# Patient Record
Sex: MALE | Race: WHITE | NOT HISPANIC OR LATINO
[De-identification: names, ages, dates, MRNs, and addresses within clinical notes are randomized per-mention and may not be internally consistent; named-entity substitution may affect disease eponyms.]

---

## 2022-10-06 PROBLEM — Z00.00 ENCOUNTER FOR PREVENTIVE HEALTH EXAMINATION: Status: ACTIVE | Noted: 2022-10-06

## 2022-10-07 ENCOUNTER — TRANSCRIPTION ENCOUNTER (OUTPATIENT)
Age: 72
End: 2022-10-07

## 2022-10-07 ENCOUNTER — INPATIENT (INPATIENT)
Facility: HOSPITAL | Age: 72
LOS: 11 days | Discharge: HOME | DRG: 216 | End: 2022-10-19
Attending: INTERNAL MEDICINE | Admitting: INTERNAL MEDICINE
Payer: MEDICARE

## 2022-10-07 ENCOUNTER — APPOINTMENT (OUTPATIENT)
Dept: HEART AND VASCULAR | Facility: CLINIC | Age: 72
End: 2022-10-07

## 2022-10-07 VITALS — HEIGHT: 70 IN | WEIGHT: 157.41 LBS

## 2022-10-07 DIAGNOSIS — Z98.890 OTHER SPECIFIED POSTPROCEDURAL STATES: Chronic | ICD-10-CM

## 2022-10-07 RX ORDER — ASPIRIN/CALCIUM CARB/MAGNESIUM 324 MG
81 TABLET ORAL DAILY
Refills: 0 | Status: DISCONTINUED | OUTPATIENT
Start: 2022-10-07 | End: 2022-10-11

## 2022-10-07 RX ORDER — SODIUM CHLORIDE 9 MG/ML
3 INJECTION INTRAMUSCULAR; INTRAVENOUS; SUBCUTANEOUS EVERY 8 HOURS
Refills: 0 | Status: DISCONTINUED | OUTPATIENT
Start: 2022-10-07 | End: 2022-10-19

## 2022-10-07 RX ORDER — INFLUENZA VIRUS VACCINE 15; 15; 15; 15 UG/.5ML; UG/.5ML; UG/.5ML; UG/.5ML
0.7 SUSPENSION INTRAMUSCULAR ONCE
Refills: 0 | Status: COMPLETED | OUTPATIENT
Start: 2022-10-07 | End: 2022-10-07

## 2022-10-07 RX ORDER — ENOXAPARIN SODIUM 100 MG/ML
70 INJECTION SUBCUTANEOUS EVERY 12 HOURS
Refills: 0 | Status: DISCONTINUED | OUTPATIENT
Start: 2022-10-07 | End: 2022-10-09

## 2022-10-07 RX ORDER — METOPROLOL TARTRATE 50 MG
25 TABLET ORAL DAILY
Refills: 0 | Status: DISCONTINUED | OUTPATIENT
Start: 2022-10-07 | End: 2022-10-08

## 2022-10-07 RX ORDER — ISOSORBIDE MONONITRATE 60 MG/1
30 TABLET, EXTENDED RELEASE ORAL DAILY
Refills: 0 | Status: DISCONTINUED | OUTPATIENT
Start: 2022-10-07 | End: 2022-10-09

## 2022-10-07 RX ORDER — ATORVASTATIN CALCIUM 80 MG/1
40 TABLET, FILM COATED ORAL AT BEDTIME
Refills: 0 | Status: DISCONTINUED | OUTPATIENT
Start: 2022-10-07 | End: 2022-10-19

## 2022-10-07 RX ADMIN — ATORVASTATIN CALCIUM 40 MILLIGRAM(S): 80 TABLET, FILM COATED ORAL at 22:01

## 2022-10-07 RX ADMIN — ENOXAPARIN SODIUM 70 MILLIGRAM(S): 100 INJECTION SUBCUTANEOUS at 22:01

## 2022-10-07 RX ADMIN — SODIUM CHLORIDE 3 MILLILITER(S): 9 INJECTION INTRAMUSCULAR; INTRAVENOUS; SUBCUTANEOUS at 21:15

## 2022-10-07 NOTE — H&P ADULT - NSHPREVIEWOFSYSTEMS_GEN_ALL_CORE
Review of Systems  CONSTITUTIONAL:  Denies Fevers / chills, sweats, fatigue, weight loss, weight gain                                      NEURO:  Denies parethesias, seizures, syncope, confusion                                                                                EYES:  Denies Blurry vision, discharge, pain, loss of vision                                                                                    ENMT:  Denies Difficulty hearing, vertigo, dysphagia, epistaxis, recent dental work                                       CV:  Denies Chest pain, palpitations, RUIZ, orthopnea                                                                                          RESPIRATORY:  Denies Wheezing, SOB, cough / sputum, hemoptysis                                                                GI:  Denies Nausea, vomiting, diarrhea, constipation, melena, difficulty swallowing                                               : Denies Hematuria, dysuria, urgency, incontinence                                                                                         MUSCULOSKELETAL:  Denies arthritis, joint swelling, muscle weakness                                                             SKIN/BREAST:  Denies rash, itching, hair loss, masses                                                                                            PSYCH:  Denies depression, anxiety, suicidal ideation                                                                                               HEME/LYMPH:  Denies bruises easily, enlarged lymph nodes, tender lymph nodes                                        ENDOCRINE:  Denies cold intolerance, heat intolerance, polydipsia

## 2022-10-07 NOTE — H&P ADULT - ASSESSMENT
Patient is a 72-year-old male nicotine dependence who has not followed with physicians regularly presented to the emergency department with complaint of substernal chest pain radiating to bilateral  arms. Patient reports he had initial episode on 10/3 upon awakening. It lasted approximately 15 minutes patient took aspirin at home and resolved. Patient had another episode of chest pain   radiating to bilateral arms this morning lasting approximately 15 minutes again took a dose of aspirin. Reoccurred yesterday afternoon and presented to the emergency department for further   evaluation. In the ER patient was found to have inverted T waves in the lateral leads and markedly elevated troponin. Patient was chest pain-free upon arrival in the ER. Emergency department spoke   with interventional cardiology who recommend loading the patient with Plavix starting on heparin drip for planned cardiac catheterization. Patient denies any associated neck or jaw pain   with his chest pain symptoms. Patient denies any cardiac evaluation in the past. He denies any family history of heart disease. Cardiac cath showed (90% mLAD, 95% pLCX, 75% mRCA).      ==== Neurovascular ====  -No delirium, pain well managed on current regimen  -C/w PRNs for Pain control  -Monitor neuro status    ==== Respiratory ====  -Saturates well on RA     -AM CXR stable, repeat in AM  -Encourage IS 10x/hour while awake, Cough and deep breathing exercises  -Monitor respiratory status via SpO2    ==== Cardiovascular ====  Monitor on Tele  Continue aspirin 81mg QD  Continue Statin  Hold Lopressor due to bradycardia  Obtain CBC, CMP, Coags, Lipid Panel, TSH, Hemoglobin A1c, Pro-BNP, Cardiac Enzymes, Type & Screen x 2,  Room air ABG, UA, Carotid US, TTE, CXR Pa/Lat, EKG, Bedside PFTS, vein mapping, radial mapping   Lovenox for NSTEMI      ==== GI ====   -Tolerating PO  -Prophylaxis: Protonix  -C/w bowel regimen    ==== /Renal ====  -BUN/Cr: pending  -Trend Cr on AM labs  -Replete electrolytes as needed  -BPH: Will place on flomax while in house     ==== ID ====   Afebrile, asymptomatic  -WBC: pending  -Continue to monitor for SIRS/Sepsis syndrome while inpatient    ==== Endocrine ====   -A1C:pending , no h/o DM  -TSH: pending, no h/o thyroid disease     ==== Hematologic ====   -H/H: Pending  -CBC, chem in AM  -DVT ppx: Lovenox and SCDs    ==== Disposition Planning ====  Telemetry, OR next week

## 2022-10-07 NOTE — H&P ADULT - NSHPPHYSICALEXAM_GEN_ALL_CORE
ICU Vital Signs Last 24 Hrs  T(C): --  T(F): --  HR: 62  BP: --  BP(mean): --  ABP: --  ABP(mean): --  RR: --  SpO2: --    GEN: NAD, looks comfortable  Psych: Mood appropriate  Neuro: A&Ox3.  No focal deficits.  Moving all extremities.   HEENT: No obvious abnormalities  CV: S1S2, regular, no murmurs appreciated.  No carotid bruits.  No JVD  Lungs: Clear B/L.  No wheezing, rales or rhonchi  ABD: Soft, non-tender, non-distended.  +Bowel sounds  EXT: Warm and well perfused.  No peripheral edema noted  Musculoskeletal: Moving all extremities with normal ROM, no joint swelling  PV: Pedal pulses palpable

## 2022-10-08 ENCOUNTER — TRANSCRIPTION ENCOUNTER (OUTPATIENT)
Age: 72
End: 2022-10-08

## 2022-10-08 LAB
A1C WITH ESTIMATED AVERAGE GLUCOSE RESULT: 5.4 % — SIGNIFICANT CHANGE UP (ref 4–5.6)
ALBUMIN SERPL ELPH-MCNC: 3.7 G/DL — SIGNIFICANT CHANGE UP (ref 3.3–5)
ALP SERPL-CCNC: 76 U/L — SIGNIFICANT CHANGE UP (ref 40–120)
ALT FLD-CCNC: 12 U/L — SIGNIFICANT CHANGE UP (ref 10–45)
ANION GAP SERPL CALC-SCNC: 11 MMOL/L — SIGNIFICANT CHANGE UP (ref 5–17)
APPEARANCE UR: CLEAR — SIGNIFICANT CHANGE UP
APTT BLD: 36 SEC — HIGH (ref 27.5–35.5)
AST SERPL-CCNC: 18 U/L — SIGNIFICANT CHANGE UP (ref 10–40)
BACTERIA # UR AUTO: PRESENT /HPF
BASOPHILS # BLD AUTO: 0.05 K/UL — SIGNIFICANT CHANGE UP (ref 0–0.2)
BASOPHILS NFR BLD AUTO: 0.7 % — SIGNIFICANT CHANGE UP (ref 0–2)
BILIRUB SERPL-MCNC: 1.1 MG/DL — SIGNIFICANT CHANGE UP (ref 0.2–1.2)
BILIRUB UR-MCNC: NEGATIVE — SIGNIFICANT CHANGE UP
BUN SERPL-MCNC: 12 MG/DL — SIGNIFICANT CHANGE UP (ref 7–23)
CALCIUM SERPL-MCNC: 9.2 MG/DL — SIGNIFICANT CHANGE UP (ref 8.4–10.5)
CHLORIDE SERPL-SCNC: 105 MMOL/L — SIGNIFICANT CHANGE UP (ref 96–108)
CHOLEST SERPL-MCNC: 181 MG/DL — SIGNIFICANT CHANGE UP
CO2 SERPL-SCNC: 23 MMOL/L — SIGNIFICANT CHANGE UP (ref 22–31)
COLOR SPEC: YELLOW — SIGNIFICANT CHANGE UP
CREAT SERPL-MCNC: 1.05 MG/DL — SIGNIFICANT CHANGE UP (ref 0.5–1.3)
DIFF PNL FLD: ABNORMAL
EGFR: 76 ML/MIN/1.73M2 — SIGNIFICANT CHANGE UP
EOSINOPHIL # BLD AUTO: 0.37 K/UL — SIGNIFICANT CHANGE UP (ref 0–0.5)
EOSINOPHIL NFR BLD AUTO: 4.9 % — SIGNIFICANT CHANGE UP (ref 0–6)
EPI CELLS # UR: SIGNIFICANT CHANGE UP /HPF (ref 0–5)
ESTIMATED AVERAGE GLUCOSE: 108 MG/DL — SIGNIFICANT CHANGE UP (ref 68–114)
GLUCOSE SERPL-MCNC: 104 MG/DL — HIGH (ref 70–99)
GLUCOSE UR QL: NEGATIVE — SIGNIFICANT CHANGE UP
HCT VFR BLD CALC: 41 % — SIGNIFICANT CHANGE UP (ref 39–50)
HCV AB S/CO SERPL IA: 0.03 S/CO — SIGNIFICANT CHANGE UP
HCV AB SERPL-IMP: SIGNIFICANT CHANGE UP
HDLC SERPL-MCNC: 41 MG/DL — SIGNIFICANT CHANGE UP
HGB BLD-MCNC: 13.8 G/DL — SIGNIFICANT CHANGE UP (ref 13–17)
IMM GRANULOCYTES NFR BLD AUTO: 0.3 % — SIGNIFICANT CHANGE UP (ref 0–0.9)
INR BLD: 1.04 — SIGNIFICANT CHANGE UP (ref 0.88–1.16)
KETONES UR-MCNC: NEGATIVE — SIGNIFICANT CHANGE UP
LEUKOCYTE ESTERASE UR-ACNC: NEGATIVE — SIGNIFICANT CHANGE UP
LIPID PNL WITH DIRECT LDL SERPL: 113 MG/DL — HIGH
LYMPHOCYTES # BLD AUTO: 2.48 K/UL — SIGNIFICANT CHANGE UP (ref 1–3.3)
LYMPHOCYTES # BLD AUTO: 32.8 % — SIGNIFICANT CHANGE UP (ref 13–44)
MAGNESIUM SERPL-MCNC: 2 MG/DL — SIGNIFICANT CHANGE UP (ref 1.6–2.6)
MCHC RBC-ENTMCNC: 31.5 PG — SIGNIFICANT CHANGE UP (ref 27–34)
MCHC RBC-ENTMCNC: 33.7 GM/DL — SIGNIFICANT CHANGE UP (ref 32–36)
MCV RBC AUTO: 93.6 FL — SIGNIFICANT CHANGE UP (ref 80–100)
MONOCYTES # BLD AUTO: 0.57 K/UL — SIGNIFICANT CHANGE UP (ref 0–0.9)
MONOCYTES NFR BLD AUTO: 7.5 % — SIGNIFICANT CHANGE UP (ref 2–14)
NEUTROPHILS # BLD AUTO: 4.07 K/UL — SIGNIFICANT CHANGE UP (ref 1.8–7.4)
NEUTROPHILS NFR BLD AUTO: 53.8 % — SIGNIFICANT CHANGE UP (ref 43–77)
NITRITE UR-MCNC: NEGATIVE — SIGNIFICANT CHANGE UP
NON HDL CHOLESTEROL: 140 MG/DL — HIGH
NRBC # BLD: 0 /100 WBCS — SIGNIFICANT CHANGE UP (ref 0–0)
NT-PROBNP SERPL-SCNC: 1223 PG/ML — HIGH (ref 0–300)
PA ADP PRP-ACNC: 184 PRU — LOW (ref 194–418)
PH UR: 6 — SIGNIFICANT CHANGE UP (ref 5–8)
PHOSPHATE SERPL-MCNC: 3.5 MG/DL — SIGNIFICANT CHANGE UP (ref 2.5–4.5)
PLATELET # BLD AUTO: 188 K/UL — SIGNIFICANT CHANGE UP (ref 150–400)
POTASSIUM SERPL-MCNC: 3.7 MMOL/L — SIGNIFICANT CHANGE UP (ref 3.5–5.3)
POTASSIUM SERPL-SCNC: 3.7 MMOL/L — SIGNIFICANT CHANGE UP (ref 3.5–5.3)
PROT SERPL-MCNC: 6.4 G/DL — SIGNIFICANT CHANGE UP (ref 6–8.3)
PROT UR-MCNC: NEGATIVE MG/DL — SIGNIFICANT CHANGE UP
PROTHROM AB SERPL-ACNC: 12.4 SEC — SIGNIFICANT CHANGE UP (ref 10.5–13.4)
RBC # BLD: 4.38 M/UL — SIGNIFICANT CHANGE UP (ref 4.2–5.8)
RBC # FLD: 13.2 % — SIGNIFICANT CHANGE UP (ref 10.3–14.5)
RBC CASTS # UR COMP ASSIST: < 5 /HPF — SIGNIFICANT CHANGE UP
SODIUM SERPL-SCNC: 139 MMOL/L — SIGNIFICANT CHANGE UP (ref 135–145)
SP GR SPEC: 1.02 — SIGNIFICANT CHANGE UP (ref 1–1.03)
TRIGL SERPL-MCNC: 134 MG/DL — SIGNIFICANT CHANGE UP
TROPONIN T SERPL-MCNC: 0.32 NG/ML — CRITICAL HIGH (ref 0–0.01)
TROPONIN T SERPL-MCNC: 0.34 NG/ML — CRITICAL HIGH (ref 0–0.01)
TSH SERPL-MCNC: 2.29 UIU/ML — SIGNIFICANT CHANGE UP (ref 0.27–4.2)
UROBILINOGEN FLD QL: 0.2 E.U./DL — SIGNIFICANT CHANGE UP
WBC # BLD: 7.56 K/UL — SIGNIFICANT CHANGE UP (ref 3.8–10.5)
WBC # FLD AUTO: 7.56 K/UL — SIGNIFICANT CHANGE UP (ref 3.8–10.5)
WBC UR QL: < 5 /HPF — SIGNIFICANT CHANGE UP

## 2022-10-08 PROCEDURE — 93010 ELECTROCARDIOGRAM REPORT: CPT

## 2022-10-08 PROCEDURE — 71046 X-RAY EXAM CHEST 2 VIEWS: CPT | Mod: 26

## 2022-10-08 PROCEDURE — 93306 TTE W/DOPPLER COMPLETE: CPT | Mod: 26

## 2022-10-08 PROCEDURE — 99232 SBSQ HOSP IP/OBS MODERATE 35: CPT

## 2022-10-08 PROCEDURE — 93880 EXTRACRANIAL BILAT STUDY: CPT | Mod: 26

## 2022-10-08 RX ORDER — POTASSIUM CHLORIDE 20 MEQ
40 PACKET (EA) ORAL ONCE
Refills: 0 | Status: COMPLETED | OUTPATIENT
Start: 2022-10-08 | End: 2022-10-08

## 2022-10-08 RX ORDER — PANTOPRAZOLE SODIUM 20 MG/1
40 TABLET, DELAYED RELEASE ORAL
Refills: 0 | Status: DISCONTINUED | OUTPATIENT
Start: 2022-10-08 | End: 2022-10-10

## 2022-10-08 RX ORDER — ACETAMINOPHEN 500 MG
650 TABLET ORAL EVERY 6 HOURS
Refills: 0 | Status: DISCONTINUED | OUTPATIENT
Start: 2022-10-08 | End: 2022-10-19

## 2022-10-08 RX ORDER — SENNA PLUS 8.6 MG/1
2 TABLET ORAL AT BEDTIME
Refills: 0 | Status: DISCONTINUED | OUTPATIENT
Start: 2022-10-08 | End: 2022-10-19

## 2022-10-08 RX ADMIN — ENOXAPARIN SODIUM 70 MILLIGRAM(S): 100 INJECTION SUBCUTANEOUS at 21:23

## 2022-10-08 RX ADMIN — SODIUM CHLORIDE 3 MILLILITER(S): 9 INJECTION INTRAMUSCULAR; INTRAVENOUS; SUBCUTANEOUS at 06:32

## 2022-10-08 RX ADMIN — SODIUM CHLORIDE 3 MILLILITER(S): 9 INJECTION INTRAMUSCULAR; INTRAVENOUS; SUBCUTANEOUS at 13:21

## 2022-10-08 RX ADMIN — ISOSORBIDE MONONITRATE 30 MILLIGRAM(S): 60 TABLET, EXTENDED RELEASE ORAL at 11:18

## 2022-10-08 RX ADMIN — Medication 81 MILLIGRAM(S): at 11:18

## 2022-10-08 RX ADMIN — PANTOPRAZOLE SODIUM 40 MILLIGRAM(S): 20 TABLET, DELAYED RELEASE ORAL at 14:15

## 2022-10-08 RX ADMIN — Medication 40 MILLIEQUIVALENT(S): at 09:29

## 2022-10-08 RX ADMIN — ATORVASTATIN CALCIUM 40 MILLIGRAM(S): 80 TABLET, FILM COATED ORAL at 21:23

## 2022-10-08 RX ADMIN — SODIUM CHLORIDE 3 MILLILITER(S): 9 INJECTION INTRAMUSCULAR; INTRAVENOUS; SUBCUTANEOUS at 22:24

## 2022-10-08 RX ADMIN — ENOXAPARIN SODIUM 70 MILLIGRAM(S): 100 INJECTION SUBCUTANEOUS at 09:13

## 2022-10-08 NOTE — PROGRESS NOTE ADULT - ASSESSMENT
71 YO Male, current smoker, with no known PMHx originally presented to [facility] c/o 2 episodes of chest pain. Patient states that on 10/3, while at rest, he began to experience substernal chest pain w/ radiation to bilateral upper extremities, lasted about 15 minutes and resolved with Aspirin. On 10/6 patient experienced another episode of similar chest pain that again resolved with Aspirin, prompting patient to go to the ED. While in [facility] ED, patient's troponin was elevated and EKG was noted to have inverted T-waves in the lateral leads. On 10/7 patient underwent cardiac cath that revealed 90% mLAD, 95% pLCX, 75% mRCA. Patient was transferred to [facility] under the care of Dr. Collins for further work-up and intervention. Patient is presently undergoing pre-operative testing. TTE significant for EF of 15-20%.     Plan:    Neurovascular:   -Pain well controlled with current regimen. PRN's: Tylenol 650mg PO Q6H PRN mild pain    Cardiovascular:   -3v CAD   -Pre-op work-up for CABG, tentative schedule for Tuesday 10/11/22.  -Cont Toprol, Lipitor and Aspirin  -Cont therapeutic LVX 70mg BID  -Cont Imdur 30mg QD  -Hemodynamically stable.   -Monitor: BP, HR, tele    Respiratory:   -Oxygenating well on room air  -Encourage continued use of IS 10x/hr and frequent ambulation  -CXR: pending     GI:  -GI PPX: Protonix  -PO Diet  -Bowel Regimen: Senna     Renal / :  -Continue to monitor renal function: BUN/Cr: 12/1.05  -Monitor I/O's daily     Endocrine:    -No hx of DM or thyroid dx  -A1c: pending  -TSH: pending     Hematologic:  -CBC: H/H- 13.8/41  -Coagulation Panel.    ID:  -Temperature: Afebrile   -CBC: WBC- 7.56  -Continue to observe for SIRS/Sepsis Syndrome.    Prophylaxis:  -Cont therapeutic LVX  -Continue with SCD's b/l while patient is at rest     Disposition:  -Pre-op planning for CABG     71 YO Male, current smoker, with no known PMHx originally presented to [facility] c/o 2 episodes of chest pain. Patient states that on 10/3, while at rest, he began to experience substernal chest pain w/ radiation to bilateral upper extremities, lasted about 15 minutes and resolved with Aspirin. On 10/6 patient experienced another episode of similar chest pain that again resolved with Aspirin, prompting patient to go to the ED. While in [facility] ED, patient's troponin was elevated and EKG was noted to have inverted T-waves in the lateral leads. Patient given Plavix load and started on heparin gtt. On 10/7 patient underwent cardiac cath that revealed 90% mLAD, 95% pLCX, 75% mRCA. Patient was transferred to [facility] under the care of Dr. Collins for further work-up and intervention. Patient is presently undergoing pre-operative testing. TTE significant for EF of 15-20%.     Plan:    Neurovascular:   -Pain well controlled with current regimen. PRN's: Tylenol 650mg PO Q6H PRN mild pain    Cardiovascular:   -3v CAD   -Pre-op work-up for CABG, tentative schedule for Tuesday 10/11/22.  -Cont Toprol, Lipitor and Aspirin  -Cont therapeutic LVX 70mg BID  -Cont Imdur 30mg QD  -Hemodynamically stable.   -Monitor: BP, HR, tele    Respiratory:   -Oxygenating well on room air  -Encourage continued use of IS 10x/hr and frequent ambulation  -CXR: pending     GI:  -GI PPX: Protonix  -PO Diet  -Bowel Regimen: Senna     Renal / :  -Continue to monitor renal function: BUN/Cr: 12/1.05  -Monitor I/O's daily     Endocrine:    -No hx of DM or thyroid dx  -A1c: pending  -TSH: pending     Hematologic:  -CBC: H/H- 13.8/41  -Coagulation Panel.    ID:  -Temperature: Afebrile   -CBC: WBC- 7.56  -Continue to observe for SIRS/Sepsis Syndrome.    Prophylaxis:  -Cont therapeutic LVX  -Continue with SCD's b/l while patient is at rest     Disposition:  -Pre-op planning for CABG

## 2022-10-08 NOTE — PROGRESS NOTE ADULT - SUBJECTIVE AND OBJECTIVE BOX
INCOMPLETE    Planned Date of Surgery: 2022                                                                                                           Surgeon/Attending MD: Dr. Collins    Procedure: CABG    Subjective: Patient seen and examined at bedside    HPI:    PAST MEDICAL & SURGICAL HISTORY:  H/O inguinal hernia repair    No Known Allergies    Vitals:  T(C): 36.6 (10-08-22 @ 09:05), Max: 37.2 (10-07-22 @ 21:08)  HR: 54 (10-08-22 @ 11:19) (52 - 60)  BP: 133/96 (10-08-22 @ 11:19) (120/59 - 167/77)  RR: 12 (10-08-22 @ 11:19) (12 - 20)  SpO2: 97% (10-08-22 @ 11:19) (96% - 98%)    Physical Exam  General:  Neuro:  CV:  Pulmonary:  GI:  Extremeties:    MEDICATIONS  (STANDING):  aspirin  chewable 81 milliGRAM(s) Oral daily  atorvastatin 40 milliGRAM(s) Oral at bedtime  enoxaparin Injectable 70 milliGRAM(s) SubCutaneous every 12 hours  influenza  Vaccine (HIGH DOSE) 0.7 milliLiter(s) IntraMuscular once  isosorbide   mononitrate ER Tablet (IMDUR) 30 milliGRAM(s) Oral daily  metoprolol succinate ER 25 milliGRAM(s) Oral daily  sodium chloride 0.9% lock flush 3 milliLiter(s) IV Push every 8 hours    MEDICATIONS  (PRN):    On Beta Blocker? YES   On Aspirin 81mg? YES    Labs:                        13.8   7.56  )-----------( 188      ( 08 Oct 2022 05:30 )             41.0     10-08    139  |  105  |  12  ----------------------------<  104<H>  3.7   |  23  |  1.05    Ca    9.2      08 Oct 2022 05:30  Phos  3.5     10-  Mg     2.0     10-08    TPro  6.4  /  Alb  3.7  /  TBili  1.1  /  DBili  x   /  AST  18  /  ALT  12  /  AlkPhos  76  10-08    PT/INR - ( 08 Oct 2022 05:30 )   PT: 12.4 sec;   INR: 1.04       PTT - ( 08 Oct 2022 05:30 )  PTT:36.0 sec  Urinalysis Basic - ( 08 Oct 2022 04:34 )    Color: Yellow / Appearance: Clear / S.020 / pH: x  Gluc: x / Ketone: NEGATIVE  / Bili: Negative / Urobili: 0.2 E.U./dL   Blood: x / Protein: NEGATIVE mg/dL / Nitrite: NEGATIVE   Leuk Esterase: NEGATIVE / RBC: < 5 /HPF / WBC < 5 /HPF   Sq Epi: x / Non Sq Epi: 0-5 /HPF / Bacteria: Present /HPF    ABO Interpretation: O (10-08-22 @ 07:07)    CARDIAC MARKERS ( 08 Oct 2022 05:30 )  x     / 0.34 ng/mL / x     / x     / x        Preoperative Checklist: (x = completed, n/a = not applicable, p = pending)  X___ECHO  P___PA/Lateral CXR  X___Carotid Duplex  N/A___CT imaging  P___PFTs  X___UA  X___Baseline Labs (CMP, CBC w/ diff, PTT, PT/INR)  P__A1c  P__TSH  P____Lipid panel  X__Cardiac enzymes  X____Pro BNP  P___EKG   X___T&S 1  P___T&S 2 (within 72 hours)  ______COVID PCR (within 72 hours)  N/A___Room air ABG  X____P2Y12  N/A___bHCG  ______Consents  ______Pre-op Orders Placed  ______Blood Products Ordered  ______NPO at midnight  ______Conduit tested    Abnormal/Noteworthy Preoperative Testing Results:   < from: TTE Echo Complete w/ Contrast w/ Doppler (10.08.22 @ 08:07) >  -----  CONCLUSIONS:     1. Multiple segmental abnormalities exist. See findings.   2. Dilated left ventriclular size.   3. Severely reduced left ventricular systolic function.   4. Regional wall motion abnormalities consistent with ischemic heart   disease.   5. Grade I left ventricular diastolic dysfunction.   6. Normal right ventricular size and systolic function.   7. Dilated left atrium.   8. Mild-to-moderate mitral regurgitation.   9. Pulmonary artery systolic pressure is 27 mmHg.  10. No pericardial effusion.  11. No prior echo is available for comparison.       Planned Date of Surgery: 2022                                                                                                           Surgeon/Attending MD: Dr. Collins    Procedure: CABG    Subjective: Patient seen and examined at bedside. Patient states that he feels well today. Denies chills, chest pain, SOB, palpitations, N/V.     HPI: 71 YO Male, current smoker, with no known PMHx originally presented to [facility] c/o 2 episodes of chest pain. Patient states that on 10/3, while at rest, he began to experience substernal chest pain w/ radiation to bilateral upper extremities, lasted about 15 minutes and resolved with Aspirin. On 10/6 patient experienced another episode of similar chest pain that again resolved with Aspirin, prompting patient to go to the ED. While in [facility] ED, patient's troponin was elevated and EKG was noted to have inverted T-waves in the lateral leads. On 10/7 patient underwent cardiac cath that revealed 90% mLAD, 95% pLCX, 75% mRCA. Patient was transferred to [facility] under the care of Dr. Collins for further work-up and intervention. Patient is presently undergoing pre-operative testing. TTE significant for EF of 15-20%.     PAST MEDICAL & SURGICAL HISTORY:  H/O bilateral inguinal hernia repair    No Known Allergies    Vitals:  T(C): 36.6 (10-08-22 @ 09:05), Max: 37.2 (10-07-22 @ 21:08)  HR: 54 (10-08-22 @ 11:19) (52 - 60)  BP: 133/96 (10-08-22 @ 11:19) (120/59 - 167/77)  RR: 12 (10-08-22 @ 11:19) (12 - 20)  SpO2: 97% (10-08-22 @ 11:19) (96% - 98%)    Physical Exam  GENERAL: NAD, sitting upright in chair.  HEAD:  Atraumatic, Normocephalic  EYES: EOMI, PERRLA, conjunctiva and sclera clear  ENT: Moist mucous membranes  NECK: Supple, No JVD  CHEST/LUNG: CTAB; No rales, rhonchi, wheezing, or rubs. Unlabored respirations  HEART: Regular rate and rhythm; No murmurs, rubs, or gallops  ABDOMEN: Bowel sounds present; Soft, Nontender, Nondistended. No hepatomegally  EXTREMITIES:  2+ Peripheral Pulses, brisk capillary refill. No clubbing, cyanosis, or edema  GROIN: R groin dressing C/D/I. Bilateral well-healed inguinal hernia incisions noted.   NERVOUS SYSTEM:  Alert & Oriented X3, speech clear. No deficits   MSK: FROM all 4 extremities, full and equal strength  SKIN: No rashes or lesions    MEDICATIONS  (STANDING):  aspirin  chewable 81 milliGRAM(s) Oral daily  atorvastatin 40 milliGRAM(s) Oral at bedtime  enoxaparin Injectable 70 milliGRAM(s) SubCutaneous every 12 hours  influenza  Vaccine (HIGH DOSE) 0.7 milliLiter(s) IntraMuscular once  isosorbide   mononitrate ER Tablet (IMDUR) 30 milliGRAM(s) Oral daily  metoprolol succinate ER 25 milliGRAM(s) Oral daily  sodium chloride 0.9% lock flush 3 milliLiter(s) IV Push every 8 hours    MEDICATIONS  (PRN):    On Beta Blocker? YES   On Aspirin 81mg? YES    Labs:                        13.8   7.56  )-----------( 188      ( 08 Oct 2022 05:30 )             41.0     10-08    139  |  105  |  12  ----------------------------<  104<H>  3.7   |  23  |  1.05    Ca    9.2      08 Oct 2022 05:30  Phos  3.5     10-08  Mg     2.0     10-08    TPro  6.4  /  Alb  3.7  /  TBili  1.1  /  DBili  x   /  AST  18  /  ALT  12  /  AlkPhos  76  10-08    PT/INR - ( 08 Oct 2022 05:30 )   PT: 12.4 sec;   INR: 1.04       PTT - ( 08 Oct 2022 05:30 )  PTT:36.0 sec  Urinalysis Basic - ( 08 Oct 2022 04:34 )    Color: Yellow / Appearance: Clear / S.020 / pH: x  Gluc: x / Ketone: NEGATIVE  / Bili: Negative / Urobili: 0.2 E.U./dL   Blood: x / Protein: NEGATIVE mg/dL / Nitrite: NEGATIVE   Leuk Esterase: NEGATIVE / RBC: < 5 /HPF / WBC < 5 /HPF   Sq Epi: x / Non Sq Epi: 0-5 /HPF / Bacteria: Present /HPF    ABO Interpretation: O (10-08-22 @ 07:07)    CARDIAC MARKERS ( 08 Oct 2022 05:30 )  x     / 0.34 ng/mL / x     / x     / x        Preoperative Checklist: (x = completed, n/a = not applicable, p = pending)  X___ECHO  P___PA/Lateral CXR  X___Carotid Duplex  N/A___CT imaging  P___PFTs  X___UA  X___Baseline Labs (CMP, CBC w/ diff, PTT, PT/INR)  P__A1c  P__TSH  P____Lipid panel  X__Cardiac enzymes  X____Pro BNP  X___EKG   X___T&S 1  P___T&S 2 (within 72 hours)  ______COVID PCR (within 72 hours)  N/A___Room air ABG  X____P2Y12  N/A___bHCG  ______Consents  ______Pre-op Orders Placed  ______Blood Products Ordered  ______NPO at midnight  ______Conduit tested    Abnormal/Noteworthy Preoperative Testing Results:   < from: TTE Echo Complete w/ Contrast w/ Doppler (10.08.22 @ 08:07) >  -----  CONCLUSIONS:     1. Multiple segmental abnormalities exist. See findings.   2. Dilated left ventriclular size.   3. Severely reduced left ventricular systolic function.   4. Regional wall motion abnormalities consistent with ischemic heart   disease.   5. Grade I left ventricular diastolic dysfunction.   6. Normal right ventricular size and systolic function.   7. Dilated left atrium.   8. Mild-to-moderate mitral regurgitation.   9. Pulmonary artery systolic pressure is 27 mmHg.  10. No pericardial effusion.  11. No prior echo is available for comparison.         Planned Date of Surgery: 2022                                                                                                           Surgeon/Attending MD: Dr. Collins    Procedure: CABG    Subjective: Patient seen and examined at bedside. Patient states that he feels well today. Denies chills, chest pain, SOB, palpitations, N/V.     HPI: 71 YO Male, current smoker, with no known PMHx originally presented to [facility] c/o 2 episodes of chest pain. Patient states that on 10/3, while at rest, he began to experience substernal chest pain w/ radiation to bilateral upper extremities, lasted about 15 minutes and resolved with Aspirin. On 10/6 patient experienced another episode of similar chest pain that again resolved with Aspirin, prompting patient to go to the ED. While in [facility] ED, patient's troponin was elevated and EKG was noted to have inverted T-waves in the lateral leads. Patient given Plavix load and started on heparin gtt. On 10/7 patient underwent cardiac cath that revealed 90% mLAD, 95% pLCX, 75% mRCA. Patient was transferred to [facility] under the care of Dr. Collins for further work-up and intervention. Patient is presently undergoing pre-operative testing. TTE significant for EF of 15-20%.     PAST MEDICAL & SURGICAL HISTORY:  H/O bilateral inguinal hernia repair    No Known Allergies    Vitals:  T(C): 36.6 (10-08-22 @ 09:05), Max: 37.2 (10-07-22 @ 21:08)  HR: 54 (10-08-22 @ 11:19) (52 - 60)  BP: 133/96 (10-08-22 @ 11:19) (120/59 - 167/77)  RR: 12 (10-08-22 @ 11:19) (12 - 20)  SpO2: 97% (10-08-22 @ 11:19) (96% - 98%)    Physical Exam  GENERAL: NAD, sitting upright in chair.  HEAD:  Atraumatic, Normocephalic  EYES: EOMI, PERRLA, conjunctiva and sclera clear  ENT: Moist mucous membranes  NECK: Supple, No JVD  CHEST/LUNG: CTAB; No rales, rhonchi, wheezing, or rubs. Unlabored respirations  HEART: Regular rate and rhythm; No murmurs, rubs, or gallops  ABDOMEN: Bowel sounds present; Soft, Nontender, Nondistended. No hepatomegally  EXTREMITIES:  2+ Peripheral Pulses, brisk capillary refill. No clubbing, cyanosis, or edema  GROIN: R groin dressing C/D/I. Bilateral well-healed inguinal hernia incisions noted.   NERVOUS SYSTEM:  Alert & Oriented X3, speech clear. No deficits   MSK: FROM all 4 extremities, full and equal strength  SKIN: No rashes or lesions    MEDICATIONS  (STANDING):  aspirin  chewable 81 milliGRAM(s) Oral daily  atorvastatin 40 milliGRAM(s) Oral at bedtime  enoxaparin Injectable 70 milliGRAM(s) SubCutaneous every 12 hours  influenza  Vaccine (HIGH DOSE) 0.7 milliLiter(s) IntraMuscular once  isosorbide   mononitrate ER Tablet (IMDUR) 30 milliGRAM(s) Oral daily  metoprolol succinate ER 25 milliGRAM(s) Oral daily  sodium chloride 0.9% lock flush 3 milliLiter(s) IV Push every 8 hours    MEDICATIONS  (PRN):    On Beta Blocker? YES   On Aspirin 81mg? YES    Labs:                        13.8   7.56  )-----------( 188      ( 08 Oct 2022 05:30 )             41.0     10-08    139  |  105  |  12  ----------------------------<  104<H>  3.7   |  23  |  1.05    Ca    9.2      08 Oct 2022 05:30  Phos  3.5     10-08  Mg     2.0     10-08    TPro  6.4  /  Alb  3.7  /  TBili  1.1  /  DBili  x   /  AST  18  /  ALT  12  /  AlkPhos  76  10-08    PT/INR - ( 08 Oct 2022 05:30 )   PT: 12.4 sec;   INR: 1.04       PTT - ( 08 Oct 2022 05:30 )  PTT:36.0 sec  Urinalysis Basic - ( 08 Oct 2022 04:34 )    Color: Yellow / Appearance: Clear / S.020 / pH: x  Gluc: x / Ketone: NEGATIVE  / Bili: Negative / Urobili: 0.2 E.U./dL   Blood: x / Protein: NEGATIVE mg/dL / Nitrite: NEGATIVE   Leuk Esterase: NEGATIVE / RBC: < 5 /HPF / WBC < 5 /HPF   Sq Epi: x / Non Sq Epi: 0-5 /HPF / Bacteria: Present /HPF    ABO Interpretation: O (10-08-22 @ 07:07)    CARDIAC MARKERS ( 08 Oct 2022 05:30 )  x     / 0.34 ng/mL / x     / x     / x        Preoperative Checklist: (x = completed, n/a = not applicable, p = pending)  X___ECHO  P___PA/Lateral CXR  X___Carotid Duplex  N/A___CT imaging  P___PFTs  X___UA  X___Baseline Labs (CMP, CBC w/ diff, PTT, PT/INR)  P__A1c  P__TSH  P____Lipid panel  X__Cardiac enzymes  X____Pro BNP  X___EKG   X___T&S 1  P___T&S 2 (within 72 hours)  ______COVID PCR (within 72 hours)  N/A___Room air ABG  X____P2Y12  N/A___bHCG  ______Consents  ______Pre-op Orders Placed  ______Blood Products Ordered  ______NPO at midnight  ______Conduit tested    Abnormal/Noteworthy Preoperative Testing Results:   < from: TTE Echo Complete w/ Contrast w/ Doppler (10.08.22 @ 08:07) >  -----  CONCLUSIONS:     1. Multiple segmental abnormalities exist. See findings.   2. Dilated left ventriclular size.   3. Severely reduced left ventricular systolic function.   4. Regional wall motion abnormalities consistent with ischemic heart   disease.   5. Grade I left ventricular diastolic dysfunction.   6. Normal right ventricular size and systolic function.   7. Dilated left atrium.   8. Mild-to-moderate mitral regurgitation.   9. Pulmonary artery systolic pressure is 27 mmHg.  10. No pericardial effusion.  11. No prior echo is available for comparison.         Planned Date of Surgery: 2022                                                                                                           Surgeon/Attending MD: Dr. Collins    Procedure: CABG    Subjective: Patient seen and examined at bedside. Patient states that he feels well today. Denies chills, chest pain, SOB, palpitations, N/V.     HPI: 73 YO Male, current smoker, with no known PMHx originally presented to [place] c/o 2 episodes of chest pain. Patient states that on 10/3, while at rest, he began to experience substernal chest pain w/ radiation to bilateral upper extremities, lasted about 15 minutes and resolved with Aspirin. On 10/6 patient experienced another episode of similar chest pain that again resolved with Aspirin, prompting patient to go to the ED. While in [place] ED, patient's troponin was elevated and EKG was noted to have inverted T-waves in the lateral leads. Patient given Plavix load and started on heparin gtt. On 10/7 patient underwent cardiac cath that revealed 90% mLAD, 95% pLCX, 75% mRCA. Patient was transferred to [facility] under the care of Dr. Collins for further work-up and intervention. Patient is presently undergoing pre-operative testing. TTE significant for EF of 15-20%.     PAST MEDICAL & SURGICAL HISTORY:  H/O bilateral inguinal hernia repair    No Known Allergies    Vitals:  T(C): 36.6 (10-08-22 @ 09:05), Max: 37.2 (10-07-22 @ 21:08)  HR: 54 (10-08-22 @ 11:19) (52 - 60)  BP: 133/96 (10-08-22 @ 11:19) (120/59 - 167/77)  RR: 12 (10-08-22 @ 11:19) (12 - 20)  SpO2: 97% (10-08-22 @ 11:19) (96% - 98%)    Physical Exam  GENERAL: NAD, sitting upright in chair.  HEAD:  Atraumatic, Normocephalic  EYES: EOMI, PERRLA, conjunctiva and sclera clear  ENT: Moist mucous membranes  NECK: Supple, No JVD  CHEST/LUNG: CTAB; No rales, rhonchi, wheezing, or rubs. Unlabored respirations  HEART: Regular rate and rhythm; No murmurs, rubs, or gallops  ABDOMEN: Bowel sounds present; Soft, Nontender, Nondistended. No hepatomegally  EXTREMITIES:  2+ Peripheral Pulses, brisk capillary refill. No clubbing, cyanosis, or edema  GROIN: R groin dressing C/D/I. Bilateral well-healed inguinal hernia incisions noted.   NERVOUS SYSTEM:  Alert & Oriented X3, speech clear. No deficits   MSK: FROM all 4 extremities, full and equal strength  SKIN: No rashes or lesions    MEDICATIONS  (STANDING):  aspirin  chewable 81 milliGRAM(s) Oral daily  atorvastatin 40 milliGRAM(s) Oral at bedtime  enoxaparin Injectable 70 milliGRAM(s) SubCutaneous every 12 hours  influenza  Vaccine (HIGH DOSE) 0.7 milliLiter(s) IntraMuscular once  isosorbide   mononitrate ER Tablet (IMDUR) 30 milliGRAM(s) Oral daily  metoprolol succinate ER 25 milliGRAM(s) Oral daily  sodium chloride 0.9% lock flush 3 milliLiter(s) IV Push every 8 hours    MEDICATIONS  (PRN):    On Beta Blocker? YES   On Aspirin 81mg? YES    Labs:                        13.8   7.56  )-----------( 188      ( 08 Oct 2022 05:30 )             41.0     10-08    139  |  105  |  12  ----------------------------<  104<H>  3.7   |  23  |  1.05    Ca    9.2      08 Oct 2022 05:30  Phos  3.5     10-08  Mg     2.0     10-08    TPro  6.4  /  Alb  3.7  /  TBili  1.1  /  DBili  x   /  AST  18  /  ALT  12  /  AlkPhos  76  10-08    PT/INR - ( 08 Oct 2022 05:30 )   PT: 12.4 sec;   INR: 1.04       PTT - ( 08 Oct 2022 05:30 )  PTT:36.0 sec  Urinalysis Basic - ( 08 Oct 2022 04:34 )    Color: Yellow / Appearance: Clear / S.020 / pH: x  Gluc: x / Ketone: NEGATIVE  / Bili: Negative / Urobili: 0.2 E.U./dL   Blood: x / Protein: NEGATIVE mg/dL / Nitrite: NEGATIVE   Leuk Esterase: NEGATIVE / RBC: < 5 /HPF / WBC < 5 /HPF   Sq Epi: x / Non Sq Epi: 0-5 /HPF / Bacteria: Present /HPF    ABO Interpretation: O (10-08-22 @ 07:07)    CARDIAC MARKERS ( 08 Oct 2022 05:30 )  x     / 0.34 ng/mL / x     / x     / x        Preoperative Checklist: (x = completed, n/a = not applicable, p = pending)  X___ECHO  X___PA/Lateral CXR  X___Carotid Duplex  N/A___CT imaging  X___PFTs  X___UA  X___Baseline Labs (CMP, CBC w/ diff, PTT, PT/INR)  P__A1c  P__TSH  P____Lipid panel  X__Cardiac enzymes  X____Pro BNP  X___EKG   X___T&S 1  P___T&S 2 (within 72 hours)  ______COVID PCR (within 72 hours)  N/A___Room air ABG  X____P2Y12  N/A___bHCG  ______Consents  ______Pre-op Orders Placed  ______Blood Products Ordered  ______NPO at midnight  ______Conduit tested    Abnormal/Noteworthy Preoperative Testing Results:   < from: TTE Echo Complete w/ Contrast w/ Doppler (10.08.22 @ 08:07) >  -----  CONCLUSIONS:     1. Multiple segmental abnormalities exist. See findings.   2. Dilated left ventriclular size.   3. Severely reduced left ventricular systolic function.   4. Regional wall motion abnormalities consistent with ischemic heart   disease.   5. Grade I left ventricular diastolic dysfunction.   6. Normal right ventricular size and systolic function.   7. Dilated left atrium.   8. Mild-to-moderate mitral regurgitation.   9. Pulmonary artery systolic pressure is 27 mmHg.  10. No pericardial effusion.  11. No prior echo is available for comparison.

## 2022-10-09 ENCOUNTER — TRANSCRIPTION ENCOUNTER (OUTPATIENT)
Age: 72
End: 2022-10-09

## 2022-10-09 LAB
ALBUMIN SERPL ELPH-MCNC: 3.8 G/DL — SIGNIFICANT CHANGE UP (ref 3.3–5)
ALP SERPL-CCNC: 82 U/L — SIGNIFICANT CHANGE UP (ref 40–120)
ALT FLD-CCNC: 14 U/L — SIGNIFICANT CHANGE UP (ref 10–45)
ANION GAP SERPL CALC-SCNC: 11 MMOL/L — SIGNIFICANT CHANGE UP (ref 5–17)
ANION GAP SERPL CALC-SCNC: 12 MMOL/L — SIGNIFICANT CHANGE UP (ref 5–17)
ANION GAP SERPL CALC-SCNC: 12 MMOL/L — SIGNIFICANT CHANGE UP (ref 5–17)
APTT BLD: 23.7 SEC — LOW (ref 27.5–35.5)
APTT BLD: 30.8 SEC — SIGNIFICANT CHANGE UP (ref 27.5–35.5)
APTT BLD: 32.6 SEC — SIGNIFICANT CHANGE UP (ref 27.5–35.5)
AST SERPL-CCNC: 19 U/L — SIGNIFICANT CHANGE UP (ref 10–40)
BASE EXCESS BLDA CALC-SCNC: 2.4 MMOL/L — SIGNIFICANT CHANGE UP (ref -2–3)
BILIRUB SERPL-MCNC: 1 MG/DL — SIGNIFICANT CHANGE UP (ref 0.2–1.2)
BLD GP AB SCN SERPL QL: NEGATIVE — SIGNIFICANT CHANGE UP
BUN SERPL-MCNC: 11 MG/DL — SIGNIFICANT CHANGE UP (ref 7–23)
BUN SERPL-MCNC: 8 MG/DL — SIGNIFICANT CHANGE UP (ref 7–23)
BUN SERPL-MCNC: 9 MG/DL — SIGNIFICANT CHANGE UP (ref 7–23)
CALCIUM SERPL-MCNC: 8.7 MG/DL — SIGNIFICANT CHANGE UP (ref 8.4–10.5)
CALCIUM SERPL-MCNC: 9 MG/DL — SIGNIFICANT CHANGE UP (ref 8.4–10.5)
CALCIUM SERPL-MCNC: 9.1 MG/DL — SIGNIFICANT CHANGE UP (ref 8.4–10.5)
CHLORIDE SERPL-SCNC: 103 MMOL/L — SIGNIFICANT CHANGE UP (ref 96–108)
CHLORIDE SERPL-SCNC: 105 MMOL/L — SIGNIFICANT CHANGE UP (ref 96–108)
CHLORIDE SERPL-SCNC: 107 MMOL/L — SIGNIFICANT CHANGE UP (ref 96–108)
CO2 BLDA-SCNC: 26 MMOL/L — HIGH (ref 19–24)
CO2 SERPL-SCNC: 20 MMOL/L — LOW (ref 22–31)
CO2 SERPL-SCNC: 21 MMOL/L — LOW (ref 22–31)
CO2 SERPL-SCNC: 22 MMOL/L — SIGNIFICANT CHANGE UP (ref 22–31)
CREAT SERPL-MCNC: 0.96 MG/DL — SIGNIFICANT CHANGE UP (ref 0.5–1.3)
CREAT SERPL-MCNC: 0.99 MG/DL — SIGNIFICANT CHANGE UP (ref 0.5–1.3)
CREAT SERPL-MCNC: 1.09 MG/DL — SIGNIFICANT CHANGE UP (ref 0.5–1.3)
EGFR: 73 ML/MIN/1.73M2 — SIGNIFICANT CHANGE UP
EGFR: 81 ML/MIN/1.73M2 — SIGNIFICANT CHANGE UP
EGFR: 84 ML/MIN/1.73M2 — SIGNIFICANT CHANGE UP
GAS PNL BLDA: SIGNIFICANT CHANGE UP
GLUCOSE BLDC GLUCOMTR-MCNC: 106 MG/DL — HIGH (ref 70–99)
GLUCOSE SERPL-MCNC: 101 MG/DL — HIGH (ref 70–99)
GLUCOSE SERPL-MCNC: 113 MG/DL — HIGH (ref 70–99)
GLUCOSE SERPL-MCNC: 87 MG/DL — SIGNIFICANT CHANGE UP (ref 70–99)
HCO3 BLDA-SCNC: 25 MMOL/L — SIGNIFICANT CHANGE UP (ref 21–28)
HCT VFR BLD CALC: 39.9 % — SIGNIFICANT CHANGE UP (ref 39–50)
HCT VFR BLD CALC: 41 % — SIGNIFICANT CHANGE UP (ref 39–50)
HCT VFR BLD CALC: 42.3 % — SIGNIFICANT CHANGE UP (ref 39–50)
HGB BLD-MCNC: 13.4 G/DL — SIGNIFICANT CHANGE UP (ref 13–17)
HGB BLD-MCNC: 13.9 G/DL — SIGNIFICANT CHANGE UP (ref 13–17)
HGB BLD-MCNC: 14.3 G/DL — SIGNIFICANT CHANGE UP (ref 13–17)
INR BLD: 1.06 — SIGNIFICANT CHANGE UP (ref 0.88–1.16)
INR BLD: 1.08 — SIGNIFICANT CHANGE UP (ref 0.88–1.16)
MAGNESIUM SERPL-MCNC: 1.9 MG/DL — SIGNIFICANT CHANGE UP (ref 1.6–2.6)
MAGNESIUM SERPL-MCNC: 2 MG/DL — SIGNIFICANT CHANGE UP (ref 1.6–2.6)
MAGNESIUM SERPL-MCNC: 2 MG/DL — SIGNIFICANT CHANGE UP (ref 1.6–2.6)
MCHC RBC-ENTMCNC: 31.2 PG — SIGNIFICANT CHANGE UP (ref 27–34)
MCHC RBC-ENTMCNC: 31.8 PG — SIGNIFICANT CHANGE UP (ref 27–34)
MCHC RBC-ENTMCNC: 31.9 PG — SIGNIFICANT CHANGE UP (ref 27–34)
MCHC RBC-ENTMCNC: 33.6 GM/DL — SIGNIFICANT CHANGE UP (ref 32–36)
MCHC RBC-ENTMCNC: 33.8 GM/DL — SIGNIFICANT CHANGE UP (ref 32–36)
MCHC RBC-ENTMCNC: 33.9 GM/DL — SIGNIFICANT CHANGE UP (ref 32–36)
MCV RBC AUTO: 92.4 FL — SIGNIFICANT CHANGE UP (ref 80–100)
MCV RBC AUTO: 94 FL — SIGNIFICANT CHANGE UP (ref 80–100)
MCV RBC AUTO: 94.5 FL — SIGNIFICANT CHANGE UP (ref 80–100)
NRBC # BLD: 0 /100 WBCS — SIGNIFICANT CHANGE UP (ref 0–0)
PA ADP PRP-ACNC: 215 PRU — SIGNIFICANT CHANGE UP (ref 194–418)
PCO2 BLDA: 32 MMHG — LOW (ref 35–48)
PH BLDA: 7.5 — HIGH (ref 7.35–7.45)
PHOSPHATE SERPL-MCNC: 3.1 MG/DL — SIGNIFICANT CHANGE UP (ref 2.5–4.5)
PLATELET # BLD AUTO: 195 K/UL — SIGNIFICANT CHANGE UP (ref 150–400)
PLATELET # BLD AUTO: 224 K/UL — SIGNIFICANT CHANGE UP (ref 150–400)
PLATELET # BLD AUTO: 231 K/UL — SIGNIFICANT CHANGE UP (ref 150–400)
PO2 BLDA: 85 MMHG — SIGNIFICANT CHANGE UP (ref 83–108)
POTASSIUM SERPL-MCNC: 3.9 MMOL/L — SIGNIFICANT CHANGE UP (ref 3.5–5.3)
POTASSIUM SERPL-MCNC: 4 MMOL/L — SIGNIFICANT CHANGE UP (ref 3.5–5.3)
POTASSIUM SERPL-MCNC: 4.1 MMOL/L — SIGNIFICANT CHANGE UP (ref 3.5–5.3)
POTASSIUM SERPL-SCNC: 3.9 MMOL/L — SIGNIFICANT CHANGE UP (ref 3.5–5.3)
POTASSIUM SERPL-SCNC: 4 MMOL/L — SIGNIFICANT CHANGE UP (ref 3.5–5.3)
POTASSIUM SERPL-SCNC: 4.1 MMOL/L — SIGNIFICANT CHANGE UP (ref 3.5–5.3)
PROT SERPL-MCNC: 6.9 G/DL — SIGNIFICANT CHANGE UP (ref 6–8.3)
PROTHROM AB SERPL-ACNC: 12.6 SEC — SIGNIFICANT CHANGE UP (ref 10.5–13.4)
PROTHROM AB SERPL-ACNC: 12.9 SEC — SIGNIFICANT CHANGE UP (ref 10.5–13.4)
RBC # BLD: 4.22 M/UL — SIGNIFICANT CHANGE UP (ref 4.2–5.8)
RBC # BLD: 4.36 M/UL — SIGNIFICANT CHANGE UP (ref 4.2–5.8)
RBC # BLD: 4.58 M/UL — SIGNIFICANT CHANGE UP (ref 4.2–5.8)
RBC # FLD: 12.7 % — SIGNIFICANT CHANGE UP (ref 10.3–14.5)
RBC # FLD: 12.8 % — SIGNIFICANT CHANGE UP (ref 10.3–14.5)
RBC # FLD: 13.1 % — SIGNIFICANT CHANGE UP (ref 10.3–14.5)
RH IG SCN BLD-IMP: POSITIVE — SIGNIFICANT CHANGE UP
SAO2 % BLDA: 98.6 % — HIGH (ref 94–98)
SARS-COV-2 RNA SPEC QL NAA+PROBE: SIGNIFICANT CHANGE UP
SODIUM SERPL-SCNC: 135 MMOL/L — SIGNIFICANT CHANGE UP (ref 135–145)
SODIUM SERPL-SCNC: 137 MMOL/L — SIGNIFICANT CHANGE UP (ref 135–145)
SODIUM SERPL-SCNC: 141 MMOL/L — SIGNIFICANT CHANGE UP (ref 135–145)
WBC # BLD: 10.72 K/UL — HIGH (ref 3.8–10.5)
WBC # BLD: 6.92 K/UL — SIGNIFICANT CHANGE UP (ref 3.8–10.5)
WBC # BLD: 9.19 K/UL — SIGNIFICANT CHANGE UP (ref 3.8–10.5)
WBC # FLD AUTO: 10.72 K/UL — HIGH (ref 3.8–10.5)
WBC # FLD AUTO: 6.92 K/UL — SIGNIFICANT CHANGE UP (ref 3.8–10.5)
WBC # FLD AUTO: 9.19 K/UL — SIGNIFICANT CHANGE UP (ref 3.8–10.5)

## 2022-10-09 PROCEDURE — 70496 CT ANGIOGRAPHY HEAD: CPT | Mod: 26

## 2022-10-09 PROCEDURE — 0042T: CPT

## 2022-10-09 PROCEDURE — 70498 CT ANGIOGRAPHY NECK: CPT | Mod: 26

## 2022-10-09 PROCEDURE — 93010 ELECTROCARDIOGRAM REPORT: CPT

## 2022-10-09 PROCEDURE — 36620 INSERTION CATHETER ARTERY: CPT

## 2022-10-09 PROCEDURE — 99291 CRITICAL CARE FIRST HOUR: CPT

## 2022-10-09 PROCEDURE — 99222 1ST HOSP IP/OBS MODERATE 55: CPT | Mod: 25

## 2022-10-09 PROCEDURE — 70450 CT HEAD/BRAIN W/O DYE: CPT | Mod: 26

## 2022-10-09 RX ORDER — MAGNESIUM OXIDE 400 MG ORAL TABLET 241.3 MG
400 TABLET ORAL ONCE
Refills: 0 | Status: DISCONTINUED | OUTPATIENT
Start: 2022-10-09 | End: 2022-10-09

## 2022-10-09 RX ORDER — POTASSIUM CHLORIDE 20 MEQ
20 PACKET (EA) ORAL ONCE
Refills: 0 | Status: COMPLETED | OUTPATIENT
Start: 2022-10-09 | End: 2022-10-09

## 2022-10-09 RX ORDER — POTASSIUM CHLORIDE 20 MEQ
20 PACKET (EA) ORAL ONCE
Refills: 0 | Status: DISCONTINUED | OUTPATIENT
Start: 2022-10-09 | End: 2022-10-09

## 2022-10-09 RX ORDER — MAGNESIUM OXIDE 400 MG ORAL TABLET 241.3 MG
400 TABLET ORAL ONCE
Refills: 0 | Status: COMPLETED | OUTPATIENT
Start: 2022-10-09 | End: 2022-10-09

## 2022-10-09 RX ORDER — HEPARIN SODIUM 5000 [USP'U]/ML
850 INJECTION INTRAVENOUS; SUBCUTANEOUS
Qty: 25000 | Refills: 0 | Status: DISCONTINUED | OUTPATIENT
Start: 2022-10-09 | End: 2022-10-10

## 2022-10-09 RX ORDER — PHENYLEPHRINE HYDROCHLORIDE 10 MG/ML
0.1 INJECTION INTRAVENOUS
Qty: 40 | Refills: 0 | Status: DISCONTINUED | OUTPATIENT
Start: 2022-10-09 | End: 2022-10-14

## 2022-10-09 RX ADMIN — Medication 20 MILLIEQUIVALENT(S): at 07:06

## 2022-10-09 RX ADMIN — SODIUM CHLORIDE 3 MILLILITER(S): 9 INJECTION INTRAMUSCULAR; INTRAVENOUS; SUBCUTANEOUS at 06:52

## 2022-10-09 RX ADMIN — ISOSORBIDE MONONITRATE 30 MILLIGRAM(S): 60 TABLET, EXTENDED RELEASE ORAL at 13:45

## 2022-10-09 RX ADMIN — SODIUM CHLORIDE 3 MILLILITER(S): 9 INJECTION INTRAMUSCULAR; INTRAVENOUS; SUBCUTANEOUS at 13:42

## 2022-10-09 RX ADMIN — SODIUM CHLORIDE 3 MILLILITER(S): 9 INJECTION INTRAMUSCULAR; INTRAVENOUS; SUBCUTANEOUS at 22:32

## 2022-10-09 RX ADMIN — PANTOPRAZOLE SODIUM 40 MILLIGRAM(S): 20 TABLET, DELAYED RELEASE ORAL at 06:12

## 2022-10-09 RX ADMIN — ENOXAPARIN SODIUM 70 MILLIGRAM(S): 100 INJECTION SUBCUTANEOUS at 13:45

## 2022-10-09 RX ADMIN — MAGNESIUM OXIDE 400 MG ORAL TABLET 400 MILLIGRAM(S): 241.3 TABLET ORAL at 07:05

## 2022-10-09 RX ADMIN — Medication 81 MILLIGRAM(S): at 13:45

## 2022-10-09 RX ADMIN — PHENYLEPHRINE HYDROCHLORIDE 2.68 MICROGRAM(S)/KG/MIN: 10 INJECTION INTRAVENOUS at 19:19

## 2022-10-09 NOTE — CONSULT NOTE ADULT - SUBJECTIVE AND OBJECTIVE BOX
CONSULT / POST OP NOTE:  s/p cerebral angiogram via R CFA for L M1 mechanical thrombectomy, TICI 0 to 3    HISTORY OF PRESENT ILLNESS:   Unable to obtain due to clinical condition, Following taken from HPI/neurology consult note 10/9:   Patient is a 72-year-old male with nicotine dependence who has not followed with physicians regularly, presented to the emergency department with complaint of substernal chest pain radiating to bilateral  arms. Patient reports he had initial episode on 10/3 upon awakening. It lasted approximately 15 minutes patient took aspirin at home and resolved. Patient had another episode of chest pain   radiating to bilateral arms this morning lasting approximately 15 minutes again took a dose of aspirin. SImilar event reoccurred the next afternoon and he decided to present to the emergency department for further evaluation.   In the ER patient was found to have inverted T waves in the lateral leads and markedly elevated troponin. Patient was chest pain-free upon arrival in the ER. Emergency department spoke   with interventional cardiology who recommend loading the patient with Plavix and starting him on heparin drip for planned cardiac catheterization. Cardiac cath showed (90% mLAD, 95% pLCX, 75% mRCA). He was planned to undergo bypass this week.   Stroke code was called on 10/9 after patient was found on the floor with a large right forehead laceration, aphasic, and with right sided weakness. [facility] for [facility] but demonstrated a hyperdensity within the proximal L MCA consistent with likely MCA thrombus. CTP significant for 300cc mismatch in L MCA territory. CTA head and neck performed with L MCA occlusion at the origin with absence of flow also noted involving the proximal portion of the left A1 segment.     PAST MEDICAL & SURGICAL HISTORY:  H/O inguinal hernia repair        FAMILY HISTORY:      SOCIAL HISTORY:  unable to obtain due to clinical condition    Allergies    No Known Allergies    Intolerances        REVIEW OF SYSTEMS:  Unable to obtain due to clinical condition       MEDICATIONS:  Antibiotics:    Neuro:  acetaminophen     Tablet .. 650 milliGRAM(s) Oral every 6 hours PRN    Anticoagulation:  aspirin  chewable 81 milliGRAM(s) Oral daily  heparin  Infusion 850 Unit(s)/Hr IV Continuous <Continuous>    OTHER:  atorvastatin 40 milliGRAM(s) Oral at bedtime  influenza  Vaccine (HIGH DOSE) 0.7 milliLiter(s) IntraMuscular once  pantoprazole    Tablet 40 milliGRAM(s) Oral before breakfast  phenylephrine    Infusion 0.1 MICROgram(s)/kG/Min IV Continuous <Continuous>  senna 2 Tablet(s) Oral at bedtime    IVF:  sodium chloride 0.9% lock flush 3 milliLiter(s) IV Push every 8 hours      Vital Signs Last 24 Hrs  T(C): 37.1 (09 Oct 2022 20:03), Max: 37.1 (09 Oct 2022 18:04)  T(F): 98.8 (09 Oct 2022 18:04), Max: 98.8 (09 Oct 2022 18:04)  HR: 45 (09 Oct 2022 22:00) (45 - 60)  BP: 132/68 (09 Oct 2022 22:00) (104/53 - 152/67)  BP(mean): 92 (09 Oct 2022 22:00) (73 - 94)  RR: 17 (09 Oct 2022 22:00) (14 - 20)  SpO2: 100% (09 Oct 2022 22:00) (91% - 100%)    Parameters below as of 09 Oct 2022 20:00  Patient On (Oxygen Delivery Method): nasal cannula  O2 Flow (L/min): 2      PHYSICAL EXAM:  GEN: laying in bed, appears well, NAD  NEURO: AOx3 (nods head y/n). FC, OE spont, +expressive aphasia, incomprehensible speech, +R facial. PERRL, EOMI. No pronator drift. MAEx4. RUE 4+/5 proximally, 5/5 handgrip. RLE deferred due to femoral access. LUE/LLE 5/5. SILT.  CV: RRR +S1/S2  PULM: CTAB  GI: NT/ND, +BS  EXT: ext warm, dry, nontender. DP/PT 1+ b/l  WOUND: R groin site c/d/i, nontender, soft to palpation    LABS:                        14.3   9.19  )-----------( 224      ( 09 Oct 2022 19:22 )             42.3     10    135  |  103  |  9   ----------------------------<  113<H>  4.0   |  20<L>  |  0.96    Ca    9.0      09 Oct 2022 19:22  Phos  3.1     10-  Mg     2.0     10    TPro  6.9  /  Alb  3.8  /  TBili  1.0  /  DBili  x   /  AST  19  /  ALT  14  /  AlkPhos  82  10-09    PT/INR - ( 09 Oct 2022 19:22 )   PT: 12.9 sec;   INR: 1.08          PTT - ( 09 Oct 2022 19:22 )  PTT:30.8 sec  Urinalysis Basic - ( 08 Oct 2022 04:34 )    Color: Yellow / Appearance: Clear / S.020 / pH: x  Gluc: x / Ketone: NEGATIVE  / Bili: Negative / Urobili: 0.2 E.U./dL   Blood: x / Protein: NEGATIVE mg/dL / Nitrite: NEGATIVE   Leuk Esterase: NEGATIVE / RBC: < 5 /HPF / WBC < 5 /HPF   Sq Epi: x / Non Sq Epi: 0-5 /HPF / Bacteria: Present /HPF      CULTURES:      RADIOLOGY & ADDITIONAL STUDIES:  CTH 10/9:  Hyperdensity within the proximal left MCA consistent with   likely MCA thrombus, no acute intracranial hemorrhage.    CTA 10/9:  1. Left MCA is occluded at its origin with absence of flow also noted   involving  the proximal portion of the left A1 segment.  2. No large vessel stenosis or occlusion detected involving the remaining   major  branches of the anterior or posterior intracranial circulation.    CTP 10/9:  Abnormal perfusion with RAPID calculated mismatch volume of   300 ml and mismatch ratio is infinite.    Assessment:  Patient is a 72-year-old male with nicotine dependence who has not followed with physicians regularly, presented to the emergency department with complaint of substernal chest pain radiating to bilateral arms, planned to undergo cardiac bypass this week. Stroke code was called on 10/9 after patient was found on the floor with a large right forehead laceration, aphasic, and with right sided weakness. He was brought to [facility] which was negative for hemorrhage but demonstrated a hyperdensity within the proximal L MCA consistent with likely MCA thrombus. CTP significant for 300cc mismatch in L MCA territory. CTA head and neck performed with L MCA occlusion at the origin with absence of flow also noted involving the proximal portion of the left A1 segment. NIHSS 21. Patient deemed not a tPA candidate 2/2 head trauma, now s/p cerebral angiogram for L M1 thrombectomy, TICI 0 to 3 (10/9)      Plan:  - continue neuro/vascular checks  - monitor R groin site for bleeding/hematoma  - HOB flat/bed rest x 6 hrs, can gradually elevate after as tolerated  - recommend SBP goal 120-160 for cerebral perfusion   - Per Dr Sosa, Can continue AC/antiplatelet as per primary team   - Recommend stroke core measures (TSH, lipid panel, TTE w/ bubble, a1c), recs as per stroke/neurology team  - recommend speech language eval for expressive aphasia/dysphagia screen   - continue care as per primary team     Discussed with Dr. Sosa   for questions/concerns please call [phone] CONSULT / POST OP NOTE:  s/p cerebral angiogram via R CFA for L M1 mechanical thrombectomy, TICI 0 to 3    HISTORY OF PRESENT ILLNESS:   Unable to obtain due to clinical condition, Following taken from HPI/neurology consult note 10/9:   Patient is a 72-year-old male with nicotine dependence who has not followed with physicians regularly, presented to the emergency department with complaint of substernal chest pain radiating to bilateral  arms. Patient reports he had initial episode on 10/3 upon awakening. It lasted approximately 15 minutes patient took aspirin at home and resolved. Patient had another episode of chest pain   radiating to bilateral arms this morning lasting approximately 15 minutes again took a dose of aspirin. SImilar event reoccurred the next afternoon and he decided to present to the emergency department for further evaluation.   In the ER patient was found to have inverted T waves in the lateral leads and markedly elevated troponin. Patient was chest pain-free upon arrival in the ER. Emergency department spoke   with interventional cardiology who recommend loading the patient with Plavix and starting him on heparin drip for planned cardiac catheterization. Cardiac cath showed (90% mLAD, 95% pLCX, 75% mRCA). He was planned to undergo bypass this week.   Stroke code was called on 10/9 after patient was found on the floor with a large right forehead laceration, aphasic, and with right sided weakness. [facility] but demonstrated a hyperdensity within the proximal L MCA consistent with likely MCA thrombus. CTP significant for 300cc mismatch in L MCA territory. CTA head and neck performed with L MCA occlusion at the origin with absence of flow also noted involving the proximal portion of the left A1 segment.     PAST MEDICAL & SURGICAL HISTORY:  H/O inguinal hernia repair        FAMILY HISTORY:      SOCIAL HISTORY:  unable to obtain due to clinical condition    Allergies    No Known Allergies    Intolerances        REVIEW OF SYSTEMS:  Unable to obtain due to clinical condition       MEDICATIONS:  Antibiotics:    Neuro:  acetaminophen     Tablet .. 650 milliGRAM(s) Oral every 6 hours PRN    Anticoagulation:  aspirin  chewable 81 milliGRAM(s) Oral daily  heparin  Infusion 850 Unit(s)/Hr IV Continuous <Continuous>    OTHER:  atorvastatin 40 milliGRAM(s) Oral at bedtime  influenza  Vaccine (HIGH DOSE) 0.7 milliLiter(s) IntraMuscular once  pantoprazole    Tablet 40 milliGRAM(s) Oral before breakfast  phenylephrine    Infusion 0.1 MICROgram(s)/kG/Min IV Continuous <Continuous>  senna 2 Tablet(s) Oral at bedtime    IVF:  sodium chloride 0.9% lock flush 3 milliLiter(s) IV Push every 8 hours      Vital Signs Last 24 Hrs  T(C): 37.1 (09 Oct 2022 20:03), Max: 37.1 (09 Oct 2022 18:04)  T(F): 98.8 (09 Oct 2022 18:04), Max: 98.8 (09 Oct 2022 18:04)  HR: 45 (09 Oct 2022 22:00) (45 - 60)  BP: 132/68 (09 Oct 2022 22:00) (104/53 - 152/67)  BP(mean): 92 (09 Oct 2022 22:00) (73 - 94)  RR: 17 (09 Oct 2022 22:00) (14 - 20)  SpO2: 100% (09 Oct 2022 22:00) (91% - 100%)    Parameters below as of 09 Oct 2022 20:00  Patient On (Oxygen Delivery Method): nasal cannula  O2 Flow (L/min): 2      PHYSICAL EXAM:  GEN: laying in bed, appears well, NAD  NEURO: AOx3 (nods head y/n). FC, OE spont, +expressive aphasia, incomprehensible speech, +R facial. PERRL, EOMI. No pronator drift. MAEx4. RUE 4+/5 proximally, 5/5 handgrip. RLE deferred due to femoral access. LUE/LLE 5/5. SILT.  CV: RRR +S1/S2  PULM: CTAB  GI: NT/ND, +BS  EXT: ext warm, dry, nontender. DP/PT 1+ b/l  WOUND: R groin site c/d/i, nontender, soft to palpation    LABS:                        14.3   9.19  )-----------( 224      ( 09 Oct 2022 19:22 )             42.3     10    135  |  103  |  9   ----------------------------<  113<H>  4.0   |  20<L>  |  0.96    Ca    9.0      09 Oct 2022 19:22  Phos  3.1     10-  Mg     2.0     10    TPro  6.9  /  Alb  3.8  /  TBili  1.0  /  DBili  x   /  AST  19  /  ALT  14  /  AlkPhos  82  10-09    PT/INR - ( 09 Oct 2022 19:22 )   PT: 12.9 sec;   INR: 1.08          PTT - ( 09 Oct 2022 19:22 )  PTT:30.8 sec  Urinalysis Basic - ( 08 Oct 2022 04:34 )    Color: Yellow / Appearance: Clear / S.020 / pH: x  Gluc: x / Ketone: NEGATIVE  / Bili: Negative / Urobili: 0.2 E.U./dL   Blood: x / Protein: NEGATIVE mg/dL / Nitrite: NEGATIVE   Leuk Esterase: NEGATIVE / RBC: < 5 /HPF / WBC < 5 /HPF   Sq Epi: x / Non Sq Epi: 0-5 /HPF / Bacteria: Present /HPF      CULTURES:      RADIOLOGY & ADDITIONAL STUDIES:  CTH 10/9:  Hyperdensity within the proximal left MCA consistent with   likely MCA thrombus, no acute intracranial hemorrhage.    CTA 10/9:  1. Left MCA is occluded at its origin with absence of flow also noted   involving  the proximal portion of the left A1 segment.  2. No large vessel stenosis or occlusion detected involving the remaining   major  branches of the anterior or posterior intracranial circulation.    CTP 10/9:  Abnormal perfusion with RAPID calculated mismatch volume of   300 ml and mismatch ratio is infinite.    Assessment:  Patient is a 72-year-old male with nicotine dependence who has not followed with physicians regularly, presented to the emergency department with complaint of substernal chest pain radiating to bilateral arms, planned to undergo cardiac bypass this week. Stroke code was called on 10/9 after patient was found on the floor with a large right forehead laceration, aphasic, and with right sided weakness. He was brought to [facility] which was negative for hemorrhage but demonstrated a hyperdensity within the proximal L MCA consistent with likely MCA thrombus. CTP significant for 300cc mismatch in L MCA territory. CTA head and neck performed with L MCA occlusion at the origin with absence of flow also noted involving the proximal portion of the left A1 segment. NIHSS 21. Patient deemed not a tPA candidate 2/2 head trauma, now s/p cerebral angiogram for L M1 thrombectomy, TICI 0 to 3 (10/9)      Plan:  - continue neuro/vascular checks  - monitor R groin site for bleeding/hematoma  - HOB flat/bed rest x 6 hrs, can gradually elevate after as tolerated  - recommend SBP goal 120-160 for cerebral perfusion   - No SCDs to R leg for 24 hrs post angio  - Per Dr Sosa, Can continue AC/antiplatelet as per primary team   - Recommend stroke core measures (TSH, lipid panel, TTE w/ bubble, a1c), recs as per stroke/neurology team  - recommend speech language eval for expressive aphasia/dysphagia screen   - continue care as per primary team     Discussed with Dr. Sosa   for questions/concerns please call [phone] CONSULT / POST OP NOTE:  s/p cerebral angiogram via R CFA for L M1 mechanical thrombectomy, TICI 0 to 3    HISTORY OF PRESENT ILLNESS:   Unable to obtain due to clinical condition, Following taken from HPI/neurology consult note 10/9:   Patient is a 72-year-old male with nicotine dependence who has not followed with physicians regularly, presented to the emergency department with complaint of substernal chest pain radiating to bilateral  arms. Patient reports he had initial episode on 10/3 upon awakening. It lasted approximately 15 minutes patient took aspirin at home and resolved. Patient had another episode of chest pain   radiating to bilateral arms this morning lasting approximately 15 minutes again took a dose of aspirin. SImilar event reoccurred the next afternoon and he decided to present to the emergency department for further evaluation.   In the ER patient was found to have inverted T waves in the lateral leads and markedly elevated troponin. Patient was chest pain-free upon arrival in the ER. Emergency department spoke   with interventional cardiology who recommend loading the patient with Plavix and starting him on heparin drip for planned cardiac catheterization. Cardiac cath showed (90% mLAD, 95% pLCX, 75% mRCA). He was planned to undergo bypass this week.   Stroke code was called on 10/9 after patient was found on the floor with a large right forehead laceration, aphasic, and with right sided weakness. [facility] but demonstrated a hyperdensity within the proximal L MCA consistent with likely MCA thrombus. CTP significant for 300cc mismatch in L MCA territory. CTA head and neck performed with L MCA occlusion at the origin with absence of flow also noted involving the proximal portion of the left A1 segment.     PAST MEDICAL & SURGICAL HISTORY:  H/O inguinal hernia repair        FAMILY HISTORY:      SOCIAL HISTORY:  unable to obtain due to clinical condition    Allergies    No Known Allergies    Intolerances        REVIEW OF SYSTEMS:  Unable to obtain due to clinical condition       MEDICATIONS:  Antibiotics:    Neuro:  acetaminophen     Tablet .. 650 milliGRAM(s) Oral every 6 hours PRN    Anticoagulation:  aspirin  chewable 81 milliGRAM(s) Oral daily  heparin  Infusion 850 Unit(s)/Hr IV Continuous <Continuous>    OTHER:  atorvastatin 40 milliGRAM(s) Oral at bedtime  influenza  Vaccine (HIGH DOSE) 0.7 milliLiter(s) IntraMuscular once  pantoprazole    Tablet 40 milliGRAM(s) Oral before breakfast  phenylephrine    Infusion 0.1 MICROgram(s)/kG/Min IV Continuous <Continuous>  senna 2 Tablet(s) Oral at bedtime    IVF:  sodium chloride 0.9% lock flush 3 milliLiter(s) IV Push every 8 hours      Vital Signs Last 24 Hrs  T(C): 37.1 (09 Oct 2022 20:03), Max: 37.1 (09 Oct 2022 18:04)  T(F): 98.8 (09 Oct 2022 18:04), Max: 98.8 (09 Oct 2022 18:04)  HR: 45 (09 Oct 2022 22:00) (45 - 60)  BP: 132/68 (09 Oct 2022 22:00) (104/53 - 152/67)  BP(mean): 92 (09 Oct 2022 22:00) (73 - 94)  RR: 17 (09 Oct 2022 22:00) (14 - 20)  SpO2: 100% (09 Oct 2022 22:00) (91% - 100%)    Parameters below as of 09 Oct 2022 20:00  Patient On (Oxygen Delivery Method): nasal cannula  O2 Flow (L/min): 2      PHYSICAL EXAM:  GEN: laying in bed, appears well, NAD  NEURO: AOx3 (nods head y/n). FC, OE spont, +expressive aphasia, incomprehensible speech, +R facial. PERRL, EOMI. No pronator drift. MAEx4. RUE 4+/5 proximally, 5/5 handgrip. RLE deferred due to femoral access. LUE/LLE 5/5. SILT.  CV: RRR +S1/S2  PULM: CTAB  GI: NT/ND, +BS  EXT: ext warm, dry, nontender. DP/PT 1+ b/l  WOUND: R groin site c/d/i, nontender, soft to palpation    LABS:                        14.3   9.19  )-----------( 224      ( 09 Oct 2022 19:22 )             42.3     10    135  |  103  |  9   ----------------------------<  113<H>  4.0   |  20<L>  |  0.96    Ca    9.0      09 Oct 2022 19:22  Phos  3.1     10-  Mg     2.0     10    TPro  6.9  /  Alb  3.8  /  TBili  1.0  /  DBili  x   /  AST  19  /  ALT  14  /  AlkPhos  82  10-09    PT/INR - ( 09 Oct 2022 19:22 )   PT: 12.9 sec;   INR: 1.08          PTT - ( 09 Oct 2022 19:22 )  PTT:30.8 sec  Urinalysis Basic - ( 08 Oct 2022 04:34 )    Color: Yellow / Appearance: Clear / S.020 / pH: x  Gluc: x / Ketone: NEGATIVE  / Bili: Negative / Urobili: 0.2 E.U./dL   Blood: x / Protein: NEGATIVE mg/dL / Nitrite: NEGATIVE   Leuk Esterase: NEGATIVE / RBC: < 5 /HPF / WBC < 5 /HPF   Sq Epi: x / Non Sq Epi: 0-5 /HPF / Bacteria: Present /HPF      CULTURES:      RADIOLOGY & ADDITIONAL STUDIES:  CTH 10/9:  Hyperdensity within the proximal left MCA consistent with   likely MCA thrombus, no acute intracranial hemorrhage.    CTA 10/9:  1. Left MCA is occluded at its origin with absence of flow also noted   involving  the proximal portion of the left A1 segment.  2. No large vessel stenosis or occlusion detected involving the remaining   major  branches of the anterior or posterior intracranial circulation.    CTP 10/9:  Abnormal perfusion with RAPID calculated mismatch volume of   300 ml and mismatch ratio is infinite.    Assessment:  Patient is a 72-year-old male with nicotine dependence who has not followed with physicians regularly, presented to the emergency department with complaint of substernal chest pain radiating to bilateral arms, planned to undergo cardiac bypass this week. Stroke code was called on 10/9 after patient was found on the floor with a large right forehead laceration, aphasic, and with right sided weakness. He was brought to [facility] which was negative for hemorrhage but demonstrated a hyperdensity within the proximal L MCA consistent with likely MCA thrombus. CTP significant for 300cc mismatch in L MCA territory. CTA head and neck performed with L MCA occlusion at the origin with absence of flow also noted involving the proximal portion of the left A1 segment. NIHSS 21. Patient deemed not a tPA candidate 2/2 head trauma, now s/p cerebral angiogram for L M1 thrombectomy, TICI 0 to 3 (10/9)      Plan:  - continue neuro/vascular checks  - monitor R groin site for bleeding/hematoma  - HOB flat/bed rest x 6 hrs, can gradually elevate after as tolerated  - recommend SBP goal 120-160 for cerebral perfusion   - No SCDs to R leg for 24 hrs post angio  - Per Dr Sosa, Can continue AC/antiplatelet as per primary team   - Recommend stroke core measures (TSH, lipid panel, TTE w/ bubble, a1c), recs as per stroke/neurology team  - recommend speech language eval for expressive aphasia/dysphagia screen   - Advance diet as tolerated  - continue care as per primary team     Discussed with Dr. Sosa   for questions/concerns please call [phone]

## 2022-10-09 NOTE — CONSULT NOTE ADULT - ASSESSMENT
Patient is a 72-year-old male with nicotine dependence who has not followed with physicians regularly, presented to the emergency department with complaint of substernal chest pain radiating to bilateral  arm, planned to undergo cardiac bypass this week. Stroke code was called on 10/9 after patient was found on the floor with a large right forehead laceration, aphasic, and with right sided weakness. He was brought to [facility] which was negative for hemorrhage but demonstrate hyperdensity within the proximal L MCA consistent with likely MCA thrombus. CTP significant for 300cc mismatch in L MCA territory. CTA head and neck performed, pending official report, however, unable to visualize L MCA. NIHSS 21. Patient deemed not a tPA candidate 2/2 head trauma.     - patient taken to thrombectomy with Dr. Sosa  - Repeat CT head with any acute change in mental status.   - Obtain Hemoglobin A1c, Lipid profile set, and TSH if have not already    Stroke team will continue to follow. Discussed with Dr. Greene   Patient is a 72-year-old male with nicotine dependence who has not followed with physicians regularly, presented to the emergency department with complaint of substernal chest pain radiating to bilateral arms, planned to undergo cardiac bypass this week. Stroke code was called on 10/9 after patient was found on the floor with a large right forehead laceration, aphasic, and with right sided weakness. He was brought to [facility] which was negative for hemorrhage but demonstrated a hyperdensity within the proximal L MCA consistent with likely MCA thrombus. CTP significant for 300cc mismatch in L MCA territory. CTA head and neck performed with L MCA occlusion at the origin with absence of flow also noted involving the proximal portion of the left A1 segment. NIHSS 21. Patient deemed not a tPA candidate 2/2 head trauma.     - patient taken to thrombectomy with Dr. Sosa  - Repeat CT head with any acute change in mental status.    Stroke team will continue to follow. Discussed with Dr. Greene

## 2022-10-09 NOTE — BRIEF OPERATIVE NOTE - NSICDXBRIEFPROCEDURE_GEN_ALL_CORE_FT
PROCEDURES:  Angiogram, cerebral, with mechanical thrombectomy 09-Oct-2022 21:26:08  Manuel Alexis

## 2022-10-09 NOTE — PROGRESS NOTE ADULT - ASSESSMENT
73 YO Male, current smoker, with no known PMHx originally presented to [facility] c/o 2 episodes of chest pain. Patient states that on 10/3, while at rest, he began to experience substernal chest pain w/ radiation to bilateral upper extremities, lasted about 15 minutes and resolved with Aspirin. On 10/6 patient experienced another episode of similar chest pain that again resolved with Aspirin, prompting patient to go to the ED. While in [facility] ED, patient's troponin was elevated and EKG was noted to have inverted T-waves in the lateral leads. Patient given Plavix load and started on heparin gtt. On 10/7 patient underwent cardiac cath that revealed 90% mLAD, 95% pLCX, 75% mRCA. Patient was transferred to [facility] under the care of Dr. Collins for further work-up and intervention. Patient is presently undergoing pre-operative testing. TTE significant for EF of 15-20%.     Plan:    Neurovascular:   -Pain well controlled with current regimen. PRN's: Tylenol 650mg PO Q6H PRN mild pain    Cardiovascular:   -3v CAD   -Pre-op work-up for CABG, tentative schedule for Tuesday 10/11/22.  -Cont Lipitor and Aspirin  -Toprol held 2/2 asymptomatic bradycardia to 40/50s  -Cont Imdur 30mg QD  -Will transition therapeutic LVX to heparin gtt today in preparation for OR  -Hemodynamically stable.   -Monitor: BP, HR, tele    Respiratory:   -Current, 1/2 ppd daily smoker x30-40 years  -F/u CT chest noncontrast  -Will obtain room air ABG  -Oxygenating well on room air  -Encourage continued use of IS 10x/hr and frequent ambulation  -CXR: stable    GI:  -GI PPX: Protonix  -PO Diet  -Bowel Regimen: Senna     Renal / :  -Continue to monitor renal function: BUN/Cr: 11/1.09  -Monitor I/O's daily     Endocrine:    -No hx of DM or thyroid dx  -A1c: 5.4  -TSH: 2.29    Hematologic:  -CBC: H/H- 13.4/39.9  -Coagulation Panel.    ID:  -Temperature: Afebrile   -CBC: WBC- 6.92  -Continue to observe for SIRS/Sepsis Syndrome.    Prophylaxis:  -Start heparin gtt  -Continue with SCD's b/l while patient is at rest     Disposition:  -Pre-op planning for CABG 10/11

## 2022-10-09 NOTE — CHART NOTE - NSCHARTNOTEFT_GEN_A_CORE
At approximately 1800, alerted by RN that patient was found on the floor.    Upon arrival to patient's room, he was found on the ground directly adjacent to bed w/ bleeding wound to R anterior aspect of head. Patient was awake, but unable to verbally respond or follow commands. Stroke code called and CT stroke protocol ordered.    PHYSICAL EXAM:  GENERAL: Patient found on the floor   HEAD: R bleeding wound noted on R anterior head.   EYES: L-sided eye drift, conjunctiva and sclera clear  ENT: Moist mucous membranes  CHEST/LUNG: CTAB  HEART: RRR  NEURO: Nonverbal, +R sided motor deficits, R facial droop present.     < from: CT Brain Stroke Protocol (10.09.22 @ 17:59) >    FINDINGS: The CT examination demonstrates the ventricles, cisternal   spaces, and cortical sulci to be within normal limits. There is no   midline shift or extra axial collections. The gray white differentiation   appears within normal limits. There is no intracranial hemorrhage. There   is hyperdensity within the proximal left MCA consistent with likely MCA   thrombus. The bony windows demonstrates no fractures. There is mucosal   thickening of bilateral ethmoid sinuses. The mastoid air cells are well   aerated. Mild right frontal scalp soft tissue swelling/hematoma.    IMPRESSION: Hyperdensity within the proximal left MCA consistent with   likely MCA thrombus, no acute intracranial hemorrhage.    Assessment: Patient s/p L MCA thrombus    Plan:   -Patient taken to CTICU for closer monitoring. A-line placed at bedside.  -Plan for IR for emergent thrombectomy.   -Recs as per stroke team appreciated. At approximately 1800, alerted by RN that patient was found on the floor.    Upon arrival to patient's room, he was found on the ground directly adjacent to bed w/ bleeding wound to R anterior aspect of head. Patient was awake and alert, but unable to verbally respond or follow commands. Stroke code called and CT stroke protocol ordered.    PHYSICAL EXAM:  GENERAL: Awake and alert, found on the floor  HEAD: R bleeding wound noted on R anterior head.   EYES: L-sided eye drift, conjunctiva and sclera clear  ENT: Moist mucous membranes  CHEST/LUNG: CTAB  HEART: RRR  NEURO: Nonverbal, +R sided motor deficits, R facial droop present.     < from: CT Brain Stroke Protocol (10.09.22 @ 17:59) >    FINDINGS: The CT examination demonstrates the ventricles, cisternal   spaces, and cortical sulci to be within normal limits. There is no   midline shift or extra axial collections. The gray white differentiation   appears within normal limits. There is no intracranial hemorrhage. There   is hyperdensity within the proximal left MCA consistent with likely MCA   thrombus. The bony windows demonstrates no fractures. There is mucosal   thickening of bilateral ethmoid sinuses. The mastoid air cells are well   aerated. Mild right frontal scalp soft tissue swelling/hematoma.    IMPRESSION: Hyperdensity within the proximal left MCA consistent with   likely MCA thrombus, no acute intracranial hemorrhage.    Assessment: Patient s/p L MCA thrombus    Plan:   -Patient taken to CTICU for closer monitoring. A-line placed at bedside.  -Plan for IR for emergent thrombectomy.   -Recs as per stroke team appreciated.

## 2022-10-09 NOTE — BRIEF OPERATIVE NOTE - OPERATION/FINDINGS
Emergent femoral cerebral angiogram performed under MAC sedation via right CFA using an 8Fr Long sheath. Left ICA injection with evidence of Left M1 occlusion. Mechanical thrombectomy performed using aspiration, one pass, with recannulization of the left M1. TICI 0 to TICI 3. Right groin closed with angioseal with hemostasis obtained. Patient remained hemodynamically stable throughout. Will recover in the CTICU.    Full report to follow, d/w Dr. Sosa

## 2022-10-09 NOTE — PROGRESS NOTE ADULT - SUBJECTIVE AND OBJECTIVE BOX
CTICU  CRITICAL  CARE  attending     Hand off received 					   Pertinent clinical, laboratory, radiographic, hemodynamic, echocardiographic, respiratory data, microbiologic data and chart were reviewed and analyzed frequently throughout the course of the day and night        72 years old male nicotine dependence who has not followed with physicians regularly  He presented to the emergency department with complaint of substernal chest pain radiating to bilateral arms.  Patient reports he had initial episode on 10/3 upon awakening. It lasted approximately 15 minutes patient took aspirin at home and resolved. Patient had another episode of chest pain   radiating to bilateral arms this morning lasting approximately 15 minutes again took a dose of aspirin.   He presented to the emergency department for further evaluation.   In the ER patient was found to have inverted T waves in the lateral leads and markedly elevated troponin. Patient was chest pain-free upon arrival in the ER.   Emergency department spoke with interventional cardiology who recommend loading the patient with Plavix starting on heparin drip for planned cardiac catheterization.     Cardiac cath showed (90% mLAD, 95% pLCX, 75% mRCA).   He was waiting for CABG.  He fell on the floor after a syncopal episode.    CT Head: Proxima  left Middle Cerebral Artery thrombus. No acute intracranial hemorrhage.  CT perfusion study demonstrates a large near hemispheric area of elevated MTT involving the left frontal, temporal and parietal lobe without corresponding decrease in both CBV or CBF.   The calculated RAPID CBF volume < 30% is 0 ml and a Tmax volume > 6 seconds is 300 ml meaning the mismatch volume is 300 ml and mismatch ratio is infinite.  He was emergently taken for cerebral angiogram and aspiration thrombectomy of the left MCA.        FAMILY HISTORY:  PAST MEDICAL & SURGICAL HISTORY:  H/O inguinal hernia repair        14 system review was unremarkable    Vital signs, hemodynamic and respiratory parameters were reviewed from the bedside nursing flow sheet.  ICU Vital Signs Last 24 Hrs  T(C): 36.7 (10 Oct 2022 01:23), Max: 37.1 (09 Oct 2022 18:04)  T(F): 98 (10 Oct 2022 01:23), Max: 98.8 (09 Oct 2022 18:04)  HR: 55 (10 Oct 2022 03:00) (45 - 60)  BP: 136/71 (10 Oct 2022 03:00) (112/57 - 152/67)  BP(mean): 99 (10 Oct 2022 03:00) (73 - 99)  ABP: 101/90 (10 Oct 2022 03:00) (93/69 - 154/79)  ABP(mean): 95 (10 Oct 2022 03:00) (76 - 113)  RR: 17 (10 Oct 2022 03:00) (14 - 20)  SpO2: 99% (10 Oct 2022 03:00) (91% - 100%)    O2 Parameters below as of 10 Oct 2022 03:00  Patient On (Oxygen Delivery Method): nasal cannula w/ humidification  O2 Flow (L/min): 2        Adult Advanced Hemodynamics Last 24 Hrs  CVP(mm Hg): --  CVP(cm H2O): --  CO: --  CI: --  PA: --  PA(mean): --  PCWP: --  SVR: --  SVRI: --  PVR: --  PVRI: --, ABG - ( 10 Oct 2022 02:12 )  pH, Arterial: 7.40  pH, Blood: x     /  pCO2: 34    /  pO2: 134   / HCO3: 21    / Base Excess: -3.0  /  SaO2: 99.5                Intake and output was reviewed and the fluid balance was calculated  Daily Height in cm: 177.8 (09 Oct 2022 20:03)    Daily   I&O's Summary    08 Oct 2022 07:01  -  09 Oct 2022 07:00  --------------------------------------------------------  IN: 0 mL / OUT: 300 mL / NET: -300 mL    09 Oct 2022 07:01  -  10 Oct 2022 03:46  --------------------------------------------------------  IN: 159.5 mL / OUT: 125 mL / NET: 34.5 mL        All lines and drain sites were assessed    Neuro: Mild aphasia and dysarthria. No pronator drift. Pupils 2-3 mm (Reactive).  Improved strength in the RUE and RLE. (4/5). (Left side 5/5)  Neck: No JVD.  CVS: S1, S2, No S3.  Lungs: Good air entry bilaterally.  Abd: Soft. No tenderness. + Bowel sounds.  Vascular: + DP/PT.  Extremities: No edema.  Lymphatic: Normal.  Skin: Skin laceration in the forehead is covered with a dressing.      labs  CBC Full  -  ( 10 Oct 2022 02:12 )  WBC Count : 11.12 K/uL  RBC Count : 4.19 M/uL  Hemoglobin : 13.6 g/dL  Hematocrit : 39.5 %  Platelet Count - Automated : 218 K/uL  Mean Cell Volume : 94.3 fl  Mean Cell Hemoglobin : 32.5 pg  Mean Cell Hemoglobin Concentration : 34.4 gm/dL  Auto Neutrophil # : x  Auto Lymphocyte # : x  Auto Monocyte # : x  Auto Eosinophil # : x  Auto Basophil # : x  Auto Neutrophil % : x  Auto Lymphocyte % : x  Auto Monocyte % : x  Auto Eosinophil % : x  Auto Basophil % : x    10-10    138  |  106  |  9   ----------------------------<  100<H>  4.0   |  21<L>  |  0.94    Ca    8.4      10 Oct 2022 02:12  Phos  3.1     10-09  Mg     2.0     10-10    TPro  6.9  /  Alb  3.8  /  TBili  1.0  /  DBili  x   /  AST  19  /  ALT  14  /  AlkPhos  82  10-09    PT/INR - ( 10 Oct 2022 02:12 )   PT: 12.8 sec;   INR: 1.07          PTT - ( 10 Oct 2022 02:12 )  PTT:39.0 sec  The current medications were reviewed   MEDICATIONS  (STANDING):  aspirin  chewable 81 milliGRAM(s) Oral daily  atorvastatin 40 milliGRAM(s) Oral at bedtime  heparin  Infusion 850 Unit(s)/Hr (8.5 mL/Hr) IV Continuous <Continuous>  influenza  Vaccine (HIGH DOSE) 0.7 milliLiter(s) IntraMuscular once  pantoprazole    Tablet 40 milliGRAM(s) Oral before breakfast  phenylephrine    Infusion 0.1 MICROgram(s)/kG/Min (2.68 mL/Hr) IV Continuous <Continuous>  senna 2 Tablet(s) Oral at bedtime  sodium chloride 0.9% lock flush 3 milliLiter(s) IV Push every 8 hours    MEDICATIONS  (PRN):  acetaminophen     Tablet .. 650 milliGRAM(s) Oral every 6 hours PRN Temp greater or equal to 38C (100.4F), Mild Pain (1 - 3)            72 years old  Male admitted with triple vessel Coronary Artery Disease.  Hospital course complicated by CVA caused by thrombosis of the left middle cerebral artery.  S/P Left middle cerebral artery thrombectomy.   Emergent femoral cerebral angiogram performed under MAC sedation via right CFA using an 8Fr Long sheath. Left ICA injection with evidence of Left M1 occlusion. Mechanical thrombectomy performed using aspiration, one pass, with recannulization of the left M1. TICI 0 to TICI 3.   Improved strength and coordination in the RULE and RLE.  Sinus bradycardia.  Metabolic acidosis.   Hemodynamically stable.  Good oxygenation.  Fair urine out put.        My plan includes :  Keep MAP close to 90.  High dose Statin Rx.  Close hemodynamic, ventilatory and drain monitoring and management  Monitor for arrhythmias and monitor parameters for organ perfusion  Monitor neurologic status  Monitor renal function.  Head of the bed should remain elevated to 45 deg .   Chest PT and IS will be encouraged  Monitor adequacy of oxygenation and ventilation and attempt to wean oxygen  Nutritional goals will be met using po eventually , ensure adequate caloric intake and monitor the same  Stress ulcer and VTE prophylaxis will be achieved    Glycemic control is satisfactory  Electrolytes have been repleted as necessary and wound care has been carried out. Pain control has been achieved.   Aggressive physical therapy and early mobility and ambulation goals will be met   The family was updated about the course and plan  CRITICAL CARE TIME SPENT in evaluation and management, reassessments, review and interpretation of labs and x-rays, ventilator and hemodynamic management, formulating a plan and coordinating care: ___90____ MIN.  Time does not include procedural time.  CTICU ATTENDING     					    Nik Shaw MD

## 2022-10-09 NOTE — STROKE CODE NOTE - NIH STROKE SCALE: 2. BEST GAZE, QM
(2) Forced deviation, or total gaze paresis not overcome by the oculocephalic maneuver (1) Partial gaze palsy; gaze is abnormal in one or both eyes, but forced deviation or total gaze paresis is not present

## 2022-10-09 NOTE — PROGRESS NOTE ADULT - SUBJECTIVE AND OBJECTIVE BOX
Planned Date of Surgery: 2022                                                                                                           Surgeon/Attending MD: Dr. Collins    Procedure: CABG     Subjective: Patient seen and examined at bedside. Patient     HPI: 73 YO Male, current smoker, with no known PMHx originally presented to [facility] c/o 2 episodes of chest pain. Patient states that on 10/3, while at rest, he began to experience substernal chest pain w/ radiation to bilateral upper extremities, lasted about 15 minutes and resolved with Aspirin. On 10/6 patient experienced another episode of similar chest pain that again resolved with Aspirin, prompting patient to go to the ED. While in [facility] ED, patient's troponin was elevated and EKG was noted to have inverted T-waves in the lateral leads. Patient given Plavix load and started on heparin gtt. On 10/7 patient underwent cardiac cath that revealed 90% mLAD, 95% pLCX, 75% mRCA. Patient was transferred to [facility] under the care of Dr. Collins for further work-up and intervention. Patient is presently undergoing pre-operative testing. TTE significant for EF of 15-20%.     PAST MEDICAL & SURGICAL HISTORY:  H/O inguinal hernia repair    No Known Allergies    Vitals:  T(C): 36.9 (10-09-22 @ 09:55), Max: 37.2 (10-08-22 @ 21:10)  HR: 54 (10-09-22 @ 09:05) (46 - 54)  BP: 126/64 (10-09-22 @ 09:05) (101/55 - 126/64)  RR: 15 (10-09-22 @ 04:55) (12 - 16)  SpO2: 97% (10-09-22 @ 09:05) (94% - 98%)    Physical Exam  GENERAL: NAD, lying in bed comfortably  HEAD:  Atraumatic, Normocephalic  EYES: EOMI, PERRLA, conjunctiva and sclera clear  ENT: Moist mucous membranes  NECK: Supple, No JVD  CHEST/LUNG: Clear to auscultation bilaterally; No rales, rhonchi, wheezing, or rubs. Unlabored respirations  HEART: Regular rate and rhythm; No murmurs, rubs, or gallops  ABDOMEN: Bowel sounds present; Soft, Nontender, Nondistended. No hepatomegally  EXTREMITIES:  2+ Peripheral Pulses, brisk capillary refill. No clubbing, cyanosis, or edema  NERVOUS SYSTEM:  Alert & Oriented X3, speech clear. No deficits   MSK: FROM all 4 extremities, full and equal strength  SKIN: No rashes or lesions    MEDICATIONS  (STANDING):  aspirin  chewable 81 milliGRAM(s) Oral daily  atorvastatin 40 milliGRAM(s) Oral at bedtime  enoxaparin Injectable 70 milliGRAM(s) SubCutaneous every 12 hours  influenza  Vaccine (HIGH DOSE) 0.7 milliLiter(s) IntraMuscular once  isosorbide   mononitrate ER Tablet (IMDUR) 30 milliGRAM(s) Oral daily  pantoprazole    Tablet 40 milliGRAM(s) Oral before breakfast  senna 2 Tablet(s) Oral at bedtime  sodium chloride 0.9% lock flush 3 milliLiter(s) IV Push every 8 hours    MEDICATIONS  (PRN):  acetaminophen     Tablet .. 650 milliGRAM(s) Oral every 6 hours PRN Temp greater or equal to 38C (100.4F), Mild Pain (1 - 3)    On Beta Blocker? NO / CONTRAINDICATED Reason__bradycardia____  On Aspirin 81mg? YES     Labs:                        13.4   6.92  )-----------( 195      ( 09 Oct 2022 05:30 )             39.9     10-09    141  |  107  |  11  ----------------------------<  87  3.9   |  22  |  1.09    Ca    9.1      09 Oct 2022 05:30  Phos  3.5     10-08  Mg     1.9     10-09    TPro  6.4  /  Alb  3.7  /  TBili  1.1  /  DBili  x   /  AST  18  /  ALT  12  /  AlkPhos  76  10-08    PT/INR - ( 08 Oct 2022 05:30 )   PT: 12.4 sec;   INR: 1.04       PTT - ( 08 Oct 2022 05:30 )  PTT:36.0 sec  Urinalysis Basic - ( 08 Oct 2022 04:34 )    Color: Yellow / Appearance: Clear / S.020 / pH: x  Gluc: x / Ketone: NEGATIVE  / Bili: Negative / Urobili: 0.2 E.U./dL   Blood: x / Protein: NEGATIVE mg/dL / Nitrite: NEGATIVE   Leuk Esterase: NEGATIVE / RBC: < 5 /HPF / WBC < 5 /HPF   Sq Epi: x / Non Sq Epi: 0-5 /HPF / Bacteria: Present /HPF    ABO Interpretation: O (10-08-22 @ 07:07)    CARDIAC MARKERS ( 08 Oct 2022 20:27 )  x     / 0.32 ng/mL / x     / x     / x      CARDIAC MARKERS ( 08 Oct 2022 05:30 )  x     / 0.34 ng/mL / x     / x     / x        Preoperative Checklist: (x = completed, n/a = not applicable, p = pending)  ______ECHO  ______PA/Lateral CXR  ______Carotid Duplex  ______CT imaging  ______PFTs  ______UA  ______Baseline Labs (CMP, CBC w/ diff, PTT, PT/INR)  ______A1c  ______TSH  ______Lipid panel  ______Cardiac enzymes  ______Pro BNP  ______EKG   ______T&S 1  ______T&S 2 (within 72 hours)  ______COVID PCR (within 72 hours)  ______Room air ABG  ______P2Y12  ______bHCG  ______Consents  ______Pre-op Orders Placed  ______Blood Products Ordered  ______NPO at midnight  ______Conduit tested    Abnormal/Noteworthy Preoperative Testing Results:      Planned Date of Surgery: 2022                                                                                                           Surgeon/Attending MD: Dr. Collins    Procedure: CABG     Subjective: Patient seen and examined at bedside. Patient expresses that he is still unsure about proceeding with the surgery, but offers no physical complaints. Denies chills, chest pain, SOB, palpitations, N/V.     HPI: 73 YO Male, current smoker, with no known PMHx originally presented to [facility] c/o 2 episodes of chest pain. Patient states that on 10/3, while at rest, he began to experience substernal chest pain w/ radiation to bilateral upper extremities, lasted about 15 minutes and resolved with Aspirin. On 10/6 patient experienced another episode of similar chest pain that again resolved with Aspirin, prompting patient to go to the ED. While in [facility] ED, patient's troponin was elevated and EKG was noted to have inverted T-waves in the lateral leads. Patient given Plavix load and started on heparin gtt. On 10/7 patient underwent cardiac cath that revealed 90% mLAD, 95% pLCX, 75% mRCA. Patient was transferred to [facility] under the care of Dr. Collins for further work-up and intervention. Patient is presently undergoing pre-operative testing. TTE significant for EF of 15-20%.     PAST MEDICAL & SURGICAL HISTORY:  H/O inguinal hernia repair    No Known Allergies    Vitals:  T(C): 36.9 (10-09-22 @ 09:55), Max: 37.2 (10-08-22 @ 21:10)  HR: 54 (10-09-22 @ 09:05) (46 - 54)  BP: 126/64 (10-09-22 @ 09:05) (101/55 - 126/64)  RR: 15 (10-09-22 @ 04:55) (12 - 16)  SpO2: 97% (10-09-22 @ 09:05) (94% - 98%)    Physical Exam  GENERAL: NAD, lying in bed comfortably  HEAD:  Atraumatic, Normocephalic  EYES: EOMI, PERRLA, conjunctiva and sclera clear  ENT: Moist mucous membranes  CHEST/LUNG: CTAB; No rales, rhonchi, wheezing, or rubs. Unlabored respirations  HEART: Regular rate and rhythm; No murmurs, rubs, or gallops  ABDOMEN: Bowel sounds present; Soft, Nontender, Nondistended. No hepatomegally  GROIN: R groin dressing removed  EXTREMITIES:  2+ Peripheral Pulses, brisk capillary refill. No clubbing, cyanosis, or edema  NERVOUS SYSTEM:  Alert & Oriented X3, speech clear. No deficits     MEDICATIONS  (STANDING):  aspirin  chewable 81 milliGRAM(s) Oral daily  atorvastatin 40 milliGRAM(s) Oral at bedtime  enoxaparin Injectable 70 milliGRAM(s) SubCutaneous every 12 hours  influenza  Vaccine (HIGH DOSE) 0.7 milliLiter(s) IntraMuscular once  isosorbide   mononitrate ER Tablet (IMDUR) 30 milliGRAM(s) Oral daily  pantoprazole    Tablet 40 milliGRAM(s) Oral before breakfast  senna 2 Tablet(s) Oral at bedtime  sodium chloride 0.9% lock flush 3 milliLiter(s) IV Push every 8 hours    MEDICATIONS  (PRN):  acetaminophen     Tablet .. 650 milliGRAM(s) Oral every 6 hours PRN Temp greater or equal to 38C (100.4F), Mild Pain (1 - 3)    On Beta Blocker? NO / CONTRAINDICATED Reason__bradycardia____  On Aspirin 81mg? YES     Labs:                        13.4   6.92  )-----------( 195      ( 09 Oct 2022 05:30 )             39.9     10-    141  |  107  |  11  ----------------------------<  87  3.9   |  22  |  1.09    Ca    9.1      09 Oct 2022 05:30  Phos  3.5     10-08  Mg     1.9     10-    TPro  6.4  /  Alb  3.7  /  TBili  1.1  /  DBili  x   /  AST  18  /  ALT  12  /  AlkPhos  76  10-08    PT/INR - ( 08 Oct 2022 05:30 )   PT: 12.4 sec;   INR: 1.04       PTT - ( 08 Oct 2022 05:30 )  PTT:36.0 sec  Urinalysis Basic - ( 08 Oct 2022 04:34 )    Color: Yellow / Appearance: Clear / S.020 / pH: x  Gluc: x / Ketone: NEGATIVE  / Bili: Negative / Urobili: 0.2 E.U./dL   Blood: x / Protein: NEGATIVE mg/dL / Nitrite: NEGATIVE   Leuk Esterase: NEGATIVE / RBC: < 5 /HPF / WBC < 5 /HPF   Sq Epi: x / Non Sq Epi: 0-5 /HPF / Bacteria: Present /HPF    ABO Interpretation: O (10-08-22 @ 07:07)    CARDIAC MARKERS ( 08 Oct 2022 20:27 )  x     / 0.32 ng/mL / x     / x     / x      CARDIAC MARKERS ( 08 Oct 2022 05:30 )  x     / 0.34 ng/mL / x     / x     / x        Preoperative Checklist: (x = completed, n/a = not applicable, p = pending)  X___ECHO  X____PA/Lateral CXR  X____Carotid Duplex  N/A___CT imaging  X____PFTs  X____UA  X____Baseline Labs (CMP, CBC w/ diff, PTT, PT/INR)  X___A1c  X___TSH  X___Lipid panel  X____Cardiac enzymes  X____Pro BNP  X___EKG   X____T&S 1  P____T&S 2 (within 72 hours)  P____COVID PCR (within 72 hours)  ______Room air ABG  X____P2Y12  N/A____bHCG  ______Consents  ______Pre-op Orders Placed  ______Blood Products Ordered  ______NPO at midnight  ______Conduit tested    Abnormal/Noteworthy Preoperative Testing Results:   < from: TTE Echo Complete w/ Contrast w/ Doppler (10.08.22 @ 08:07) >  CONCLUSIONS:     1. Multiple segmental abnormalities exist. See findings.   2. Dilated left ventriclular size.   3. Severely reduced left ventricular systolic function.   4. Regional wall motion abnormalities consistent with ischemic heart   disease.   5. Grade I left ventricular diastolic dysfunction.   6. Normal right ventricular size and systolic function.   7. Dilated left atrium.   8. Mild-to-moderate mitral regurgitation.   9. Pulmonary artery systolic pressure is 27 mmHg.  10. No pericardial effusion.  11. No prior echo is available for comparison.    < end of copied text >       Planned Date of Surgery: 2022                                                                                                           Surgeon/Attending MD: Dr. Collins    Procedure: CABG     Subjective: Patient seen and examined at bedside. Patient expresses that he is still unsure about proceeding with the surgery, but offers no physical complaints. Denies chills, chest pain, SOB, palpitations, N/V.     HPI: 73 YO Male, current smoker, with no known PMHx originally presented to [facility] c/o 2 episodes of chest pain. Patient states that on 10/3, while at rest, he began to experience substernal chest pain w/ radiation to bilateral upper extremities, lasted about 15 minutes and resolved with Aspirin. On 10/6 patient experienced another episode of similar chest pain that again resolved with Aspirin, prompting patient to go to the ED. While in [facility] ED, patient's troponin was elevated and EKG was noted to have inverted T-waves in the lateral leads. Patient given Plavix load and started on heparin gtt. On 10/7 patient underwent cardiac cath that revealed 90% mLAD, 95% pLCX, 75% mRCA. Patient was transferred to [facility] under the care of Dr. Collins for further work-up and intervention. Patient is presently undergoing pre-operative testing. TTE significant for EF of 15-20%.     PAST MEDICAL & SURGICAL HISTORY:  H/O inguinal hernia repair    No Known Allergies    Vitals:  T(C): 36.9 (10-09-22 @ 09:55), Max: 37.2 (10-08-22 @ 21:10)  HR: 54 (10-09-22 @ 09:05) (46 - 54)  BP: 126/64 (10-09-22 @ 09:05) (101/55 - 126/64)  RR: 15 (10-09-22 @ 04:55) (12 - 16)  SpO2: 97% (10-09-22 @ 09:05) (94% - 98%)    Physical Exam  GENERAL: NAD, lying in bed comfortably  HEAD:  Atraumatic, Normocephalic  EYES: EOMI, PERRLA, conjunctiva and sclera clear  ENT: Moist mucous membranes  CHEST/LUNG: CTAB; No rales, rhonchi, wheezing, or rubs. Unlabored respirations  HEART: Regular rate and rhythm; No murmurs, rubs, or gallops  ABDOMEN: Bowel sounds present; Soft, Nontender, Nondistended. No hepatomegally  GROIN: R groin dressing removed  EXTREMITIES:  2+ Peripheral Pulses, brisk capillary refill. No clubbing, cyanosis, or edema  NERVOUS SYSTEM:  Alert & Oriented X3, speech clear. No deficits     MEDICATIONS  (STANDING):  aspirin  chewable 81 milliGRAM(s) Oral daily  atorvastatin 40 milliGRAM(s) Oral at bedtime  enoxaparin Injectable 70 milliGRAM(s) SubCutaneous every 12 hours  influenza  Vaccine (HIGH DOSE) 0.7 milliLiter(s) IntraMuscular once  isosorbide   mononitrate ER Tablet (IMDUR) 30 milliGRAM(s) Oral daily  pantoprazole    Tablet 40 milliGRAM(s) Oral before breakfast  senna 2 Tablet(s) Oral at bedtime  sodium chloride 0.9% lock flush 3 milliLiter(s) IV Push every 8 hours    MEDICATIONS  (PRN):  acetaminophen     Tablet .. 650 milliGRAM(s) Oral every 6 hours PRN Temp greater or equal to 38C (100.4F), Mild Pain (1 - 3)    On Beta Blocker? NO / CONTRAINDICATED Reason__bradycardia____  On Aspirin 81mg? YES     Labs:                        13.4   6.92  )-----------( 195      ( 09 Oct 2022 05:30 )             39.9     10-    141  |  107  |  11  ----------------------------<  87  3.9   |  22  |  1.09    Ca    9.1      09 Oct 2022 05:30  Phos  3.5     10-08  Mg     1.9     10-    TPro  6.4  /  Alb  3.7  /  TBili  1.1  /  DBili  x   /  AST  18  /  ALT  12  /  AlkPhos  76  10-08    PT/INR - ( 08 Oct 2022 05:30 )   PT: 12.4 sec;   INR: 1.04       PTT - ( 08 Oct 2022 05:30 )  PTT:36.0 sec  Urinalysis Basic - ( 08 Oct 2022 04:34 )    Color: Yellow / Appearance: Clear / S.020 / pH: x  Gluc: x / Ketone: NEGATIVE  / Bili: Negative / Urobili: 0.2 E.U./dL   Blood: x / Protein: NEGATIVE mg/dL / Nitrite: NEGATIVE   Leuk Esterase: NEGATIVE / RBC: < 5 /HPF / WBC < 5 /HPF   Sq Epi: x / Non Sq Epi: 0-5 /HPF / Bacteria: Present /HPF    ABO Interpretation: O (10-08-22 @ 07:07)    CARDIAC MARKERS ( 08 Oct 2022 20:27 )  x     / 0.32 ng/mL / x     / x     / x      CARDIAC MARKERS ( 08 Oct 2022 05:30 )  x     / 0.34 ng/mL / x     / x     / x        Preoperative Checklist: (x = completed, n/a = not applicable, p = pending)  X___ECHO  X____PA/Lateral CXR  X____Carotid Duplex  P___CT imaging  X____PFTs  X____UA  X____Baseline Labs (CMP, CBC w/ diff, PTT, PT/INR)  X___A1c  X___TSH  X___Lipid panel  X____Cardiac enzymes  X____Pro BNP  X___EKG   X____T&S 1  P____T&S 2 (within 72 hours)  P____COVID PCR (within 72 hours)  P____Room air ABG  X____P2Y12  N/A____bHCG  ______Consents  ______Pre-op Orders Placed  ______Blood Products Ordered  ______NPO at midnight  ______Conduit tested    Abnormal/Noteworthy Preoperative Testing Results:   < from: TTE Echo Complete w/ Contrast w/ Doppler (10.08.22 @ 08:07) >  CONCLUSIONS:     1. Multiple segmental abnormalities exist. See findings.   2. Dilated left ventriclular size.   3. Severely reduced left ventricular systolic function.   4. Regional wall motion abnormalities consistent with ischemic heart   disease.   5. Grade I left ventricular diastolic dysfunction.   6. Normal right ventricular size and systolic function.   7. Dilated left atrium.   8. Mild-to-moderate mitral regurgitation.   9. Pulmonary artery systolic pressure is 27 mmHg.  10. No pericardial effusion.  11. No prior echo is available for comparison.    < end of copied text >

## 2022-10-09 NOTE — CONSULT NOTE ADULT - SUBJECTIVE AND OBJECTIVE BOX
**STROKE CODE CONSULT NOTE**    Last known well time: 17:10    HPI:Patient is a 72-year-old male with nicotine dependence who has not followed with physicians regularly, presented to the emergency department with complaint of substernal chest pain radiating to bilateral  arms. Patient reports he had initial episode on 10/3 upon awakening. It lasted approximately 15 minutes patient took aspirin at home and resolved. Patient had another episode of chest pain   radiating to bilateral arms this morning lasting approximately 15 minutes again took a dose of aspirin. SImilar event reoccurred the next afternoon and he decided to present to the emergency department for further evaluation.   In the ER patient was found to have inverted T waves in the lateral leads and markedly elevated troponin. Patient was chest pain-free upon arrival in the ER. Emergency department spoke   with interventional cardiology who recommend loading the patient with Plavix and starting him on heparin drip for planned cardiac catheterization. Cardiac cath showed (90% mLAD, 95% pLCX, 75% mRCA). He was planned to undergo bypass this week,  Stroke code was called on 10/9 after patient was found on the floor with a large right forehead laceration, aphasic, and with right sided weakness. He was brought to [facility] which was negative for hemorrhage but demonstrate hyperdensity within the proximal L MCA consistent with likely MCA thrombus. CTP significant for 300cc mismatch in L MCA territory. CTA head and neck performed, pending official report, however, unable to visualize L MCA.     T(C): 37.1 (10-09-22 @ 18:04), Max: 37.2 (10-08-22 @ 21:10)  HR: 52 (10-09-22 @ 18:15) (46 - 60)  BP: 128/75 (10-09-22 @ 17:40) (101/55 - 152/67)  RR: 18 (10-09-22 @ 18:15) (14 - 18)  SpO2: 100% (10-09-22 @ 18:15) (91% - 100%)    PAST MEDICAL & SURGICAL HISTORY:  H/O inguinal hernia repair          FAMILY HISTORY:    ROS:   Unable to obtain 2/2 aphasia    MEDICATIONS  (STANDING):  aspirin  chewable 81 milliGRAM(s) Oral daily  atorvastatin 40 milliGRAM(s) Oral at bedtime  heparin  Infusion 850 Unit(s)/Hr (8.5 mL/Hr) IV Continuous <Continuous>  influenza  Vaccine (HIGH DOSE) 0.7 milliLiter(s) IntraMuscular once  pantoprazole    Tablet 40 milliGRAM(s) Oral before breakfast  phenylephrine    Infusion 0.1 MICROgram(s)/kG/Min (2.68 mL/Hr) IV Continuous <Continuous>  senna 2 Tablet(s) Oral at bedtime  sodium chloride 0.9% lock flush 3 milliLiter(s) IV Push every 8 hours    MEDICATIONS  (PRN):  acetaminophen     Tablet .. 650 milliGRAM(s) Oral every 6 hours PRN Temp greater or equal to 38C (100.4F), Mild Pain (1 - 3)    Allergies    No Known Allergies    Intolerances      Vital Signs Last 24 Hrs  T(C): 37.1 (09 Oct 2022 18:04), Max: 37.2 (08 Oct 2022 21:10)  T(F): 98.8 (09 Oct 2022 18:04), Max: 98.9 (08 Oct 2022 21:10)  HR: 52 (09 Oct 2022 18:15) (46 - 60)  BP: 128/75 (09 Oct 2022 17:40) (101/55 - 152/67)  BP(mean): 90 (09 Oct 2022 17:40) (73 - 94)  RR: 18 (09 Oct 2022 18:15) (14 - 18)  SpO2: 100% (09 Oct 2022 18:15) (91% - 100%)    Parameters below as of 09 Oct 2022 18:15  Patient On (Oxygen Delivery Method): room air      Neurologic:  -Mental status: Awake, alert, globally aphasic, not following commands.   -Cranial nerves:   II: Visual fields are full to confrontation.  III, IV, VI: Left gaze preference, able to reach midline. Pupils equally round and reactive to light  VII: R facial droop  Motor: Right upper extremity 0/5, right lower extremity 2/5. Left upper extremity 4/5, left lower extremity at least 3/5.   Sensation: Grossly intact, grimaces to noxious stimuli applied to right side.    NIHSS: 21    Fingerstick Blood Glucose: CAPILLARY BLOOD GLUCOSE        LABS:                        13.4   6.92  )-----------( 195      ( 09 Oct 2022 05:30 )             39.9     10-    141  |  107  |  11  ----------------------------<  87  3.9   |  22  |  1.09    Ca    9.1      09 Oct 2022 05:30  Phos  3.5     10-  Mg     1.9     10    TPro  6.4  /  Alb  3.7  /  TBili  1.1  /  DBili  x   /  AST  18  /  ALT  12  /  AlkPhos  76  10-08    PT/INR - ( 08 Oct 2022 05:30 )   PT: 12.4 sec;   INR: 1.04          PTT - ( 09 Oct 2022 16:15 )  PTT:32.6 sec  CARDIAC MARKERS ( 08 Oct 2022 20:27 )  x     / 0.32 ng/mL / x     / x     / x      CARDIAC MARKERS ( 08 Oct 2022 05:30 )  x     / 0.34 ng/mL / x     / x     / x          Urinalysis Basic - ( 08 Oct 2022 04:34 )    Color: Yellow / Appearance: Clear / S.020 / pH: x  Gluc: x / Ketone: NEGATIVE  / Bili: Negative / Urobili: 0.2 E.U./dL   Blood: x / Protein: NEGATIVE mg/dL / Nitrite: NEGATIVE   Leuk Esterase: NEGATIVE / RBC: < 5 /HPF / WBC < 5 /HPF   Sq Epi: x / Non Sq Epi: 0-5 /HPF / Bacteria: Present /HPF        RADIOLOGY & ADDITIONAL STUDIES:  < from: CT Brain Stroke Protocol (10.09.22 @ 17:59) >  IMPRESSION: Hyperdensity within the proximal left MCA consistent with   likely MCA thrombus, no acute intracranial hemorrhage.    < end of copied text >    < from: CT Perfusion w/ Maps w/ IV Cont (10.09.22 @ 18:00) >  The CT perfusion study demonstrates a large near hemispheric   area of elevated MTT involving the left frontal, temporal and parietal   lobe without corresponding decrease in both CBV or CBF. The calculated   RAPID CBF volume < 30% is 0 ml and a Tmax volume > 6 seconds is 300 ml   meaning the mismatch volume is 300 ml and mismatch ratio is infinite.    < end of copied text >      -----------------------------------------------------------------------------------------------------------------  IV-tPA (Y/N):  N                           Reason IV-tPA not given: head trauma    **STROKE CODE CONSULT NOTE**    Last known well time: 17:10    HPI:Patient is a 72-year-old male with nicotine dependence who has not followed with physicians regularly, presented to the emergency department with complaint of substernal chest pain radiating to bilateral  arms. Patient reports he had initial episode on 10/3 upon awakening. It lasted approximately 15 minutes patient took aspirin at home and resolved. Patient had another episode of chest pain   radiating to bilateral arms this morning lasting approximately 15 minutes again took a dose of aspirin. SImilar event reoccurred the next afternoon and he decided to present to the emergency department for further evaluation.   In the ER patient was found to have inverted T waves in the lateral leads and markedly elevated troponin. Patient was chest pain-free upon arrival in the ER. Emergency department spoke   with interventional cardiology who recommend loading the patient with Plavix and starting him on heparin drip for planned cardiac catheterization. Cardiac cath showed (90% mLAD, 95% pLCX, 75% mRCA). He was planned to undergo bypass this week.   Stroke code was called on 10/9 after patient was found on the floor with a large right forehead laceration, aphasic, and with right sided weakness. He was brought to [facility] which was negative for hemorrhage but demonstrated a hyperdensity within the proximal L MCA consistent with likely MCA thrombus. CTP significant for 300cc mismatch in L MCA territory. CTA head and neck performed with L MCA occlusion at the origin with absence of flow also noted involving the proximal portion of the left A1 segment.   T(C): 37.1 (10-09-22 @ 18:04), Max: 37.2 (10-08-22 @ 21:10)  HR: 52 (10-09-22 @ 18:15) (46 - 60)  BP: 128/75 (10-09-22 @ 17:40) (101/55 - 152/67)  RR: 18 (10-09-22 @ 18:15) (14 - 18)  SpO2: 100% (10-09-22 @ 18:15) (91% - 100%)    PAST MEDICAL & SURGICAL HISTORY:  H/O inguinal hernia repair          FAMILY HISTORY:    ROS:   Unable to obtain 2/2 aphasia    MEDICATIONS  (STANDING):  aspirin  chewable 81 milliGRAM(s) Oral daily  atorvastatin 40 milliGRAM(s) Oral at bedtime  heparin  Infusion 850 Unit(s)/Hr (8.5 mL/Hr) IV Continuous <Continuous>  influenza  Vaccine (HIGH DOSE) 0.7 milliLiter(s) IntraMuscular once  pantoprazole    Tablet 40 milliGRAM(s) Oral before breakfast  phenylephrine    Infusion 0.1 MICROgram(s)/kG/Min (2.68 mL/Hr) IV Continuous <Continuous>  senna 2 Tablet(s) Oral at bedtime  sodium chloride 0.9% lock flush 3 milliLiter(s) IV Push every 8 hours    MEDICATIONS  (PRN):  acetaminophen     Tablet .. 650 milliGRAM(s) Oral every 6 hours PRN Temp greater or equal to 38C (100.4F), Mild Pain (1 - 3)    Allergies    No Known Allergies    Intolerances      Vital Signs Last 24 Hrs  T(C): 37.1 (09 Oct 2022 18:04), Max: 37.2 (08 Oct 2022 21:10)  T(F): 98.8 (09 Oct 2022 18:04), Max: 98.9 (08 Oct 2022 21:10)  HR: 52 (09 Oct 2022 18:15) (46 - 60)  BP: 128/75 (09 Oct 2022 17:40) (101/55 - 152/67)  BP(mean): 90 (09 Oct 2022 17:40) (73 - 94)  RR: 18 (09 Oct 2022 18:15) (14 - 18)  SpO2: 100% (09 Oct 2022 18:15) (91% - 100%)    Parameters below as of 09 Oct 2022 18:15  Patient On (Oxygen Delivery Method): room air      Neurologic:  -Mental status: Awake, alert, globally aphasic, not following commands.   -Cranial nerves:   II: Visual fields are full to confrontation.  III, IV, VI: Left gaze preference, able to reach midline. Pupils equally round and reactive to light  VII: R facial droop  Motor: Right upper extremity 0/5, right lower extremity 2/5. Left upper extremity 4/5, left lower extremity at least 3/5.   Sensation: Grossly intact, grimaces to noxious stimuli applied to right side.    NIHSS: 21    Fingerstick Blood Glucose: CAPILLARY BLOOD GLUCOSE        LABS:                        13.4   6.92  )-----------( 195      ( 09 Oct 2022 05:30 )             39.9     10-    141  |  107  |  11  ----------------------------<  87  3.9   |  22  |  1.09    Ca    9.1      09 Oct 2022 05:30  Phos  3.5     10  Mg     1.9     10-09    TPro  6.4  /  Alb  3.7  /  TBili  1.1  /  DBili  x   /  AST  18  /  ALT  12  /  AlkPhos  76  10-08    PT/INR - ( 08 Oct 2022 05:30 )   PT: 12.4 sec;   INR: 1.04          PTT - ( 09 Oct 2022 16:15 )  PTT:32.6 sec  CARDIAC MARKERS ( 08 Oct 2022 20:27 )  x     / 0.32 ng/mL / x     / x     / x      CARDIAC MARKERS ( 08 Oct 2022 05:30 )  x     / 0.34 ng/mL / x     / x     / x          Urinalysis Basic - ( 08 Oct 2022 04:34 )    Color: Yellow / Appearance: Clear / S.020 / pH: x  Gluc: x / Ketone: NEGATIVE  / Bili: Negative / Urobili: 0.2 E.U./dL   Blood: x / Protein: NEGATIVE mg/dL / Nitrite: NEGATIVE   Leuk Esterase: NEGATIVE / RBC: < 5 /HPF / WBC < 5 /HPF   Sq Epi: x / Non Sq Epi: 0-5 /HPF / Bacteria: Present /HPF        RADIOLOGY & ADDITIONAL STUDIES:  < from: CT Brain Stroke Protocol (10.09.22 @ 17:59) >  IMPRESSION: Hyperdensity within the proximal left MCA consistent with   likely MCA thrombus, no acute intracranial hemorrhage.    < end of copied text >    < from: CT Perfusion w/ Maps w/ IV Cont (10.09.22 @ 18:00) >  The CT perfusion study demonstrates a large near hemispheric   area of elevated MTT involving the left frontal, temporal and parietal   lobe without corresponding decrease in both CBV or CBF. The calculated   RAPID CBF volume < 30% is 0 ml and a Tmax volume > 6 seconds is 300 ml   meaning the mismatch volume is 300 ml and mismatch ratio is infinite.    < end of copied text >      -----------------------------------------------------------------------------------------------------------------  IV-tPA (Y/N):  N                           Reason IV-tPA not given: head trauma

## 2022-10-09 NOTE — PROGRESS NOTE ADULT - SUBJECTIVE AND OBJECTIVE BOX
CTICU  CRITICAL  CARE  attending     Hand off received 					   Pertinent clinical, laboratory, radiographic, hemodynamic, echocardiographic, respiratory data, microbiologic data and chart were reviewed and analyzed frequently throughout the course of the day and night      72 years old male nicotine dependence who has not followed with physicians regularly  He presented to the emergency department with complaint of substernal chest pain radiating to bilateral arms.  Patient reports he had initial episode on 10/3 upon awakening. It lasted approximately 15 minutes patient took aspirin at home and resolved. Patient had another episode of chest pain   radiating to bilateral arms this morning lasting approximately 15 minutes again took a dose of aspirin.   He presented to the emergency department for further evaluation.   In the ER patient was found to have inverted T waves in the lateral leads and markedly elevated troponin. Patient was chest pain-free upon arrival in the ER.   Emergency department spoke with interventional cardiology who recommend loading the patient with Plavix starting on heparin drip for planned cardiac catheterization.     Cardiac cath showed (90% mLAD, 95% pLCX, 75% mRCA).   He was waiting for CABG.  He fell on the floor after a syncopal episode.    FAMILY HISTORY:  PAST MEDICAL & SURGICAL HISTORY:  H/O inguinal hernia repair      14 system review was unremarkable      Vital signs, hemodynamic and respiratory parameters were reviewed from the bedside nursing flow sheet.  ICU Vital Signs Last 24 Hrs  T(C): 37.1 (09 Oct 2022 18:04), Max: 37.2 (08 Oct 2022 21:10)  T(F): 98.8 (09 Oct 2022 18:04), Max: 98.9 (08 Oct 2022 21:10)  HR: 54 (09 Oct 2022 17:40) (46 - 60)  BP: 128/75 (09 Oct 2022 17:40) (101/55 - 152/67)  BP(mean): 90 (09 Oct 2022 17:40) (73 - 94)  ABP: --  ABP(mean): --  RR: 15 (09 Oct 2022 04:55) (14 - 16)  SpO2: 91% (09 Oct 2022 17:40) (91% - 98%)    O2 Parameters below as of 09 Oct 2022 17:40  Patient On (Oxygen Delivery Method): room air          Adult Advanced Hemodynamics Last 24 Hrs  CVP(mm Hg): --  CVP(cm H2O): --  CO: --  CI: --  PA: --  PA(mean): --  PCWP: --  SVR: --  SVRI: --  PVR: --  PVRI: --, ABG - ( 09 Oct 2022 15:09 )  pH, Arterial: 7.50  pH, Blood: x     /  pCO2: 32    /  pO2: 85    / HCO3: 25    / Base Excess: 2.4   /  SaO2: 98.6                Intake and output was reviewed and the fluid balance was calculated  Daily     Daily   I&O's Summary    08 Oct 2022 07:01  -  09 Oct 2022 07:00  --------------------------------------------------------  IN: 0 mL / OUT: 300 mL / NET: -300 mL        All lines and drain sites were assessed    Neuro: Pupils 2-3 mm (reactive). Left gaze preference. Weakness of RUE and RLE. Moves left side well.  Not able to lift right upper and lower extremities off gravity although moves side to side. + Gag and corneal reflex.  Neck: No JVD.  CVS: S1, S2, No S3.  Lungs: Good air entry bilaterally.   Abd: Soft. No tenderness. + Bowel sounds.  Vascular: + DP/PT.  Extremities: No edema.  Lymphatic: Normal.  Skin: Skin laceration with small hematoma in the right upper corner of the forehead.       labs  CBC Full  -  ( 09 Oct 2022 05:30 )  WBC Count : 6.92 K/uL  RBC Count : 4.22 M/uL  Hemoglobin : 13.4 g/dL  Hematocrit : 39.9 %  Platelet Count - Automated : 195 K/uL  Mean Cell Volume : 94.5 fl  Mean Cell Hemoglobin : 31.8 pg  Mean Cell Hemoglobin Concentration : 33.6 gm/dL  Auto Neutrophil # : x  Auto Lymphocyte # : x  Auto Monocyte # : x  Auto Eosinophil # : x  Auto Basophil # : x  Auto Neutrophil % : x  Auto Lymphocyte % : x  Auto Monocyte % : x  Auto Eosinophil % : x  Auto Basophil % : x    10-09    141  |  107  |  11  ----------------------------<  87  3.9   |  22  |  1.09    Ca    9.1      09 Oct 2022 05:30  Phos  3.5     10-08  Mg     1.9     10-09    TPro  6.4  /  Alb  3.7  /  TBili  1.1  /  DBili  x   /  AST  18  /  ALT  12  /  AlkPhos  76  10-08    PT/INR - ( 08 Oct 2022 05:30 )   PT: 12.4 sec;   INR: 1.04          PTT - ( 09 Oct 2022 16:15 )  PTT:32.6 sec  The current medications were reviewed   MEDICATIONS  (STANDING):  aspirin  chewable 81 milliGRAM(s) Oral daily  atorvastatin 40 milliGRAM(s) Oral at bedtime  heparin  Infusion 850 Unit(s)/Hr (8.5 mL/Hr) IV Continuous <Continuous>  influenza  Vaccine (HIGH DOSE) 0.7 milliLiter(s) IntraMuscular once  pantoprazole    Tablet 40 milliGRAM(s) Oral before breakfast  phenylephrine    Infusion 0.1 MICROgram(s)/kG/Min (2.68 mL/Hr) IV Continuous <Continuous>  senna 2 Tablet(s) Oral at bedtime  sodium chloride 0.9% lock flush 3 milliLiter(s) IV Push every 8 hours    MEDICATIONS  (PRN):  acetaminophen     Tablet .. 650 milliGRAM(s) Oral every 6 hours PRN Temp greater or equal to 38C (100.4F), Mild Pain (1 - 3)      72 years old  Male admitted with Coronary Artery Disease.  He a fall causing a laceration on the right side of the forehead with neurological changes.  CT Head: Proxima  left Middle Cerebral Artery thrombus. No acute intracranial hemorrhage.  CT perfusion study demonstrates a large near hemispheric area of elevated MTT involving the left frontal, temporal and parietal lobe without corresponding decrease in both CBV or CBF.   The calculated RAPID CBF volume < 30% is 0 ml and a Tmax volume > 6 seconds is 300 ml meaning the mismatch volume is 300 ml and mismatch ratio is infinite.  Case discussed with Neurology and Neuro Surgery team. Thrombectomy is the best option for him right now.  Hemodynamically stable.  Good oxygenation.  Fair urine out put.        My plan includes :  [facility] thrombectomy by Neurosurgery team.  Keep MAP > 90.  High dose Statin Rx.  Close hemodynamic, ventilatory and drain monitoring and management  Monitor for arrhythmias and monitor parameters for organ perfusion  Monitor neurologic status  Monitor renal function.  Head of the bed should remain elevated to 45 deg .   Chest PT and IS will be encouraged  Monitor adequacy of oxygenation and ventilation and attempt to wean oxygen  Nutritional goals will be met using po eventually , ensure adequate caloric intake and monitor the same  Stress ulcer and VTE prophylaxis will be achieved    Glycemic control is satisfactory  Electrolytes have been repleted as necessary and wound care has been carried out. Pain control has been achieved.   Aggressive physical therapy and early mobility and ambulation goals will be met   The family was updated about the course and plan  CRITICAL CARE TIME SPENT in evaluation and management, reassessments, review and interpretation of labs and x-rays, ventilator and hemodynamic management, formulating a plan and coordinating care: ___90____ MIN.  Time does not include procedural time.  CTICU ATTENDING     					    Nik Shaw MD

## 2022-10-09 NOTE — PRE-ANESTHESIA EVALUATION ADULT - NSATTENDATTESTRD_GEN_ALL_CORE
The patient has been re-examined and I agree with the above assessment or I updated with my findings. 1

## 2022-10-09 NOTE — PRE-ANESTHESIA EVALUATION ADULT - NSANTHINDVINFOSD_GEN_ALL_CORE
I had a detailed discussion with patient and family outlining risks of brain death, hear attack, stroke, death; risks vs benefits understood and all parties including interventionalist wish to proceed with life saving intervention/patient/relative

## 2022-10-09 NOTE — CHART NOTE - NSCHARTNOTEFT_GEN_A_CORE
Patient seen post-thrombectomy in CTICU by stroke team. NIHSS Patient seen post-thrombectomy in CTICU by stroke team. Post-thrombectomy NIHSS 6.    Neurologic:  -Mental status: Was sleeping on arrival, opens eyes to voice, alert, oriented to person, place, and year, not to month (July). Mixed aphasia, follows simple commands, has difficulty with 2 step commands, able to say one to two worded responses, mild dysarthria. Attention/concentration intact.   -Cranial nerves:   II: Visual fields are full to confrontation.  III, IV, VI: Extraocular movements are intact without nystagmus. Pupils equally round and reactive to light  V:  Facial sensation V1-V3 equal and intact   VII: Mild R NLFF  VIII: Hearing is grossly intact   XII: Tongue protrudes midline  Motor: Normal bulk and tone. No pronator drift. Strength bilateral upper extremity 5/5, bilateral lower extremities 5/5.  Sensation: Intact to light touch bilaterally. + right neglect on DST  Coordination: No dysmetria on finger-to-nose and heel-to-shin bilaterally  Gait: deferred as pt is s/p thrombectomy     Plan:   - continue neuro/vascular checks per protocol  - repeat CT head with any acute change in mental status.  - BP goal per NSGY  - please obtain A1C, LDL, TSH  - please obtain TTE with bubble study  - recommend SLP evaluation for mixed aphasia and mild dysarthria  - Swallow evaluation if fails dysphagia screen  - PT/OT order  - Stroke education  - remainder of care per primary team Patient seen post-thrombectomy in CTICU by stroke team. Post-thrombectomy NIHSS 6.    Neurologic:  -Mental status: Was sleeping on arrival, opens eyes to voice, alert, oriented to person, place, and year, not to month (July). Mixed aphasia, follows simple commands, has difficulty with 2 step commands, able to say one to two worded responses, mild dysarthria. Attention/concentration intact.   -Cranial nerves:   II: Visual fields are full to confrontation.  III, IV, VI: Extraocular movements are intact without nystagmus. Pupils equally round and reactive to light  V:  Facial sensation V1-V3 equal and intact   VII: Mild R NLFF  VIII: Hearing is grossly intact   XII: Tongue protrudes midline  Motor: Normal bulk and tone. No pronator drift. RUE strength 4+/5 proximally, LUE 5/5, b/l hand  5/5, LLE 5/5, RLE unable to assess as pt is post procedure.  Sensation: Intact to light touch bilaterally. + right neglect on DST  Coordination: No dysmetria on finger-to-nose and heel-to-shin bilaterally  Gait: deferred as pt is s/p thrombectomy     Plan:   - continue neuro/vascular checks per protocol  - repeat CT head with any acute change in mental status.  - BP goal per NSGY  - please obtain A1C, LDL, TSH  - please obtain TTE with bubble study  - recommend SLP evaluation for mixed aphasia and mild dysarthria  - Swallow evaluation if fails dysphagia screen  - PT/OT order  - Stroke education  - remainder of care per primary team

## 2022-10-10 LAB
ANION GAP SERPL CALC-SCNC: 11 MMOL/L — SIGNIFICANT CHANGE UP (ref 5–17)
ANION GAP SERPL CALC-SCNC: 14 MMOL/L — SIGNIFICANT CHANGE UP (ref 5–17)
ANION GAP SERPL CALC-SCNC: 9 MMOL/L — SIGNIFICANT CHANGE UP (ref 5–17)
APTT BLD: 39 SEC — HIGH (ref 27.5–35.5)
APTT BLD: 50.5 SEC — HIGH (ref 27.5–35.5)
BUN SERPL-MCNC: 10 MG/DL — SIGNIFICANT CHANGE UP (ref 7–23)
BUN SERPL-MCNC: 9 MG/DL — SIGNIFICANT CHANGE UP (ref 7–23)
BUN SERPL-MCNC: 9 MG/DL — SIGNIFICANT CHANGE UP (ref 7–23)
CALCIUM SERPL-MCNC: 8.4 MG/DL — SIGNIFICANT CHANGE UP (ref 8.4–10.5)
CALCIUM SERPL-MCNC: 9 MG/DL — SIGNIFICANT CHANGE UP (ref 8.4–10.5)
CALCIUM SERPL-MCNC: 9 MG/DL — SIGNIFICANT CHANGE UP (ref 8.4–10.5)
CHLORIDE SERPL-SCNC: 103 MMOL/L — SIGNIFICANT CHANGE UP (ref 96–108)
CHLORIDE SERPL-SCNC: 105 MMOL/L — SIGNIFICANT CHANGE UP (ref 96–108)
CHLORIDE SERPL-SCNC: 106 MMOL/L — SIGNIFICANT CHANGE UP (ref 96–108)
CO2 SERPL-SCNC: 21 MMOL/L — LOW (ref 22–31)
CO2 SERPL-SCNC: 24 MMOL/L — SIGNIFICANT CHANGE UP (ref 22–31)
CO2 SERPL-SCNC: 25 MMOL/L — SIGNIFICANT CHANGE UP (ref 22–31)
CREAT SERPL-MCNC: 0.94 MG/DL — SIGNIFICANT CHANGE UP (ref 0.5–1.3)
CREAT SERPL-MCNC: 1.07 MG/DL — SIGNIFICANT CHANGE UP (ref 0.5–1.3)
CREAT SERPL-MCNC: 1.2 MG/DL — SIGNIFICANT CHANGE UP (ref 0.5–1.3)
EGFR: 65 ML/MIN/1.73M2 — SIGNIFICANT CHANGE UP
EGFR: 74 ML/MIN/1.73M2 — SIGNIFICANT CHANGE UP
EGFR: 87 ML/MIN/1.73M2 — SIGNIFICANT CHANGE UP
GAS PNL BLDA: SIGNIFICANT CHANGE UP
GLUCOSE SERPL-MCNC: 100 MG/DL — HIGH (ref 70–99)
GLUCOSE SERPL-MCNC: 114 MG/DL — HIGH (ref 70–99)
GLUCOSE SERPL-MCNC: 117 MG/DL — HIGH (ref 70–99)
HCT VFR BLD CALC: 35.7 % — LOW (ref 39–50)
HCT VFR BLD CALC: 39.5 % — SIGNIFICANT CHANGE UP (ref 39–50)
HCT VFR BLD CALC: 42.5 % — SIGNIFICANT CHANGE UP (ref 39–50)
HGB BLD-MCNC: 12.1 G/DL — LOW (ref 13–17)
HGB BLD-MCNC: 13.6 G/DL — SIGNIFICANT CHANGE UP (ref 13–17)
HGB BLD-MCNC: 13.9 G/DL — SIGNIFICANT CHANGE UP (ref 13–17)
INR BLD: 1.07 — SIGNIFICANT CHANGE UP (ref 0.88–1.16)
INR BLD: 1.09 — SIGNIFICANT CHANGE UP (ref 0.88–1.16)
MAGNESIUM SERPL-MCNC: 2 MG/DL — SIGNIFICANT CHANGE UP (ref 1.6–2.6)
MAGNESIUM SERPL-MCNC: 2 MG/DL — SIGNIFICANT CHANGE UP (ref 1.6–2.6)
MAGNESIUM SERPL-MCNC: 2.3 MG/DL — SIGNIFICANT CHANGE UP (ref 1.6–2.6)
MCHC RBC-ENTMCNC: 31.7 PG — SIGNIFICANT CHANGE UP (ref 27–34)
MCHC RBC-ENTMCNC: 32.1 PG — SIGNIFICANT CHANGE UP (ref 27–34)
MCHC RBC-ENTMCNC: 32.5 PG — SIGNIFICANT CHANGE UP (ref 27–34)
MCHC RBC-ENTMCNC: 32.7 GM/DL — SIGNIFICANT CHANGE UP (ref 32–36)
MCHC RBC-ENTMCNC: 33.9 GM/DL — SIGNIFICANT CHANGE UP (ref 32–36)
MCHC RBC-ENTMCNC: 34.4 GM/DL — SIGNIFICANT CHANGE UP (ref 32–36)
MCV RBC AUTO: 94.3 FL — SIGNIFICANT CHANGE UP (ref 80–100)
MCV RBC AUTO: 94.7 FL — SIGNIFICANT CHANGE UP (ref 80–100)
MCV RBC AUTO: 96.8 FL — SIGNIFICANT CHANGE UP (ref 80–100)
NRBC # BLD: 0 /100 WBCS — SIGNIFICANT CHANGE UP (ref 0–0)
PHOSPHATE SERPL-MCNC: 2.8 MG/DL — SIGNIFICANT CHANGE UP (ref 2.5–4.5)
PHOSPHATE SERPL-MCNC: 3.6 MG/DL — SIGNIFICANT CHANGE UP (ref 2.5–4.5)
PLATELET # BLD AUTO: 180 K/UL — SIGNIFICANT CHANGE UP (ref 150–400)
PLATELET # BLD AUTO: 218 K/UL — SIGNIFICANT CHANGE UP (ref 150–400)
PLATELET # BLD AUTO: 226 K/UL — SIGNIFICANT CHANGE UP (ref 150–400)
POTASSIUM SERPL-MCNC: 4 MMOL/L — SIGNIFICANT CHANGE UP (ref 3.5–5.3)
POTASSIUM SERPL-MCNC: 4.2 MMOL/L — SIGNIFICANT CHANGE UP (ref 3.5–5.3)
POTASSIUM SERPL-MCNC: 5 MMOL/L — SIGNIFICANT CHANGE UP (ref 3.5–5.3)
POTASSIUM SERPL-SCNC: 4 MMOL/L — SIGNIFICANT CHANGE UP (ref 3.5–5.3)
POTASSIUM SERPL-SCNC: 4.2 MMOL/L — SIGNIFICANT CHANGE UP (ref 3.5–5.3)
POTASSIUM SERPL-SCNC: 5 MMOL/L — SIGNIFICANT CHANGE UP (ref 3.5–5.3)
PROTHROM AB SERPL-ACNC: 12.8 SEC — SIGNIFICANT CHANGE UP (ref 10.5–13.4)
PROTHROM AB SERPL-ACNC: 13 SEC — SIGNIFICANT CHANGE UP (ref 10.5–13.4)
RBC # BLD: 3.77 M/UL — LOW (ref 4.2–5.8)
RBC # BLD: 4.19 M/UL — LOW (ref 4.2–5.8)
RBC # BLD: 4.39 M/UL — SIGNIFICANT CHANGE UP (ref 4.2–5.8)
RBC # FLD: 12.8 % — SIGNIFICANT CHANGE UP (ref 10.3–14.5)
RBC # FLD: 12.9 % — SIGNIFICANT CHANGE UP (ref 10.3–14.5)
RBC # FLD: 13 % — SIGNIFICANT CHANGE UP (ref 10.3–14.5)
SODIUM SERPL-SCNC: 138 MMOL/L — SIGNIFICANT CHANGE UP (ref 135–145)
SODIUM SERPL-SCNC: 139 MMOL/L — SIGNIFICANT CHANGE UP (ref 135–145)
SODIUM SERPL-SCNC: 141 MMOL/L — SIGNIFICANT CHANGE UP (ref 135–145)
WBC # BLD: 10.6 K/UL — HIGH (ref 3.8–10.5)
WBC # BLD: 11.12 K/UL — HIGH (ref 3.8–10.5)
WBC # BLD: 9.77 K/UL — SIGNIFICANT CHANGE UP (ref 3.8–10.5)
WBC # FLD AUTO: 10.6 K/UL — HIGH (ref 3.8–10.5)
WBC # FLD AUTO: 11.12 K/UL — HIGH (ref 3.8–10.5)
WBC # FLD AUTO: 9.77 K/UL — SIGNIFICANT CHANGE UP (ref 3.8–10.5)

## 2022-10-10 PROCEDURE — 99221 1ST HOSP IP/OBS SF/LOW 40: CPT

## 2022-10-10 PROCEDURE — 93308 TTE F-UP OR LMTD: CPT | Mod: 26

## 2022-10-10 PROCEDURE — 71045 X-RAY EXAM CHEST 1 VIEW: CPT | Mod: 26

## 2022-10-10 PROCEDURE — 99024 POSTOP FOLLOW-UP VISIT: CPT

## 2022-10-10 PROCEDURE — 94010 BREATHING CAPACITY TEST: CPT | Mod: 26

## 2022-10-10 PROCEDURE — 99292 CRITICAL CARE ADDL 30 MIN: CPT

## 2022-10-10 PROCEDURE — 99291 CRITICAL CARE FIRST HOUR: CPT

## 2022-10-10 PROCEDURE — 93926 LOWER EXTREMITY STUDY: CPT | Mod: 26,RT

## 2022-10-10 RX ORDER — HEPARIN SODIUM 5000 [USP'U]/ML
1200 INJECTION INTRAVENOUS; SUBCUTANEOUS
Qty: 25000 | Refills: 0 | Status: DISCONTINUED | OUTPATIENT
Start: 2022-10-10 | End: 2022-10-10

## 2022-10-10 RX ORDER — ALBUMIN HUMAN 25 %
500 VIAL (ML) INTRAVENOUS ONCE
Refills: 0 | Status: COMPLETED | OUTPATIENT
Start: 2022-10-10 | End: 2022-10-10

## 2022-10-10 RX ORDER — DOBUTAMINE HCL 250MG/20ML
3 VIAL (ML) INTRAVENOUS
Qty: 500 | Refills: 0 | Status: DISCONTINUED | OUTPATIENT
Start: 2022-10-10 | End: 2022-10-12

## 2022-10-10 RX ORDER — CHLORHEXIDINE GLUCONATE 213 G/1000ML
1 SOLUTION TOPICAL
Refills: 0 | Status: DISCONTINUED | OUTPATIENT
Start: 2022-10-10 | End: 2022-10-14

## 2022-10-10 RX ORDER — PANTOPRAZOLE SODIUM 20 MG/1
40 TABLET, DELAYED RELEASE ORAL DAILY
Refills: 0 | Status: DISCONTINUED | OUTPATIENT
Start: 2022-10-10 | End: 2022-10-11

## 2022-10-10 RX ORDER — HEPARIN SODIUM 5000 [USP'U]/ML
5000 INJECTION INTRAVENOUS; SUBCUTANEOUS EVERY 8 HOURS
Refills: 0 | Status: DISCONTINUED | OUTPATIENT
Start: 2022-10-10 | End: 2022-10-11

## 2022-10-10 RX ADMIN — SODIUM CHLORIDE 3 MILLILITER(S): 9 INJECTION INTRAMUSCULAR; INTRAVENOUS; SUBCUTANEOUS at 21:32

## 2022-10-10 RX ADMIN — PHENYLEPHRINE HYDROCHLORIDE 2.68 MICROGRAM(S)/KG/MIN: 10 INJECTION INTRAVENOUS at 17:31

## 2022-10-10 RX ADMIN — Medication 6.43 MICROGRAM(S)/KG/MIN: at 10:24

## 2022-10-10 RX ADMIN — SODIUM CHLORIDE 3 MILLILITER(S): 9 INJECTION INTRAMUSCULAR; INTRAVENOUS; SUBCUTANEOUS at 13:53

## 2022-10-10 RX ADMIN — Medication 250 MILLILITER(S): at 07:00

## 2022-10-10 RX ADMIN — PANTOPRAZOLE SODIUM 40 MILLIGRAM(S): 20 TABLET, DELAYED RELEASE ORAL at 13:47

## 2022-10-10 RX ADMIN — SODIUM CHLORIDE 3 MILLILITER(S): 9 INJECTION INTRAMUSCULAR; INTRAVENOUS; SUBCUTANEOUS at 06:07

## 2022-10-10 RX ADMIN — PANTOPRAZOLE SODIUM 40 MILLIGRAM(S): 20 TABLET, DELAYED RELEASE ORAL at 07:00

## 2022-10-10 RX ADMIN — ATORVASTATIN CALCIUM 40 MILLIGRAM(S): 80 TABLET, FILM COATED ORAL at 21:13

## 2022-10-10 RX ADMIN — HEPARIN SODIUM 5000 UNIT(S): 5000 INJECTION INTRAVENOUS; SUBCUTANEOUS at 21:13

## 2022-10-10 RX ADMIN — CHLORHEXIDINE GLUCONATE 1 APPLICATION(S): 213 SOLUTION TOPICAL at 16:00

## 2022-10-10 NOTE — OCCUPATIONAL THERAPY INITIAL EVALUATION ADULT - MODALITIES TREATMENT COMMENTS
Cranial Nerves II - XII: II: PERRLA; visual fields are full to confrontation III, IV, VI: EOMI, no nystagmus appreciated V: facial sensation intact to light touch V1-V3 b/l VII: no ptosis, no facial droop, symmetric eyebrow raise and closure VIII: hearing intact to finger rub b/l  XI: head turning intact, pt unable to comprehend shoulder shrug 2/2 aphasia XII: tongue protrusion midline. Vision H-test: WF:, Vision Quadrant Test: grossly assessed 2/2 decreased command following but pt demo 75% accuracy when cued on stating "one or two" in all planes

## 2022-10-10 NOTE — PROGRESS NOTE ADULT - SUBJECTIVE AND OBJECTIVE BOX
CTICU  CRITICAL  CARE  attending     Hand off received 					   Pertinent clinical, laboratory, radiographic, hemodynamic, echocardiographic, respiratory data, microbiologic data and chart were reviewed and analyzed frequently throughout the course of the day and night      72 years old male nicotine dependence who has not followed with physicians regularly  He presented to the emergency department with complaint of substernal chest pain radiating to bilateral arms.  Patient reports he had initial episode on 10/3 upon awakening. It lasted approximately 15 minutes patient took aspirin at home and resolved. Patient had another episode of chest pain   radiating to bilateral arms this morning lasting approximately 15 minutes again took a dose of aspirin.   He presented to the emergency department for further evaluation.   In the ER patient was found to have inverted T waves in the lateral leads and markedly elevated troponin. Patient was chest pain-free upon arrival in the ER.   Emergency department spoke with interventional cardiology who recommend loading the patient with Plavix starting on heparin drip for planned cardiac catheterization.     Cardiac cath showed (90% mLAD, 95% pLCX, 75% mRCA).   He was waiting for CABG.  He fell on the floor after a syncopal episode.    CT Head: Proxima  left Middle Cerebral Artery thrombus. No acute intracranial hemorrhage.  CT perfusion study demonstrates a large near hemispheric area of elevated MTT involving the left frontal, temporal and parietal lobe without corresponding decrease in both CBV or CBF.   The calculated RAPID CBF volume < 30% is 0 ml and a Tmax volume > 6 seconds is 300 ml meaning the mismatch volume is 300 ml and mismatch ratio is infinite.  He was emergently taken for cerebral angiogram and aspiration thrombectomy of the left MCA.        FAMILY HISTORY:  PAST MEDICAL & SURGICAL HISTORY:  H/O inguinal hernia repair      14 system review was unremarkable    Vital signs, hemodynamic and respiratory parameters were reviewed from the bedside nursing flow sheet.  ICU Vital Signs Last 24 Hrs  T(C): 37.3 (10 Oct 2022 21:17), Max: 37.6 (10 Oct 2022 14:41)  T(F): 99.2 (10 Oct 2022 21:17), Max: 99.6 (10 Oct 2022 14:41)  HR: 46 (10 Oct 2022 21:00) (43 - 76)  BP: 120/59 (10 Oct 2022 21:00) (94/51 - 137/69)  BP(mean): 85 (10 Oct 2022 21:00) (69 - 99)  ABP: 61/52 (10 Oct 2022 06:00) (61/52 - 136/73)  ABP(mean): 57 (10 Oct 2022 06:00) (57 - 113)  RR: 16 (10 Oct 2022 21:00) (14 - 18)  SpO2: 98% (10 Oct 2022 21:00) (97% - 100%)    O2 Parameters below as of 10 Oct 2022 22:00  Patient On (Oxygen Delivery Method): room air          Adult Advanced Hemodynamics Last 24 Hrs  CVP(mm Hg): --  CVP(cm H2O): --  CO: --  CI: --  PA: --  PA(mean): --  PCWP: --  SVR: --  SVRI: --  PVR: --  PVRI: --, ABG - ( 10 Oct 2022 02:12 )  pH, Arterial: 7.40  pH, Blood: x     /  pCO2: 34    /  pO2: 134   / HCO3: 21    / Base Excess: -3.0  /  SaO2: 99.5                Intake and output was reviewed and the fluid balance was calculated  Daily     Daily   I&O's Summary    09 Oct 2022 07:01  -  10 Oct 2022 07:00  --------------------------------------------------------  IN: 304.2 mL / OUT: 250 mL / NET: 54.2 mL    10 Oct 2022 07:01  -  10 Oct 2022 21:53  --------------------------------------------------------  IN: 1144 mL / OUT: 935 mL / NET: 209 mL        All lines and drain sites were assessed    Neuro: Mild aphasia and dysarthria. Power 4/5 on the right side. 5/5 on the left side.   Neck: No JVD.  CVS: S1, S2, No S3.  Lungs: Good air entry bilaterally.  Abd: Soft. No tenderness. + Bowel sounds.  Vascular: + DP/PT.  Extremities: No edema.  Lymphatic: Normal.  Skin: No abnormalities.      labs  CBC Full  -  ( 10 Oct 2022 21:28 )  WBC Count : 9.77 K/uL  RBC Count : 3.77 M/uL  Hemoglobin : 12.1 g/dL  Hematocrit : 35.7 %  Platelet Count - Automated : 180 K/uL  Mean Cell Volume : 94.7 fl  Mean Cell Hemoglobin : 32.1 pg  Mean Cell Hemoglobin Concentration : 33.9 gm/dL  Auto Neutrophil # : x  Auto Lymphocyte # : x  Auto Monocyte # : x  Auto Eosinophil # : x  Auto Basophil # : x  Auto Neutrophil % : x  Auto Lymphocyte % : x  Auto Monocyte % : x  Auto Eosinophil % : x  Auto Basophil % : x    10-10    139  |  105  |  9   ----------------------------<  114<H>  4.2   |  25  |  1.20    Ca    9.0      10 Oct 2022 21:28  Phos  2.8     10-10  Mg     2.0     10-10    TPro  6.9  /  Alb  3.8  /  TBili  1.0  /  DBili  x   /  AST  19  /  ALT  14  /  AlkPhos  82  10-09    PT/INR - ( 10 Oct 2022 08:42 )   PT: 13.0 sec;   INR: 1.09          PTT - ( 10 Oct 2022 08:42 )  PTT:50.5 sec  The current medications were reviewed   MEDICATIONS  (STANDING):  aspirin  chewable 81 milliGRAM(s) Oral daily  atorvastatin 40 milliGRAM(s) Oral at bedtime  chlorhexidine 2% Cloths 1 Application(s) Topical <User Schedule>  DOBUTamine Infusion 3 MICROgram(s)/kG/Min (6.43 mL/Hr) IV Continuous <Continuous>  heparin   Injectable 5000 Unit(s) SubCutaneous every 8 hours  influenza  Vaccine (HIGH DOSE) 0.7 milliLiter(s) IntraMuscular once  pantoprazole  Injectable 40 milliGRAM(s) IV Push daily  phenylephrine    Infusion 0.1 MICROgram(s)/kG/Min (2.68 mL/Hr) IV Continuous <Continuous>  senna 2 Tablet(s) Oral at bedtime  sodium chloride 0.9% lock flush 3 milliLiter(s) IV Push every 8 hours    MEDICATIONS  (PRN):  acetaminophen     Tablet .. 650 milliGRAM(s) Oral every 6 hours PRN Temp greater or equal to 38C (100.4F), Mild Pain (1 - 3)          72 years old  Male admitted with triple vessel Coronary Artery Disease.  Hospital course complicated by CVA caused by thrombosis of the left middle cerebral artery.  S/P Left middle cerebral artery thrombectomy.   Emergent femoral cerebral angiogram performed under MAC sedation via right CFA using an 8Fr Long sheath. Left ICA injection with evidence of Left M1 occlusion. Mechanical thrombectomy performed using aspiration, one pass, with recannulization of the left M1. TICI 0 to TICI 3.   Improved strength and coordination in the RULE and RLE.  Sinus bradycardia. (Now on IV dobutamine infusion).  Hemodynamically stable.  Good oxygenation.  Fair urine out put.  Passed dysphagia and speech & swallow today.         My plan includes :  Repeat CT head.   Statin and Betablocker.  ASA and plavix.   Close hemodynamic, ventilatory and drain monitoring and management  Monitor for arrhythmias and monitor parameters for organ perfusion  Monitor neurologic status  Monitor renal function.  Head of the bed should remain elevated to 45 deg .   Chest PT and IS will be encouraged  Monitor adequacy of oxygenation and ventilation and attempt to wean oxygen  Nutritional goals will be met using po eventually , ensure adequate caloric intake and monitor the same  Stress ulcer and VTE prophylaxis will be achieved    Glycemic control is satisfactory  Electrolytes have been repleted as necessary and wound care has been carried out. Pain control has been achieved.   Aggressive physical therapy and early mobility and ambulation goals will be met   The family was updated about the course and plan  CRITICAL CARE TIME SPENT in evaluation and management, reassessments, review and interpretation of labs and x-rays, ventilator and hemodynamic management, formulating a plan and coordinating care: ___30____ MIN.  Time does not include procedural time.  CTICU ATTENDING     					    Nik Shaw MD

## 2022-10-10 NOTE — SPEECH LANGUAGE PATHOLOGY EVALUATION - SLP AUTOMATIC SPEECH
Counting 1-5 with orthographic aid: 1/5 ("2" only) with recurrent perseverations of "2." Accuracy improved to 3/5 when productions were produced with unison intoning given articulatory placement cues/impaired/counting

## 2022-10-10 NOTE — SWALLOW BEDSIDE ASSESSMENT ADULT - NS SPL SWALLOW CLINIC TRIAL FT
Given a F:2 choices, pt consistently pointed to preferred food/drink item given tactile and verbal cues for initiation and scanning. Hand-over-hand assistance initially provided to aid in feeding. However, as trials progressed, pt became more independent. Adequate bolus containment. Disorganized bolus processing of liquids with excessive swishing of liquids (reduced as trials progressed). ?piecemeal deglutition of liquids with multiple swallows to clear liquid from oral cavity. Mild lingual residue from solids was cleared with a liquid wash. Laryngeal movement palpated; appeared brisk and timely. No clinical overt s/s of aspiration appreciated.

## 2022-10-10 NOTE — PROGRESS NOTE ADULT - SUBJECTIVE AND OBJECTIVE BOX
INTERVAL HPI/OVERNIGHT EVENTS:    10/9:  Emergent Angiogram - mechanical thrombectomy Left MCA   Multivessel CAD - EF 15%    71yo Male active tobacco use presenting with chest pain/unstable angina with pain radiating to arm - (+)NSTEMI    Cath: 90% mLAD, 95% pLCX, 75% mRCA    transferred from [facility] 10/7 for CTS evaluation and intervention   ECHO 10/8: Multiple segmental abnormalities. Dilated LV. Severely reduced LV systolic function.  Regional wall motion abnormalities. Grade I left ventricular diastolic dysfunction. Dilated LA, mild to mod MR  No pericardial effusion.  Carotid: no stenosis    planned OR 10/11    10/9: patient found down/unresponsive - deep but on forehead/aphasic with right sided plegia - code stroke called    CT Brain 10/9: Hyperdensity w/in the proximal left MCA consistent with likely MCA thrombus, no hemorrhage.  CTa : Left MCA is occluded at its origin with absence of flow also noted involving the proximal portion of the left A1 segment. No large vessel stenosis or occlusion detected involving the remaining major branches     Neuro/Neurosurgery consulted   to OR 10/9 - angiogram - mechanical thrombectomy Left MCA    Post procedure - rec BP goals per NS - -160 for perfusion     Some improvement in right sided paresis noted post-procedure     on Henry 1/heparin qtt     ICU Vital Signs Last 24 Hrs  T(C): 36.9 (10 Oct 2022 05:29), Max: 37.1 (09 Oct 2022 18:04)  T(F): 98.5 (10 Oct 2022 05:29), Max: 98.8 (09 Oct 2022 18:04)  HR: 47 (10 Oct 2022 06:00) (45 - 60) sinus flores   BP: 124/75 (10 Oct 2022 06:00) (120/68 - 152/67)  BP(mean): 93 (10 Oct 2022 06:00) (85 - 99)  ABP: 61/52 (10 Oct 2022 06:00) (61/52 - 154/79)  ABP(mean): 57 (10 Oct 2022 06:00) (57 - 113)  RR: 17 (10 Oct 2022 06:00) (14 - 20)  SpO2: 98% (10 Oct 2022 06:00) (91% - 100%) RA    Qtts:   Henry 1  Heparin 1200 U /Hour - inc to same at 06:30a     I&O's Summary    09 Oct 2022 07:01  -  10 Oct 2022 07:00  --------------------------------------------------------  IN: 265.4 mL / OUT: 250 mL / NET: 15.4 mL    Physical Exam    Heart  Lungs  Abd  Ext  Chest  Neuro  Skin    LABS:                        13.6   11.12 )-----------( 218      ( 10 Oct 2022 02:12 )             39.5     10-10    138  |  106  |  9   ----------------------------<  100<H>  4.0   |  21<L>  |  0.94    Ca    8.4      10 Oct 2022 02:12  Phos  3.1     10-09  Mg     2.0     10-10    TPro  6.9  /  Alb  3.8  /  TBili  1.0  /  DBili  x   /  AST  19  /  ALT  14  /  AlkPhos  82  10-09    PT/INR - ( 10 Oct 2022 02:12 )   PT: 12.8 sec;   INR: 1.07          PTT - ( 10 Oct 2022 02:12 )  PTT:39.0 sec    ABG - ( 10 Oct 2022 02:12 )  pH, Arterial: 7.40  pH, Blood: x     /  pCO2: 34    /  pO2: 134   / HCO3: 21    / Base Excess: -3.0  /  SaO2: 99.5                RADIOLOGY & ADDITIONAL STUDIES:    I have spent/provided stated minutes of critical care time to this patient:  INTERVAL HPI/OVERNIGHT EVENTS:    10/9:  Emergent Angiogram - mechanical thrombectomy Left MCA   Multivessel CAD - EF 15%    71yo Male active tobacco use presenting with chest pain/unstable angina with pain radiating to arm - (+)NSTEMI    Cath: 90% mLAD, 95% pLCX, 75% mRCA    transferred from [facility] 10/7 for CTS evaluation and intervention   ECHO 10/8: Multiple segmental abnormalities. Dilated LV. Severely reduced LV systolic function.  Regional wall motion abnormalities. Grade I left ventricular diastolic dysfunction. Dilated LA, mild to mod MR  No pericardial effusion.  Carotid: no stenosis    planned OR 10/11    10/9: patient found down/unresponsive - deep but on forehead/aphasic with right sided plegia - code stroke called    CT Brain 10/9: Hyperdensity w/in the proximal left MCA consistent with likely MCA thrombus, no hemorrhage.  CTa : Left MCA is occluded at its origin with absence of flow also noted involving the proximal portion of the left A1 segment. No large vessel stenosis or occlusion detected involving the remaining major branches     Neuro/Neurosurgery consulted   to OR 10/9 - angiogram - mechanical thrombectomy Left MCA    Post procedure - rec BP goals per NS - -160 for perfusion     Some improvement in right sided paresis noted post-procedure     on Henry 1/heparin qtt   Patient able to move right side - follows simple commands - expressive aphasia ongoing   no acute events overnight     ICU Vital Signs Last 24 Hrs  T(C): 36.9 (10 Oct 2022 05:29), Max: 37.1 (09 Oct 2022 18:04)  T(F): 98.5 (10 Oct 2022 05:29), Max: 98.8 (09 Oct 2022 18:04)  HR: 47 (10 Oct 2022 06:00) (45 - 60) sinus flores   BP: 124/75 (10 Oct 2022 06:00) (120/68 - 152/67)  BP(mean): 93 (10 Oct 2022 06:00) (85 - 99)  ABP: 61/52 (10 Oct 2022 06:00) (61/52 - 154/79)  ABP(mean): 57 (10 Oct 2022 06:00) (57 - 113)  RR: 17 (10 Oct 2022 06:00) (14 - 20)  SpO2: 98% (10 Oct 2022 06:00) (91% - 100%) RA    Qtts:   Henry 1  Heparin 1200 U /Hour - inc to same at 06:30a     I&O's Summary    09 Oct 2022 07:01  -  10 Oct 2022 07:00  --------------------------------------------------------  IN: 265.4 mL / OUT: 250 mL / NET: 15.4 mL    Physical Exam    Heart - regular/flores - No rub/gallop  Lungs - CTA anterior - no rub/gallop  Abd - (+)BS soft NTND (-)r/r/g  Ext - warm to touch no cyanosis/clubbing  Neuro - alert/interactive - (+)expressive aphasia and word finding difficulty - able to move right side 3-4/5; 5/5 on left  Skin- no rash     LABS:                        13.6   11.12 )-----------( 218      ( 10 Oct 2022 02:12 )             39.5     10-10    138  |  106  |  9   ----------------------------<  100<H>  4.0   |  21<L>  |  0.94    Ca    8.4      10 Oct 2022 02:12  Phos  3.1     10-09  Mg     2.0     10-10    TPro  6.9  /  Alb  3.8  /  TBili  1.0  /  DBili  x   /  AST  19  /  ALT  14  /  AlkPhos  82  10-09    PT/INR - ( 10 Oct 2022 02:12 )   PT: 12.8 sec;   INR: 1.07     PTT - ( 10 Oct 2022 02:12 )  PTT:39.0 sec    ABG - ( 10 Oct 2022 02:12 )  pH, Arterial: 7.40  pH, Blood: x     /  pCO2: 34    /  pO2: 134   / HCO3: 21    / Base Excess: -3.0  /  SaO2: 99.5      RADIOLOGY & ADDITIONAL STUDIES: reviewed     Patient with unstable angina/CAD and recent NSTEMI was preOp for OSH - CABG planned 10/11 when developed acute change in mental status - fell/hit head - code stroke initiated - Left MCA CVA s/p Angio/mechanical thrombectomy with improvement in deficits - with ongoing expressive aphasia    1. Neuro/Neurosurgery  s/p mechanical thrombectomy  24 hour post procedure anticipate change neurochecks to q4h  maintain -160 per neurosurgery/neuro recommendations  NPO for now pending formal S/S assessment  PT/OT consulted   Heparin infusion - titrate per pTT to goal 60-80    2. CV  on Henry at this time to obtain BP goals as set by Neuro  sinus flores   Known EF 15%  ASA/statin  OHS cancelled in light of above - anticipate Cath/PCI once recovered from Neuro events  defer to CTS    Maintain glycemic control - serum 100 . HgA1c 5.4  DVT and GI prophylaxis    d/w patient/staff/Neurosurgery and CTS    I have spent/provided stated minutes of critical care time to this patient: 90  INTERVAL HPI/OVERNIGHT EVENTS:    10/9:  Emergent Angiogram - mechanical thrombectomy Left MCA   Multivessel CAD - EF 15%    73yo Male active tobacco use presenting with chest pain/unstable angina with pain radiating to arm - (+)NSTEMI    Cath: 90% mLAD, 95% pLCX, 75% mRCA    transferred from [facility] 10/7 for CTS evaluation and intervention   ECHO 10/8: Multiple segmental abnormalities. Dilated LV. Severely reduced LV systolic function.  Regional wall motion abnormalities. Grade I left ventricular diastolic dysfunction. Dilated LA, mild to mod MR  No pericardial effusion.  Carotid: no stenosis    planned OR 10/11    10/9: patient found down/unresponsive - deep but on forehead/aphasic with right sided plegia - code stroke called    CT Brain 10/9: Hyperdensity w/in the proximal left MCA consistent with likely MCA thrombus, no hemorrhage.  CTa : Left MCA is occluded at its origin with absence of flow also noted involving the proximal portion of the left A1 segment. No large vessel stenosis or occlusion detected involving the remaining major branches     Neuro/Neurosurgery consulted   to OR 10/9 - angiogram - mechanical thrombectomy Left MCA    Post procedure - rec BP goals per NS - -160 for perfusion     Some improvement in right sided paresis noted post-procedure     on Henry 1/heparin qtt   Patient able to move right side - follows simple commands - expressive aphasia ongoing   no acute events overnight     ICU Vital Signs Last 24 Hrs  T(C): 36.9 (10 Oct 2022 05:29), Max: 37.1 (09 Oct 2022 18:04)  T(F): 98.5 (10 Oct 2022 05:29), Max: 98.8 (09 Oct 2022 18:04)  HR: 47 (10 Oct 2022 06:00) (45 - 60) sinus flores   BP: 124/75 (10 Oct 2022 06:00) (120/68 - 152/67)  BP(mean): 93 (10 Oct 2022 06:00) (85 - 99)  ABP: 61/52 (10 Oct 2022 06:00) (61/52 - 154/79)  ABP(mean): 57 (10 Oct 2022 06:00) (57 - 113)  RR: 17 (10 Oct 2022 06:00) (14 - 20)  SpO2: 98% (10 Oct 2022 06:00) (91% - 100%) RA    Qtts:   Henry 1  Heparin 1200 U /Hour - inc to same at 06:30a     I&O's Summary    09 Oct 2022 07:01  -  10 Oct 2022 07:00  --------------------------------------------------------  IN: 265.4 mL / OUT: 250 mL / NET: 15.4 mL    Physical Exam    Heart - regular/flores - No rub/gallop  Lungs - CTA anterior - no rub/gallop  Abd - (+)BS soft NTND (-)r/r/g  Ext - warm to touch no cyanosis/clubbing; swelling right femoral - compression applied and NS contacted   Neuro - alert/interactive - (+)expressive aphasia and word finding difficulty - able to move right side 3-4/5; 5/5 on left  Skin- no rash     LABS:                        13.6   11.12 )-----------( 218      ( 10 Oct 2022 02:12 )             39.5     10-10    138  |  106  |  9   ----------------------------<  100<H>  4.0   |  21<L>  |  0.94    Ca    8.4      10 Oct 2022 02:12  Phos  3.1     10-09  Mg     2.0     10-10    TPro  6.9  /  Alb  3.8  /  TBili  1.0  /  DBili  x   /  AST  19  /  ALT  14  /  AlkPhos  82  10-09    PT/INR - ( 10 Oct 2022 02:12 )   PT: 12.8 sec;   INR: 1.07     PTT - ( 10 Oct 2022 02:12 )  PTT:39.0 sec    ABG - ( 10 Oct 2022 02:12 )  pH, Arterial: 7.40  pH, Blood: x     /  pCO2: 34    /  pO2: 134   / HCO3: 21    / Base Excess: -3.0  /  SaO2: 99.5      RADIOLOGY & ADDITIONAL STUDIES: reviewed     Patient with unstable angina/CAD and recent NSTEMI was preOp for OSH - CABG planned 10/11 when developed acute change in mental status - fell/hit head - code stroke initiated - Left MCA CVA s/p Angio/mechanical thrombectomy with improvement in deficits - with ongoing expressive aphasia    1. Neuro/Neurosurgery  s/p mechanical thrombectomy  24 hour post procedure anticipate change neurochecks to q4h  maintain -160 per neurosurgery/neuro recommendations  NPO for now pending formal S/S assessment  PT/OT consulted   Heparin infusion - titrate per pTT to goal 60-80  Monitor femoral site - NS contacted - heparin on hold for now and labs to be sent     2. CV  on Henry at this time to obtain BP goals as set by Neuro  sinus flores   Known EF 15%  ASA/statin  OHS cancelled in light of above - anticipate Cath/PCI once recovered from Neuro events  defer to CTS    Maintain glycemic control - serum 100 . HgA1c 5.4  DVT and GI prophylaxis    d/w patient/staff/Neurosurgery and CTS    I have spent/provided stated minutes of critical care time to this patient: 90

## 2022-10-10 NOTE — CONSULT NOTE ADULT - ASSESSMENT
Assessment: 72y Male without reliable medical followup pw chest pain admitted for CABG found to have an MCA occlusion during admission. POD1 s/p mechanical thrombectomy cb R groin puncture site hematoma. Soft groin, triphasic pulses.    Recommendations: FINAL PLAN PENDING DISCUSSION WITH ATTENDING  - no emergent vascular surgery indicated  - please obtain stat cbc, arterial duplex of RLE.  - please notify for changes in VS or pulse exam  - vascular will follow  - discussed with Chief on call  - call [phone] with questions Assessment: 72y Male without reliable medical followup pw chest pain admitted for CABG found to have an MCA occlusion during admission. POD1 s/p mechanical thrombectomy cb R groin puncture site hematoma. Soft groin, triphasic pulses.    Recommendations: FINAL PLAN PENDING DISCUSSION WITH ATTENDING  - no emergent vascular surgery indicated  - please obtain stat cbc, arterial duplex of RLE.  - please notify for changes in VS or pulse exam  - vascular will follow  - discussed with Chief on call  - call [phone] with questions    Chief Addendum:  Agree with above. US reviewed, no signs of hematoma/pseudoaneurysm. No vascular interventions, continue neuro/pulse checks. Care per primary.

## 2022-10-10 NOTE — PHYSICAL THERAPY INITIAL EVALUATION ADULT - GAIT DEVIATIONS NOTED, PT EVAL
fairly steady gait, no lose of balance, decreased heel strike and hip flexion bilaterally./decreased yoseph/increased time in double stance/decreased step length

## 2022-10-10 NOTE — PHYSICAL THERAPY INITIAL EVALUATION ADULT - PERTINENT HX OF CURRENT PROBLEM, REHAB EVAL
Pt is a 73 yo male presented to the emergency department with complaint of substernal chest pain radiating to bilateral arms, planned to undergo cardiac bypass this week. Stroke code was called on 10/9 after patient was found on the floor with a large right forehead laceration, aphasic, and with right sided weakness. He was brought to [facility] which was negative for hemorrhage but demonstrated a hyperdensity within the proximal L MCA consistent with likely MCA thrombus; s/p cerebral angiogram via R CFA for L M1 mechanical thrombectomy 10/9/2022

## 2022-10-10 NOTE — PHYSICAL THERAPY INITIAL EVALUATION ADULT - FOLLOWS COMMANDS/ANSWERS QUESTIONS, REHAB EVAL
follow simple command~60% of the time, but requires repeated instruction.  difficult to follow multi steps commands./aphasia

## 2022-10-10 NOTE — OCCUPATIONAL THERAPY INITIAL EVALUATION ADULT - DIAGNOSIS, OT EVAL
Pt s/p cerebral angiogram via R CFA for L M1 mechanical thrombectomy presents with global aphasia, impaired motor planning, and decreased balance impacting overall ease and safety when completing ADLs and functional mobility tasks.

## 2022-10-10 NOTE — OCCUPATIONAL THERAPY INITIAL EVALUATION ADULT - PLANNED THERAPY INTERVENTIONS, OT EVAL
ADL retraining/IADL retraining/balance training/bed mobility training/cognitive, visual perceptual/neuromuscular re-education/ROM/strengthening/transfer training

## 2022-10-10 NOTE — SPEECH LANGUAGE PATHOLOGY EVALUATION - COMMENTS
Suspect basic comprehension of familiar words/numbers to be intact AEB pt's ability to respond to orientation questions via pointing amongst a F:2 written choices Imitation of vowels, CV, CVC, CVCV with articulatory placement cues  -consonant and vowel substitutions and consonant omissions   -inconsistent errors during multiple attempts   e.g. of errors: "oo"/o, "he-ho"/pillow, "too-doo"/window  -production of bisyllabic words improved with use of melodic intonation/tapping Pt will identify objects in a visual field of 2 with 60% accuracy and mod. cues for scanning and initiation over 3 consecutive sessions.  Pt will follow 1step commands with 60% accuracy and mod. cues over 3 consecutive sessions.   Pt will indicate preference (yes/no, choice of 2) via pointing during 60% of opportunities given mod. cues for attention and initiation.   Pt will imitate CV, CVC words during 60% of opportunities given max. cues.

## 2022-10-10 NOTE — PROGRESS NOTE ADULT - SUBJECTIVE AND OBJECTIVE BOX
Neurology Stroke Progress Note    INTERVAL HPI/OVERNIGHT EVENTS:  Patient seen and examined, laying in recliner. S/p thrombectomy on 10/9, TICI 0-3.    MEDICATIONS  (STANDING):  aspirin  chewable 81 milliGRAM(s) Oral daily  atorvastatin 40 milliGRAM(s) Oral at bedtime  DOBUTamine Infusion 3 MICROgram(s)/kG/Min (6.43 mL/Hr) IV Continuous <Continuous>  heparin  Infusion. 1200 Unit(s)/Hr (12 mL/Hr) IV Continuous <Continuous>  influenza  Vaccine (HIGH DOSE) 0.7 milliLiter(s) IntraMuscular once  pantoprazole  Injectable 40 milliGRAM(s) IV Push daily  phenylephrine    Infusion 0.1 MICROgram(s)/kG/Min (2.68 mL/Hr) IV Continuous <Continuous>  senna 2 Tablet(s) Oral at bedtime  sodium chloride 0.9% lock flush 3 milliLiter(s) IV Push every 8 hours    MEDICATIONS  (PRN):  acetaminophen     Tablet .. 650 milliGRAM(s) Oral every 6 hours PRN Temp greater or equal to 38C (100.4F), Mild Pain (1 - 3)      Allergies    No Known Allergies    Intolerances        Vital Signs Last 24 Hrs  T(C): 37 (10 Oct 2022 09:50), Max: 37.1 (09 Oct 2022 18:04)  T(F): 98.6 (10 Oct 2022 09:50), Max: 98.8 (09 Oct 2022 18:04)  HR: 62 (10 Oct 2022 12:00) (43 - 62)  BP: 121/58 (10 Oct 2022 12:00) (110/57 - 152/67)  BP(mean): 83 (10 Oct 2022 12:00) (80 - 99)  RR: 18 (10 Oct 2022 12:00) (14 - 20)  SpO2: 100% (10 Oct 2022 12:00) (91% - 100%)    Parameters below as of 10 Oct 2022 12:00  Patient On (Oxygen Delivery Method): room air    Neurologic:  -Mental status: Awake, alert, has mixed aphasia (both expressive and receptive), able to follow some simple one step commands (close eyes). Appears to have some dysarthria as well when attempting to verbalize. When shown a pen, attempts to state "pen" but can only say the first syllable.   -Cranial nerves:   II: Visual fields are full to confrontation.  III, IV, VI: No longer has left gaze preference, able to cross midline.   VII: Right facial droop  Motor: Normal bulk and tone. No pronator drift. Strength bilateral upper extremity 5/5, bilateral lower extremities 5/5.  Sensation: Intact to light touch bilaterally.  Coordination: Unable to understand directions to perform FTN    LABS:                        13.9   10.60 )-----------( 226      ( 10 Oct 2022 08:42 )             42.5     10-10    141  |  103  |  10  ----------------------------<  117<H>  5.0   |  24  |  1.07    Ca    9.0      10 Oct 2022 08:42  Phos  3.6     10-10  Mg     2.3     10-10    TPro  6.9  /  Alb  3.8  /  TBili  1.0  /  DBili  x   /  AST  19  /  ALT  14  /  AlkPhos  82  10-09    PT/INR - ( 10 Oct 2022 08:42 )   PT: 13.0 sec;   INR: 1.09          PTT - ( 10 Oct 2022 08:42 )  PTT:50.5 sec      RADIOLOGY & ADDITIONAL TESTS:  < from: CT Brain Stroke Protocol (10.09.22 @ 17:59) >  IMPRESSION: Hyperdensity within the proximal left MCA consistent with   likely MCA thrombus, no acute intracranial hemorrhage.    < end of copied text >    < from: CT Angio Neck Stroke Protocol w/ IV Cont (10.09.22 @ 18:00) >  OCCLUSION OF THE LEFT INTERNAL CAROTID ARTERY TERMINUS extending to the   proximal anterior and middle cerebral arteries. Thrombus is distal to   left fetal PCA.    The patient underwent diagnostic and therapeutic angiogram with   thrombectomy following CTA. At time of first angiogram run, the ICA and   BRANDEN appear to have recanalized while the MCA remained thrombosed. On this   CTA, the left BRANDEN fills via A-comm. Paucity of contrast in left MCA   branches distal to thrombus suggests poor collaterals.    CTA neck shows no significant steno-occlusive disease or sign of   dissection.      < end of copied text >

## 2022-10-10 NOTE — OCCUPATIONAL THERAPY INITIAL EVALUATION ADULT - NS ASR FOLLOW COMMAND OT EVAL
inconsistent command following 2/2 global aphasia, benefits from repeated verbal cues and visual cues for patient to imitate and sequence./75% of the time/able to follow single-step instructions/aphasia/oral apraxia

## 2022-10-10 NOTE — CONSULT NOTE ADULT - SUBJECTIVE AND OBJECTIVE BOX
Attending:      HPI:  Patient is a 72-year-old male nicotine dependence who has not followed with physicians regularly presented to the emergency department with complaint of substernal chest pain radiating to bilateral arms. Cardiac cath showed (90% mLAD, 95% pLCX, 75% mRCA).  Denies GI bleeding, stroke, melena, varicosities. While admitted for planned CABG stroke code called on 10/9, found to have MCA occlusion, underwent mechanical thrombectomy. Initial groin check wnl. This am after pt moved to chair, primary team noticed large hematoma at site of R groin puncture with doppler signal in PT but no identifiable DP. Vascular consulted for potential occlusive hematoma.    Interview reveals aphasia.     (07 Oct 2022 18:34)      PAST MEDICAL & SURGICAL HISTORY:  H/O inguinal hernia repair          MEDICATIONS  (STANDING):  aspirin  chewable 81 milliGRAM(s) Oral daily  atorvastatin 40 milliGRAM(s) Oral at bedtime  heparin  Infusion. 1200 Unit(s)/Hr (12 mL/Hr) IV Continuous <Continuous>  influenza  Vaccine (HIGH DOSE) 0.7 milliLiter(s) IntraMuscular once  pantoprazole  Injectable 40 milliGRAM(s) IV Push daily  phenylephrine    Infusion 0.1 MICROgram(s)/kG/Min (2.68 mL/Hr) IV Continuous <Continuous>  senna 2 Tablet(s) Oral at bedtime  sodium chloride 0.9% lock flush 3 milliLiter(s) IV Push every 8 hours    MEDICATIONS  (PRN):  acetaminophen     Tablet .. 650 milliGRAM(s) Oral every 6 hours PRN Temp greater or equal to 38C (100.4F), Mild Pain (1 - 3)      Allergies    No Known Allergies    Intolerances        SOCIAL HISTORY:    FAMILY HISTORY:      Vital Signs Last 24 Hrs  T(C): 36.9 (10 Oct 2022 05:29), Max: 37.1 (09 Oct 2022 18:04)  T(F): 98.5 (10 Oct 2022 05:29), Max: 98.8 (09 Oct 2022 18:04)  HR: 46 (10 Oct 2022 09:00) (43 - 60)  BP: 125/59 (10 Oct 2022 09:00) (117/61 - 152/67)  BP(mean): 85 (10 Oct 2022 09:00) (84 - 99)  RR: 18 (10 Oct 2022 09:00) (14 - 20)  SpO2: 100% (10 Oct 2022 09:00) (91% - 100%)    Parameters below as of 10 Oct 2022 09:00  Patient On (Oxygen Delivery Method): room air        I&O's Summary    09 Oct 2022 07:01  -  10 Oct 2022 07:00  --------------------------------------------------------  IN: 304.2 mL / OUT: 250 mL / NET: 54.2 mL    10 Oct 2022 07:01  -  10 Oct 2022 09:27  --------------------------------------------------------  IN: 565.6 mL / OUT: 0 mL / NET: 565.6 mL        Physical Exam:  General: NAD, resting comfortably  Pulmonary: normal resp effort  Cardiovascular: NSR  Abdominal: soft, NT/ND  Extremities: WWP. groin soft, with large hematoma at right groin approx 8cm across.  Pulses:   Right: triphasic DP, PT.  Left: palpable DP, PT.    LABS:                        13.9   10.60 )-----------( 226      ( 10 Oct 2022 08:42 )             42.5     10-10    138  |  106  |  9   ----------------------------<  100<H>  4.0   |  21<L>  |  0.94    Ca    8.4      10 Oct 2022 02:12  Phos  3.1     10-09  Mg     2.0     10-10    TPro  6.9  /  Alb  3.8  /  TBili  1.0  /  DBili  x   /  AST  19  /  ALT  14  /  AlkPhos  82  10-09    PT/INR - ( 10 Oct 2022 08:42 )   PT: 13.0 sec;   INR: 1.09          PTT - ( 10 Oct 2022 08:42 )  PTT:50.5 sec    CAPILLARY BLOOD GLUCOSE      POCT Blood Glucose.: 106 mg/dL (09 Oct 2022 19:18)    LIVER FUNCTIONS - ( 09 Oct 2022 19:22 )  Alb: 3.8 g/dL / Pro: 6.9 g/dL / ALK PHOS: 82 U/L / ALT: 14 U/L / AST: 19 U/L / GGT: x             Cultures:

## 2022-10-10 NOTE — OCCUPATIONAL THERAPY INITIAL EVALUATION ADULT - PERTINENT HX OF CURRENT PROBLEM, REHAB EVAL
Pt is a 73 yo male presented to the emergency department with complaint of substernal chest pain radiating to bilateral arms, planned to undergo cardiac bypass this week. Stroke code was called on 10/9 after patient was found on the floor with a large right forehead laceration, aphasic, and with right sided weakness. He was brought to [facility] which was negative for hemorrhage but demonstrated a hyperdensity within the proximal L MCA consistent with likely MCA thrombus; s/p cerebral angiogram via R CFA for L M1 mechanical thrombectomy 10/9/2022

## 2022-10-10 NOTE — SWALLOW BEDSIDE ASSESSMENT ADULT - SWALLOW EVAL: DIAGNOSIS
Pt presents with mild-moderate oral phase deficits s/p CVA. Pharyngeal phase was clinically judged to be WFL. Recommend a modified diet of soft and bite sized solids with thin liquids. Asp. precautions in place given reduced mobility and dependency on others for feeding assistnace.

## 2022-10-10 NOTE — SPEECH LANGUAGE PATHOLOGY EVALUATION - SLP PERTINENT HISTORY OF CURRENT PROBLEM
presented to the emergency department with complaint of substernal chest pain radiating to bilateral arms, planned to undergo cardiac bypass this week. Stroke code was called on 10/9 after patient was found on the floor with a large right forehead laceration, aphasic, and with right sided weakness. [facility] but demonstrated a hyperdensity within the proximal L MCA consistent with likely MCA thrombus. CTP significant for 300cc mismatch in L MCA territory. CTA head and neck performed with L MCA occlusion at the origin with absence of flow also noted involving the proximal portion of the left A1 segment.

## 2022-10-10 NOTE — OCCUPATIONAL THERAPY INITIAL EVALUATION ADULT - ADDITIONAL COMMENTS
Pt with difficulty providing prior level of function 2/2 expressive aphasia. Son and daughter at bedside to provide history: Patient lives with his son in a 2 story house, a few steps to enter from front porch. Patient's bedroom is on the 2nd floor. Patient has a tub/shower combination, no grab bars. Prior to admit, pt independent with ADLs/IADLs and mobility, did not require any DME or ADs. Pt is R hand dominant. Patient wears glasses mainly for reading, but family states that patient should be wearing glasses more often as vision declining.

## 2022-10-10 NOTE — PROGRESS NOTE ADULT - NS ATTEND AMEND GEN_ALL_CORE FT
Patient is a 72-year-old gentleman admitted for CABG preop work-up.  Stroke code called 10/9 after patient was found down, aphasic, and with right-sided weakness.  CT head showed a hyperdense left MCA sign, confirmed on CTA with CTP showing large area of penumbra and no core.  He was taken for thrombectomy. NIHSS 21 to 4. Etiology of stroke unclear - possibly related to Low EF is possibility but no LV thrombus was seen on TTE with contrast the day prior; no a fib has been detected here and he has no significant large vessel stenosis. Repeat TTE actually showed improved LV function with EF 15%-->35%. Rec THAO. Repeat HCT. If negative for ICH, would recommend DAPT. No IV heparin indicated from stroke perspective. Continue statin. Patient is a 72-year-old gentleman admitted for CABG preop work-up.  Stroke code called 10/9 after patient was found down, aphasic, and with right-sided weakness.  CT head showed a hyperdense left MCA sign, confirmed on CTA with CTP showing large area of penumbra and no core.  He was taken for thrombectomy. NIHSS 21 to 4. Etiology of stroke unclear - possibly related to Low EF but no LV thrombus was seen on TTE with contrast the day prior; no a fib has been detected here and he has no significant large vessel stenosis. Repeat TTE today actually showed improved LV function with EF 15%-->35%. Rec THAO. Repeat HCT. If negative for ICH, would recommend DAPT. No IV heparin indicated from stroke perspective. Continue statin.

## 2022-10-10 NOTE — SPEECH LANGUAGE PATHOLOGY EVALUATION - SLP CONVERSATIONAL SPEECH
Significantly reduced verbal output. Spontaneous productions were limited to mostly vowels or CV with errors. Productions were effortful and errors were inconsistent.

## 2022-10-10 NOTE — PHYSICAL THERAPY INITIAL EVALUATION ADULT - GENERAL OBSERVATIONS, REHAB EVAL
Pt received OOB sitting in the recliner chair, +EKG, +IV, +R groin hematoma, family present, OT Kelly present t/o. Pt left as found, line intact, call sullivan in reach, RN Marisol aponte, family present.

## 2022-10-10 NOTE — PROGRESS NOTE ADULT - ASSESSMENT
Patient is a 72-year-old male with nicotine dependence who has not followed with physicians regularly, presented to the emergency department with complaint of substernal chest pain radiating to bilateral arms, planned to undergo cardiac bypass this week. Stroke code was called on 10/9 after patient was found on the floor with a large right forehead laceration, aphasic, and with right sided weakness. He was brought to [facility] which was negative for hemorrhage but demonstrated a hyperdensity within the proximal L MCA consistent with likely MCA thrombus. CTP significant for 300cc mismatch in L MCA territory. CTA head and neck performed with L MCA occlusion at the origin with absence of flow also noted involving the proximal portion of the left A1 segment. NIHSS 21. Patient deemed not a tPA candidate 2/2 head trauma.     - OK to continue ASA 81mg QD  - if considering starting Plavix 75mg QD or AC i/s/o reduced EF, recommend obtaining NCHCT prior to initiation to rule out hemorrhage and to assess size of infarct to help determine hemorrhagic conversion risk  - continue frequent neuro checks  - STAT HCT with any acute neurologic changes    Case discussed with Dr. Bre Moore   Patient is a 72-year-old male with nicotine dependence who has not followed with physicians regularly, presented to the emergency department with complaint of substernal chest pain radiating to bilateral arms, planned to undergo cardiac bypass this week. Stroke code was called on 10/9 after patient was found on the floor with a large right forehead laceration, aphasic, and with right sided weakness. He was brought to [facility] which was negative for hemorrhage but demonstrated a hyperdensity within the proximal L MCA consistent with likely MCA thrombus. CTP significant for 300cc mismatch in L MCA territory. CTA head and neck performed with L MCA occlusion at the origin with absence of flow also noted involving the proximal portion of the left A1 segment. NIHSS 21. Patient deemed not a tPA candidate 2/2 head trauma.     - OK to continue ASA 81mg QD  - if considering starting Plavix 75mg QD or AC i/s/o reduced EF, recommend obtaining NCHCT (repeat today would be most ideal after thrombectomy) prior to initiation to rule out hemorrhage and to assess size of infarct to help determine hemorrhagic conversion risk  - stop heparin - not needed from stroke perspective  - continue frequent neuro checks  - STAT HCT with any acute neurologic changes    Case discussed with Dr. Bre Moore   Patient is a 72-year-old male with nicotine dependence who has not followed with physicians regularly, presented to the emergency department with complaint of substernal chest pain radiating to bilateral arms, planned to undergo cardiac bypass this week. Stroke code was called on 10/9 after patient was found on the floor with a large right forehead laceration, aphasic, and with right sided weakness. He was brought to [facility] which was negative for hemorrhage but demonstrated a hyperdensity within the proximal L MCA consistent with likely MCA thrombus. CTP significant for 300cc mismatch in L MCA territory. CTA head and neck performed with L MCA occlusion at the origin with absence of flow also noted involving the proximal portion of the left A1 segment. NIHSS 21. Patient deemed not a tPA candidate 2/2 head trauma.     - OK to continue ASA 81mg QD  - Recommend obtaining NCHCT (repeat today would be most ideal after thrombectomy)to rule out hemorrhage and to assess size of infarct to help determine hemorrhagic conversion risk  - If repeat HCT negative for ICH, would recommend to add Plavix.   - stop heparin - not needed from stroke perspective  - Repeat TTC w contrast; if negative, THAO  - continue frequent neuro checks  - STAT HCT with any acute neurologic changes    Case discussed with Dr. Bre Moore   Patient is a 72-year-old male with nicotine dependence who has not followed with physicians regularly, presented to the emergency department with complaint of substernal chest pain radiating to bilateral arms, planned to undergo cardiac bypass this week. Stroke code was called on 10/9 after patient was found on the floor with a large right forehead laceration, aphasic, and with right sided weakness. He was brought to [facility] which was negative for hemorrhage but demonstrated a hyperdensity within the proximal L MCA consistent with likely MCA thrombus. CTP significant for 300cc mismatch in L MCA territory. CTA head and neck performed with L MCA occlusion at the origin with absence of flow also noted involving the proximal portion of the left A1 segment. NIHSS 21. Patient deemed not a tPA candidate 2/2 head trauma.     - OK to continue ASA 81mg QD  - Recommend obtaining NCHCT (repeat today would be most ideal after thrombectomy)to rule out hemorrhage and to assess size of infarct to help determine hemorrhagic conversion risk  - If repeat HCT negative for ICH, would recommend to add Plavix.   - stop heparin - not needed from stroke perspective  - Repeat TTE w contrast; if negative, THAO  - continue frequent neuro checks  - STAT HCT with any acute neurologic changes    Case discussed with Dr. Bre Moore

## 2022-10-10 NOTE — PHYSICAL THERAPY INITIAL EVALUATION ADULT - MODALITIES TREATMENT COMMENTS
--- hand dominant; (R) CN Testing: B/L Frontalis intact; B/L buccinator intact; smile symmetrical; tongue protrusion at midline; B/L eyes open/close intact; Shoulder elevation: unable to perform due to command following(with multiple attempts(VCs and visual cues); Vision H-Test: bilateral tracking and smooth pursuit intact; Convergence/Divergence: intact; Vision Quadrant Test: unable to assess secondary pt has difficult to follow commands.(with multiple attempt)

## 2022-10-10 NOTE — OCCUPATIONAL THERAPY INITIAL EVALUATION ADULT - GENERAL OBSERVATIONS, REHAB EVAL
MRS 4. Pt received seated in chair, family at bedside, +tele, +IV, +R groin hematoma, +R forehead laceration bandage C/D/I, in NAD and agreeable to OT. Cleared by JAZMYN Damon to see.

## 2022-10-10 NOTE — SPEECH LANGUAGE PATHOLOGY EVALUATION - SLP DIAGNOSIS
Pt presented with mixed aphasia impacting both expressive and receptive language skills. 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 Regarding speech, assessment revealed difficulties with naming, verbal repetition, and fluency. Sound errors were inconsistent amongst consecutive productions. Pt benefited from DYLON and articulatory placement cues. Receptive language deficits impacted pt's ability to follow simple directions, respond to basic questions, and point to target items. Pt benefited from gestural imitation, field choices, and written choices. Given the severity of presentation, wants and needs must be anticipated at this time.

## 2022-10-10 NOTE — PHYSICAL THERAPY INITIAL EVALUATION ADULT - ORIENTATION, REHAB EVAL
pt able to tell PT about the appropriate place and time when PT provides choice to pt./person/place/time

## 2022-10-10 NOTE — SWALLOW BEDSIDE ASSESSMENT ADULT - SLP GENERAL OBSERVATIONS
Received sleeping in chair but easily awoke to verbal stimuli. Continues to present with severe expressive aphasia and ?apraxia. However, receptive language skills are improving compared to earlier this date.

## 2022-10-10 NOTE — PHYSICAL THERAPY INITIAL EVALUATION ADULT - IMPAIRED TRANSFERS: SIT/STAND, REHAB EVAL
impaired balance/impaired postural control/decreased strength Surgeon Performing Repair (Optional): Keri Martinez MD

## 2022-10-10 NOTE — OCCUPATIONAL THERAPY INITIAL EVALUATION ADULT - LEVEL OF INDEPENDENCE: DRESS LOWER BODY, OT EVAL
don L sock using figure 4 technique, hand over hand assist to initiate bringing LLE up to opposite knee and to thread sock with pt then initiating to pull past heel/moderate assist (50% patients effort)

## 2022-10-10 NOTE — PROGRESS NOTE ADULT - SUBJECTIVE AND OBJECTIVE BOX
NEUROSURGERY PROGRESS NOTE   HPI:  Patient is a 72-year-old male nicotine dependence who has not followed with physicians regularly presented to the emergency department with complaint of substernal chest pain radiating to bilateral  arms. Patient reports he had initial episode on 10/3 upon awakening. It lasted approximately 15 minutes patient took aspirin at home and resolved. Patient had another episode of chest pain   radiating to bilateral arms this morning lasting approximately 15 minutes again took a dose of aspirin. Reoccurred yesterday afternoon and presented to the emergency department for further   evaluation. In the ER patient was found to have inverted T waves in the lateral leads and markedly elevated troponin. Patient was chest pain-free upon arrival in the ER. Emergency department spoke   with interventional cardiology who recommend loading the patient with Plavix starting on heparin drip for planned cardiac catheterization. Patient denies any associated neck or jaw pain   with his chest pain symptoms. Patient denies any cardiac evaluation in the past. He denies any family history of heart disease. Cardiac cath showed (90% mLAD, 95% pLCX, 75% mRCA).  Denies GI bleeding, stroke, melena, varicosities.     (07 Oct 2022 18:34)    OVERNIGHT EVENTS: As of this morning, acute new right groin hematoma near puncture site. Manual pressure held. Right DP pulses with    Vital Signs Last 24 Hrs  T(C): 37 (10 Oct 2022 09:50), Max: 37.1 (09 Oct 2022 18:04)  T(F): 98.6 (10 Oct 2022 09:50), Max: 98.8 (09 Oct 2022 18:04)  HR: 44 (10 Oct 2022 10:00) (43 - 60)  BP: 117/58 (10 Oct 2022 10:00) (117/58 - 152/67)  BP(mean): 84 (10 Oct 2022 10:00) (84 - 99)  RR: 18 (10 Oct 2022 10:00) (14 - 20)  SpO2: 99% (10 Oct 2022 10:00) (91% - 100%)    Parameters below as of 10 Oct 2022 10:00  Patient On (Oxygen Delivery Method): room air        I&O's Summary    09 Oct 2022 07:01  -  10 Oct 2022 07:00  --------------------------------------------------------  IN: 304.2 mL / OUT: 250 mL / NET: 54.2 mL    10 Oct 2022 07:01  -  10 Oct 2022 10:55  --------------------------------------------------------  IN: 565.6 mL / OUT: 0 mL / NET: 565.6 mL        PHYSICAL EXAM:      TUBES/LINES:  [] Welch  [] Lumbar Drain  [] Wound Drains  [] Others      DIET:  [] NPO  [] Mechanical  [] Tube feeds    LABS:                        13.9   10.60 )-----------( 226      ( 10 Oct 2022 08:42 )             42.5     10-10    141  |  103  |  10  ----------------------------<  117<H>  5.0   |  24  |  1.07    Ca    9.0      10 Oct 2022 08:42  Phos  3.6     10-10  Mg     2.3     10-10    TPro  6.9  /  Alb  3.8  /  TBili  1.0  /  DBili  x   /  AST  19  /  ALT  14  /  AlkPhos  82  10-09    PT/INR - ( 10 Oct 2022 08:42 )   PT: 13.0 sec;   INR: 1.09          PTT - ( 10 Oct 2022 08:42 )  PTT:50.5 sec    CARDIAC MARKERS ( 08 Oct 2022 20:27 )  x     / 0.32 ng/mL / x     / x     / x          CAPILLARY BLOOD GLUCOSE      POCT Blood Glucose.: 106 mg/dL (09 Oct 2022 19:18)      Drug Levels: [] N/A    CSF Analysis: [] N/A      Allergies    No Known Allergies    Intolerances      MEDICATIONS:  Antibiotics:    Neuro:  acetaminophen     Tablet .. 650 milliGRAM(s) Oral every 6 hours PRN    Anticoagulation:  aspirin  chewable 81 milliGRAM(s) Oral daily  heparin  Infusion. 1200 Unit(s)/Hr IV Continuous <Continuous>    OTHER:  atorvastatin 40 milliGRAM(s) Oral at bedtime  DOBUTamine Infusion 3 MICROgram(s)/kG/Min IV Continuous <Continuous>  influenza  Vaccine (HIGH DOSE) 0.7 milliLiter(s) IntraMuscular once  pantoprazole  Injectable 40 milliGRAM(s) IV Push daily  phenylephrine    Infusion 0.1 MICROgram(s)/kG/Min IV Continuous <Continuous>  senna 2 Tablet(s) Oral at bedtime    IVF:  sodium chloride 0.9% lock flush 3 milliLiter(s) IV Push every 8 hours    CULTURES:    RADIOLOGY & ADDITIONAL TESTS:      ASSESSMENT:  72y Male s/p    PRE OP CABG    Family history of heart disease in brother (Sibling)    Handoff    MEWS Score    Acute ischemic left MCA stroke    Acute ischemic left MCA stroke    Angiogram, cerebral, with mechanical thrombectomy    H/O inguinal hernia repair    [id number]        PLAN:  NEURO:    CARDIOVASCULAR:    PULMONARY:    GI:    :    RENAL:    HEME:    ID:    ENDO:    DVT PROPHYLAXIS:  [] Venodynes                                [] Heparin/Lovenox    DISPOSITION:    Assessment:  Present when checked    []  GCS  E   V  M     Heart Failure: []Acute, [] acute on chronic , []chronic  Heart Failure:  [] Diastolic (HFpEF), [] Systolic (HFrEF), []Combined (HFpEF and HFrEF), [] RHF, [] Pulm HTN, [] Other    [] RAZA, [] ATN, [] AIN, [] other  [] CKD1, [] CKD2, [] CKD 3, [] CKD 4, [] CKD 5, []ESRD    Encephalopathy: [] Metabolic, [] Hepatic, [] toxic, [] Neurological, [] Other    Abnormal Nurtitional Status: [] malnurtition (see nutrition note), [ ]underweight: BMI < 19, [] morbid obesity: BMI >40, [] Cachexia    [] Sepsis  [] hypovolemic shock,[] cardiogenic shock, [] hemorrhagic shock, [] neuogenic shock  [] Acute Respiratory Failure  []Cerebral edema, [] Brain compression/ herniation,   [] Functional quadriplegia  [] Acute blood loss anemia   NEUROSURGERY PROGRESS NOTE   HPI:  Patient is a 72-year-old male nicotine dependence who has not followed with physicians regularly presented to the emergency department with complaint of substernal chest pain radiating to bilateral  arms. Patient reports he had initial episode on 10/3 upon awakening. It lasted approximately 15 minutes patient took aspirin at home and resolved. Patient had another episode of chest pain   radiating to bilateral arms this morning lasting approximately 15 minutes again took a dose of aspirin. Reoccurred yesterday afternoon and presented to the emergency department for further   evaluation. In the ER patient was found to have inverted T waves in the lateral leads and markedly elevated troponin. Patient was chest pain-free upon arrival in the ER. Emergency department spoke   with interventional cardiology who recommend loading the patient with Plavix starting on heparin drip for planned cardiac catheterization. Patient denies any associated neck or jaw pain   with his chest pain symptoms. Patient denies any cardiac evaluation in the past. He denies any family history of heart disease. Cardiac cath showed (90% mLAD, 95% pLCX, 75% mRCA).  Denies GI bleeding, stroke, melena, varicosities.     (07 Oct 2022 18:34)     OVERNIGHT EVENTS: As of this morning, acute new right groin hematoma near puncture site. Manual pressure held. Right DP pulses unable to be palpated or with doppler so vascular was emergently consulted for bedside dopplers.     Vital Signs Last 24 Hrs  T(C): 37 (10 Oct 2022 09:50), Max: 37.1 (09 Oct 2022 18:04)  T(F): 98.6 (10 Oct 2022 09:50), Max: 98.8 (09 Oct 2022 18:04)  HR: 44 (10 Oct 2022 10:00) (43 - 60)  BP: 117/58 (10 Oct 2022 10:00) (117/58 - 152/67)  BP(mean): 84 (10 Oct 2022 10:00) (84 - 99)  RR: 18 (10 Oct 2022 10:00) (14 - 20)  SpO2: 99% (10 Oct 2022 10:00) (91% - 100%)    Parameters below as of 10 Oct 2022 10:00  Patient On (Oxygen Delivery Method): room air      I&O's Summary    09 Oct 2022 07:01  -  10 Oct 2022 07:00  --------------------------------------------------------  IN: 304.2 mL / OUT: 250 mL / NET: 54.2 mL    10 Oct 2022 07:01  -  10 Oct 2022 10:55  --------------------------------------------------------  IN: 565.6 mL / OUT: 0 mL / NET: 565.6 mL        PHYSICAL EXAM:  General: NAD, pt is comfortably sitting up in bed, on room air  HEENT: CN II-XII grossly intact, PERRL 3mm briskly reactive, EOMI b/l, face symmetric, tongue midline, neck FROM  Cardiovascular: bradycardic   Respiratory: lungs CTAB, no wheezing, rhonchi, or crackles   GI: normoactive BS to auscultation, abd soft, NTND   Neuro: A&Ox3 with choices, expressive aphasia, no pronator drift. Follows commands.  MATHIS x4 spontaneously, LUE/LLE 5/5 throughout, RUE 4+/5, RLE 3/5.   Extremities:  R DP/PT dopplerable (by vascular), left DP/PT palpable 1+  Wound/incision: +right groin incision with steri strips in place. Large hematoma at the right groin approximately ~8x10cm.    LABS:                        13.9   10.60 )-----------( 226      ( 10 Oct 2022 08:42 )             42.5     10-10    141  |  103  |  10  ----------------------------<  117<H>  5.0   |  24  |  1.07    Ca    9.0      10 Oct 2022 08:42  Phos  3.6     10-10  Mg     2.3     10-10    TPro  6.9  /  Alb  3.8  /  TBili  1.0  /  DBili  x   /  AST  19  /  ALT  14  /  AlkPhos  82  10-09    PT/INR - ( 10 Oct 2022 08:42 )   PT: 13.0 sec;   INR: 1.09          PTT - ( 10 Oct 2022 08:42 )  PTT:50.5 sec    CARDIAC MARKERS ( 08 Oct 2022 20:27 )  x     / 0.32 ng/mL / x     / x     / x          CAPILLARY BLOOD GLUCOSE      POCT Blood Glucose.: 106 mg/dL (09 Oct 2022 19:18)      Drug Levels: [] N/A    CSF Analysis: [] N/A      Allergies    No Known Allergies    Intolerances      MEDICATIONS:  Antibiotics:    Neuro:  acetaminophen     Tablet .. 650 milliGRAM(s) Oral every 6 hours PRN    Anticoagulation:  aspirin  chewable 81 milliGRAM(s) Oral daily  heparin  Infusion. 1200 Unit(s)/Hr IV Continuous <Continuous>    OTHER:  atorvastatin 40 milliGRAM(s) Oral at bedtime  DOBUTamine Infusion 3 MICROgram(s)/kG/Min IV Continuous <Continuous>  influenza  Vaccine (HIGH DOSE) 0.7 milliLiter(s) IntraMuscular once  pantoprazole  Injectable 40 milliGRAM(s) IV Push daily  phenylephrine    Infusion 0.1 MICROgram(s)/kG/Min IV Continuous <Continuous>  senna 2 Tablet(s) Oral at bedtime    IVF:  sodium chloride 0.9% lock flush 3 milliLiter(s) IV Push every 8 hours    CULTURES:    RADIOLOGY & ADDITIONAL TESTS:      ASSESSMENT:  Patient is a 72-year-old male with nicotine dependence who has not followed with physicians regularly, presented to the emergency department with complaint of substernal chest pain radiating to bilateral arms, planned to undergo cardiac bypass this week. Stroke code was called on 10/9 after patient was found on the floor with a large right forehead laceration, aphasic, and with right sided weakness. He was brought to [facility] which was negative for hemorrhage but demonstrated a hyperdensity within the proximal L MCA consistent with likely MCA thrombus. CTP significant for 300cc mismatch in L MCA territory. CTA head and neck performed with L MCA occlusion at the origin with absence of flow also noted involving the proximal portion of the left A1 segment. NIHSS 21. Patient deemed not a tPA candidate 2/2 head trauma, now s/p cerebral angiogram for L M1 thrombectomy, TICI 0 to 3 with angioseal closure (10/9), now complicated by new groin hematoma.     PLAN  - Care per primary team  - appreciate vascular team recs  - Agree with arterial ultrasound of RLE  - Monitor pulses and groin   - Neuro checks per stroke neurology   - Recommend SBP goal 120-160 systolic     Assessment and plan discussed with Dr. Sosa    Please call neurosurgery [phone] with any new neurologic changes or worsening of groin hematoma.

## 2022-10-11 LAB
ALBUMIN SERPL ELPH-MCNC: 3.9 G/DL — SIGNIFICANT CHANGE UP (ref 3.3–5)
ALBUMIN SERPL ELPH-MCNC: 4 G/DL — SIGNIFICANT CHANGE UP (ref 3.3–5)
ALP SERPL-CCNC: 61 U/L — SIGNIFICANT CHANGE UP (ref 40–120)
ALP SERPL-CCNC: 67 U/L — SIGNIFICANT CHANGE UP (ref 40–120)
ALT FLD-CCNC: 13 U/L — SIGNIFICANT CHANGE UP (ref 10–45)
ALT FLD-CCNC: 16 U/L — SIGNIFICANT CHANGE UP (ref 10–45)
ANION GAP SERPL CALC-SCNC: 11 MMOL/L — SIGNIFICANT CHANGE UP (ref 5–17)
ANION GAP SERPL CALC-SCNC: 9 MMOL/L — SIGNIFICANT CHANGE UP (ref 5–17)
APTT BLD: 30.1 SEC — SIGNIFICANT CHANGE UP (ref 27.5–35.5)
APTT BLD: 58.9 SEC — HIGH (ref 27.5–35.5)
APTT BLD: 66.3 SEC — HIGH (ref 27.5–35.5)
AST SERPL-CCNC: 19 U/L — SIGNIFICANT CHANGE UP (ref 10–40)
AST SERPL-CCNC: 20 U/L — SIGNIFICANT CHANGE UP (ref 10–40)
BILIRUB SERPL-MCNC: 1.1 MG/DL — SIGNIFICANT CHANGE UP (ref 0.2–1.2)
BILIRUB SERPL-MCNC: 1.2 MG/DL — SIGNIFICANT CHANGE UP (ref 0.2–1.2)
BLD GP AB SCN SERPL QL: NEGATIVE — SIGNIFICANT CHANGE UP
BUN SERPL-MCNC: 9 MG/DL — SIGNIFICANT CHANGE UP (ref 7–23)
BUN SERPL-MCNC: 9 MG/DL — SIGNIFICANT CHANGE UP (ref 7–23)
CALCIUM SERPL-MCNC: 8.6 MG/DL — SIGNIFICANT CHANGE UP (ref 8.4–10.5)
CALCIUM SERPL-MCNC: 8.9 MG/DL — SIGNIFICANT CHANGE UP (ref 8.4–10.5)
CHLORIDE SERPL-SCNC: 104 MMOL/L — SIGNIFICANT CHANGE UP (ref 96–108)
CHLORIDE SERPL-SCNC: 108 MMOL/L — SIGNIFICANT CHANGE UP (ref 96–108)
CO2 SERPL-SCNC: 21 MMOL/L — LOW (ref 22–31)
CO2 SERPL-SCNC: 23 MMOL/L — SIGNIFICANT CHANGE UP (ref 22–31)
CREAT SERPL-MCNC: 1 MG/DL — SIGNIFICANT CHANGE UP (ref 0.5–1.3)
CREAT SERPL-MCNC: 1.09 MG/DL — SIGNIFICANT CHANGE UP (ref 0.5–1.3)
EGFR: 73 ML/MIN/1.73M2 — SIGNIFICANT CHANGE UP
EGFR: 80 ML/MIN/1.73M2 — SIGNIFICANT CHANGE UP
GAS PNL BLDA: SIGNIFICANT CHANGE UP
GAS PNL BLDA: SIGNIFICANT CHANGE UP
GLUCOSE SERPL-MCNC: 109 MG/DL — HIGH (ref 70–99)
GLUCOSE SERPL-MCNC: 113 MG/DL — HIGH (ref 70–99)
HCT VFR BLD CALC: 34.2 % — LOW (ref 39–50)
HCT VFR BLD CALC: 35.4 % — LOW (ref 39–50)
HGB BLD-MCNC: 11.6 G/DL — LOW (ref 13–17)
HGB BLD-MCNC: 12 G/DL — LOW (ref 13–17)
INR BLD: 1.17 — HIGH (ref 0.88–1.16)
INR BLD: 1.2 — HIGH (ref 0.88–1.16)
MAGNESIUM SERPL-MCNC: 1.8 MG/DL — SIGNIFICANT CHANGE UP (ref 1.6–2.6)
MAGNESIUM SERPL-MCNC: 2.3 MG/DL — SIGNIFICANT CHANGE UP (ref 1.6–2.6)
MCHC RBC-ENTMCNC: 31.4 PG — SIGNIFICANT CHANGE UP (ref 27–34)
MCHC RBC-ENTMCNC: 32.3 PG — SIGNIFICANT CHANGE UP (ref 27–34)
MCHC RBC-ENTMCNC: 33.9 GM/DL — SIGNIFICANT CHANGE UP (ref 32–36)
MCHC RBC-ENTMCNC: 33.9 GM/DL — SIGNIFICANT CHANGE UP (ref 32–36)
MCV RBC AUTO: 92.7 FL — SIGNIFICANT CHANGE UP (ref 80–100)
MCV RBC AUTO: 95.2 FL — SIGNIFICANT CHANGE UP (ref 80–100)
NRBC # BLD: 0 /100 WBCS — SIGNIFICANT CHANGE UP (ref 0–0)
NRBC # BLD: 0 /100 WBCS — SIGNIFICANT CHANGE UP (ref 0–0)
PHOSPHATE SERPL-MCNC: 2.9 MG/DL — SIGNIFICANT CHANGE UP (ref 2.5–4.5)
PHOSPHATE SERPL-MCNC: 3.2 MG/DL — SIGNIFICANT CHANGE UP (ref 2.5–4.5)
PLATELET # BLD AUTO: 171 K/UL — SIGNIFICANT CHANGE UP (ref 150–400)
PLATELET # BLD AUTO: 206 K/UL — SIGNIFICANT CHANGE UP (ref 150–400)
POTASSIUM SERPL-MCNC: 3.6 MMOL/L — SIGNIFICANT CHANGE UP (ref 3.5–5.3)
POTASSIUM SERPL-MCNC: 4.4 MMOL/L — SIGNIFICANT CHANGE UP (ref 3.5–5.3)
POTASSIUM SERPL-SCNC: 3.6 MMOL/L — SIGNIFICANT CHANGE UP (ref 3.5–5.3)
POTASSIUM SERPL-SCNC: 4.4 MMOL/L — SIGNIFICANT CHANGE UP (ref 3.5–5.3)
PROT SERPL-MCNC: 6.3 G/DL — SIGNIFICANT CHANGE UP (ref 6–8.3)
PROT SERPL-MCNC: 6.6 G/DL — SIGNIFICANT CHANGE UP (ref 6–8.3)
PROTHROM AB SERPL-ACNC: 13.9 SEC — HIGH (ref 10.5–13.4)
PROTHROM AB SERPL-ACNC: 14.3 SEC — HIGH (ref 10.5–13.4)
RBC # BLD: 3.69 M/UL — LOW (ref 4.2–5.8)
RBC # BLD: 3.72 M/UL — LOW (ref 4.2–5.8)
RBC # FLD: 13 % — SIGNIFICANT CHANGE UP (ref 10.3–14.5)
RBC # FLD: 13.2 % — SIGNIFICANT CHANGE UP (ref 10.3–14.5)
RH IG SCN BLD-IMP: POSITIVE — SIGNIFICANT CHANGE UP
SODIUM SERPL-SCNC: 136 MMOL/L — SIGNIFICANT CHANGE UP (ref 135–145)
SODIUM SERPL-SCNC: 140 MMOL/L — SIGNIFICANT CHANGE UP (ref 135–145)
WBC # BLD: 9.06 K/UL — SIGNIFICANT CHANGE UP (ref 3.8–10.5)
WBC # BLD: 9.15 K/UL — SIGNIFICANT CHANGE UP (ref 3.8–10.5)
WBC # FLD AUTO: 9.06 K/UL — SIGNIFICANT CHANGE UP (ref 3.8–10.5)
WBC # FLD AUTO: 9.15 K/UL — SIGNIFICANT CHANGE UP (ref 3.8–10.5)

## 2022-10-11 PROCEDURE — 99233 SBSQ HOSP IP/OBS HIGH 50: CPT

## 2022-10-11 PROCEDURE — 99232 SBSQ HOSP IP/OBS MODERATE 35: CPT

## 2022-10-11 PROCEDURE — 99292 CRITICAL CARE ADDL 30 MIN: CPT

## 2022-10-11 PROCEDURE — 71045 X-RAY EXAM CHEST 1 VIEW: CPT | Mod: 26

## 2022-10-11 PROCEDURE — 70450 CT HEAD/BRAIN W/O DYE: CPT | Mod: 26

## 2022-10-11 PROCEDURE — 99291 CRITICAL CARE FIRST HOUR: CPT

## 2022-10-11 RX ORDER — PANTOPRAZOLE SODIUM 20 MG/1
40 TABLET, DELAYED RELEASE ORAL
Refills: 0 | Status: DISCONTINUED | OUTPATIENT
Start: 2022-10-12 | End: 2022-10-19

## 2022-10-11 RX ORDER — HEPARIN SODIUM 5000 [USP'U]/ML
850 INJECTION INTRAVENOUS; SUBCUTANEOUS
Qty: 25000 | Refills: 0 | Status: DISCONTINUED | OUTPATIENT
Start: 2022-10-11 | End: 2022-10-11

## 2022-10-11 RX ORDER — POTASSIUM CHLORIDE 20 MEQ
20 PACKET (EA) ORAL ONCE
Refills: 0 | Status: COMPLETED | OUTPATIENT
Start: 2022-10-11 | End: 2022-10-11

## 2022-10-11 RX ORDER — HEPARIN SODIUM 5000 [USP'U]/ML
850 INJECTION INTRAVENOUS; SUBCUTANEOUS
Qty: 25000 | Refills: 0 | Status: DISCONTINUED | OUTPATIENT
Start: 2022-10-11 | End: 2022-10-18

## 2022-10-11 RX ORDER — MAGNESIUM SULFATE 500 MG/ML
2 VIAL (ML) INJECTION ONCE
Refills: 0 | Status: COMPLETED | OUTPATIENT
Start: 2022-10-11 | End: 2022-10-11

## 2022-10-11 RX ORDER — CLOPIDOGREL BISULFATE 75 MG/1
75 TABLET, FILM COATED ORAL DAILY
Refills: 0 | Status: DISCONTINUED | OUTPATIENT
Start: 2022-10-11 | End: 2022-10-19

## 2022-10-11 RX ADMIN — CLOPIDOGREL BISULFATE 75 MILLIGRAM(S): 75 TABLET, FILM COATED ORAL at 12:42

## 2022-10-11 RX ADMIN — SODIUM CHLORIDE 3 MILLILITER(S): 9 INJECTION INTRAMUSCULAR; INTRAVENOUS; SUBCUTANEOUS at 06:09

## 2022-10-11 RX ADMIN — SODIUM CHLORIDE 3 MILLILITER(S): 9 INJECTION INTRAMUSCULAR; INTRAVENOUS; SUBCUTANEOUS at 21:26

## 2022-10-11 RX ADMIN — Medication 20 MILLIEQUIVALENT(S): at 06:18

## 2022-10-11 RX ADMIN — PHENYLEPHRINE HYDROCHLORIDE 2.68 MICROGRAM(S)/KG/MIN: 10 INJECTION INTRAVENOUS at 03:53

## 2022-10-11 RX ADMIN — SENNA PLUS 2 TABLET(S): 8.6 TABLET ORAL at 21:37

## 2022-10-11 RX ADMIN — ATORVASTATIN CALCIUM 40 MILLIGRAM(S): 80 TABLET, FILM COATED ORAL at 21:36

## 2022-10-11 RX ADMIN — Medication 25 GRAM(S): at 03:47

## 2022-10-11 RX ADMIN — SODIUM CHLORIDE 3 MILLILITER(S): 9 INJECTION INTRAMUSCULAR; INTRAVENOUS; SUBCUTANEOUS at 13:02

## 2022-10-11 RX ADMIN — HEPARIN SODIUM 8.5 UNIT(S)/HR: 5000 INJECTION INTRAVENOUS; SUBCUTANEOUS at 09:32

## 2022-10-11 RX ADMIN — PHENYLEPHRINE HYDROCHLORIDE 2.68 MICROGRAM(S)/KG/MIN: 10 INJECTION INTRAVENOUS at 14:54

## 2022-10-11 RX ADMIN — CHLORHEXIDINE GLUCONATE 1 APPLICATION(S): 213 SOLUTION TOPICAL at 06:18

## 2022-10-11 RX ADMIN — HEPARIN SODIUM 5000 UNIT(S): 5000 INJECTION INTRAVENOUS; SUBCUTANEOUS at 06:18

## 2022-10-11 RX ADMIN — Medication 81 MILLIGRAM(S): at 12:41

## 2022-10-11 NOTE — PROGRESS NOTE ADULT - SUBJECTIVE AND OBJECTIVE BOX
CTICU  CRITICAL  CARE  attending     Hand off received 					   Pertinent clinical, laboratory, radiographic, hemodynamic, echocardiographic, respiratory data, microbiologic data and chart were reviewed and analyzed frequently throughout the course of the day and night      72 years old male nicotine dependence who has not followed with physicians regularly  He presented to the emergency department with complaint of substernal chest pain radiating to bilateral arms.  Patient reports he had initial episode on 10/3 upon awakening. It lasted approximately 15 minutes patient took aspirin at home and resolved. Patient had another episode of chest pain   radiating to bilateral arms this morning lasting approximately 15 minutes again took a dose of aspirin.   He presented to the emergency department for further evaluation.   In the ER patient was found to have inverted T waves in the lateral leads and markedly elevated troponin. Patient was chest pain-free upon arrival in the ER.   Emergency department spoke with interventional cardiology who recommend loading the patient with Plavix starting on heparin drip for planned cardiac catheterization.     Cardiac cath showed (90% mLAD, 95% pLCX, 75% mRCA).   He was waiting for CABG.  He fell on the floor after a syncopal episode.    CT Head: Proxima  left Middle Cerebral Artery thrombus. No acute intracranial hemorrhage.  CT perfusion study demonstrates a large near hemispheric area of elevated MTT involving the left frontal, temporal and parietal lobe without corresponding decrease in both CBV or CBF.   The calculated RAPID CBF volume < 30% is 0 ml and a Tmax volume > 6 seconds is 300 ml meaning the mismatch volume is 300 ml and mismatch ratio is infinite.  He was emergently taken for cerebral angiogram and aspiration thrombectomy of the left MCA.      FAMILY HISTORY:  PAST MEDICAL & SURGICAL HISTORY:  H/O inguinal hernia repair        14 system review was unremarkable    Vital signs, hemodynamic and respiratory parameters were reviewed from the bedside nursing flow sheet.  ICU Vital Signs Last 24 Hrs  T(C): 36.5 (11 Oct 2022 21:15), Max: 36.7 (11 Oct 2022 05:01)  T(F): 97.7 (11 Oct 2022 21:15), Max: 98.1 (11 Oct 2022 05:01)  HR: 45 (11 Oct 2022 22:00) (41 - 67)  BP: 121/58 (11 Oct 2022 22:00) (100/54 - 128/60)  BP(mean): 83 (11 Oct 2022 22:00) (73 - 87)  ABP: 122/53 (11 Oct 2022 22:00) (94/64 - 138/53)  ABP(mean): 76 (11 Oct 2022 22:00) (64 - 80)  RR: 18 (11 Oct 2022 22:00) (14 - 18)  SpO2: 98% (11 Oct 2022 22:00) (96% - 100%)    O2 Parameters below as of 11 Oct 2022 22:00  Patient On (Oxygen Delivery Method): room air          Adult Advanced Hemodynamics Last 24 Hrs  CVP(mm Hg): --  CVP(cm H2O): --  CO: --  CI: --  PA: --  PA(mean): --  PCWP: --  SVR: --  SVRI: --  PVR: --  PVRI: --, ABG - ( 11 Oct 2022 15:32 )  pH, Arterial: 7.43  pH, Blood: x     /  pCO2: 31    /  pO2: 109   / HCO3: 21    / Base Excess: -2.7  /  SaO2: 99.9                Intake and output was reviewed and the fluid balance was calculated  Daily     Daily   I&O's Summary    10 Oct 2022 07:01  -  11 Oct 2022 07:00  --------------------------------------------------------  IN: 1766 mL / OUT: 1735 mL / NET: 31 mL    11 Oct 2022 07:01  -  11 Oct 2022 23:15  --------------------------------------------------------  IN: 822.9 mL / OUT: 600 mL / NET: 222.9 mL          Neuro: Mild aphasia and dysarthria. Improved strength in right upper and lower extremities. Coordination is OK.   Neck: No JVD.  CVS: S1, S2, No S3.  Lungs: Good air entry bilaterally.  Abd: Soft. No tenderness. + Bowel sounds.  Vascular: + DP/PT.  Extremities: No edema.  Lymphatic: Normal.  Skin: No abnormalities.      labs  CBC Full  -  ( 11 Oct 2022 15:34 )  WBC Count : 9.06 K/uL  RBC Count : 3.72 M/uL  Hemoglobin : 12.0 g/dL  Hematocrit : 35.4 %  Platelet Count - Automated : 206 K/uL  Mean Cell Volume : 95.2 fl  Mean Cell Hemoglobin : 32.3 pg  Mean Cell Hemoglobin Concentration : 33.9 gm/dL  Auto Neutrophil # : x  Auto Lymphocyte # : x  Auto Monocyte # : x  Auto Eosinophil # : x  Auto Basophil # : x  Auto Neutrophil % : x  Auto Lymphocyte % : x  Auto Monocyte % : x  Auto Eosinophil % : x  Auto Basophil % : x    10-11    140  |  108  |  9   ----------------------------<  113<H>  4.4   |  23  |  1.09    Ca    8.9      11 Oct 2022 15:34  Phos  3.2     10-11  Mg     2.3     10-11    TPro  6.6  /  Alb  3.9  /  TBili  1.1  /  DBili  x   /  AST  19  /  ALT  16  /  AlkPhos  67  10-11    PT/INR - ( 11 Oct 2022 15:34 )   PT: 13.9 sec;   INR: 1.17          PTT - ( 11 Oct 2022 21:32 )  PTT:66.3 sec  The current medications were reviewed   MEDICATIONS  (STANDING):  atorvastatin 40 milliGRAM(s) Oral at bedtime  chlorhexidine 2% Cloths 1 Application(s) Topical <User Schedule>  clopidogrel Tablet 75 milliGRAM(s) Oral daily  DOBUTamine Infusion 3 MICROgram(s)/kG/Min (6.43 mL/Hr) IV Continuous <Continuous>  heparin  Infusion 850 Unit(s)/Hr (9.5 mL/Hr) IV Continuous <Continuous>  influenza  Vaccine (HIGH DOSE) 0.7 milliLiter(s) IntraMuscular once  phenylephrine    Infusion 0.1 MICROgram(s)/kG/Min (2.68 mL/Hr) IV Continuous <Continuous>  senna 2 Tablet(s) Oral at bedtime  sodium chloride 0.9% lock flush 3 milliLiter(s) IV Push every 8 hours    MEDICATIONS  (PRN):  acetaminophen     Tablet .. 650 milliGRAM(s) Oral every 6 hours PRN Temp greater or equal to 38C (100.4F), Mild Pain (1 - 3)               72 years old  Male admitted with triple vessel Coronary Artery Disease.  Hospital course complicated by CVA caused by thrombosis of the left middle cerebral artery.  S/P Left middle cerebral artery thrombectomy.   Emergent femoral cerebral angiogram performed under MAC sedation via right CFA using an 8Fr Long sheath. Left ICA injection with evidence of Left M1 occlusion. Mechanical thrombectomy performed using aspiration, one pass, with recannulization of the left M1. TICI 0 to TICI 3.   Improved strength and coordination in the RULE and RLE.  Hemodynamically stable.  Good oxygenation.  Fair urine out put.        My plan includes :  Schedule for PCI.  Statin and Betablocker.  D/C Dobutamine  D/C Phenylephrine   ANTICOAGULATION as per Neurology.   Close hemodynamic, ventilatory and drain monitoring and management  Monitor for arrhythmias and monitor parameters for organ perfusion  Monitor neurologic status  Monitor renal function.  Head of the bed should remain elevated to 45 deg .   Chest PT and IS will be encouraged  Monitor adequacy of oxygenation and ventilation and attempt to wean oxygen  Nutritional goals will be met using po eventually , ensure adequate caloric intake and monitor the same  Stress ulcer and VTE prophylaxis will be achieved    Glycemic control is satisfactory  Electrolytes have been repleted as necessary and wound care has been carried out. Pain control has been achieved.   Aggressive physical therapy and early mobility and ambulation goals will be met   The family was updated about the course and plan  CRITICAL CARE TIME SPENT in evaluation and management, reassessments, review and interpretation of labs and x-rays, ventilator and hemodynamic management, formulating a plan and coordinating care: ___90____ MIN.  Time does not include procedural time.  CTICU ATTENDING     					    Nik Shaw MD

## 2022-10-11 NOTE — PROGRESS NOTE ADULT - NS ATTEND AMEND GEN_ALL_CORE FT
Patient is a 72-year-old gentleman admitted for CABG preop work-up.  Stroke code called 10/9 after patient developed L MCA syndrome s/p successful thrombectomy. Repeat HCT shows small volume of stroke and patient has improved significantly. Etiology of stroke unclear - possibly related to Low EF but no LV thrombus was seen on TTE with contrast the day prior or repeat TTE 10/10. A fib has not been detected here and he has no significant large vessel stenosis.  Rec THAO. Per cardiology, will need IV Heparin - rec lowest effective ptt goal w/o bolus. Per cardiology, preference is for Plavix over aspirin in addition to IV heparin which will increase risk of hemorrhagic conversion but benefit seems to outweigh that risk at this point. Continue statin. Patient is a 72-year-old gentleman admitted for CABG preop work-up.  Stroke code called 10/9 after patient developed L MCA syndrome s/p successful thrombectomy. Repeat HCT shows small volume of stroke and patient has improved significantly. Etiology of stroke unclear - possibly related to Low EF but no LV thrombus was seen on TTE with contrast the day prior or repeat TTE 10/10. A fib has not been detected here and he has no significant large vessel stenosis.  Rec TAHO. Per cardiology, will need IV Heparin - rec lowest effective ptt goal w/o bolus. Per cardiology, preference is for Plavix over aspirin in addition to IV heparin which will increase risk of hemorrhagic conversion. However, patient needs stronger antiplatelet for CAD per primary team. Continue statin.

## 2022-10-11 NOTE — PROCEDURE NOTE - NSINDICATIONS_GEN_A_CORE
critical patient/monitoring purposes
arterial puncture to obtain ABG's/blood sampling/cannulation purposes/monitoring purposes

## 2022-10-11 NOTE — PROGRESS NOTE ADULT - ASSESSMENT
Patient is a 72-year-old male with nicotine dependence who has not followed with physicians regularly, presented to the emergency department with complaint of substernal chest pain radiating to bilateral arms, planned to undergo cardiac bypass this week. Stroke code was called on 10/9 after patient was found on the floor with a large right forehead laceration, aphasic, and with right sided weakness. He was brought to [facility] which was negative for hemorrhage, CTA did show left MCA occlusion. Initial NIHSS 21. Pt s/p thrombectomy TICI 0-3, with improvement in NIHSS,    1)Secondary stroke prevention  - Continue aspirin 81  - Atorvastatin 40 mg    2) Stroke risk factors  - A1C: 5.4  - LDL: 113  -CAD  - HTN    3) Further management  -Head CT 10/10 with slight left MCA hypodensity  -repeat TTE with contrast (initial TTE did not show any LV thrombus), if negative consider THAO  -q1 hour stroke neuro checks  - -160  - may need outpt neurology follow up  - provide stroke education    DVT prophylaxis   -heparin SQ and SCDs    Case discussed with Dr. Moore Patient is a 72-year-old male with nicotine dependence who has not followed with physicians regularly, presented to the emergency department with complaint of substernal chest pain radiating to bilateral arms, planned to undergo cardiac bypass this week. Stroke code was called on 10/9 after patient was found on the floor with a large right forehead laceration, aphasic, and with right sided weakness. He was brought to [facility] which was negative for hemorrhage, CTA did show left MCA occlusion. Initial NIHSS 21. Pt s/p thrombectomy TICI 0-3, with improvement in NIHSS,    1)Secondary stroke prevention  - Continue aspirin 81 - ok to switch to plavix and heparin drip from stroke perspective, but would prefer lowest effective therapeutic range for heparin drip possible. No bolus  - Atorvastatin 40 mg    2) Stroke risk factors  - A1C: 5.4  - LDL: 113  -CAD  - HTN    3) Further management  -Head CT 10/10 with slight left MCA hypodensity  -repeat TTE with contrast (initial TTE did not show any LV thrombus), if negative consider THAO  -q1 hour stroke neuro checks  - -160  - may need outpt neurology follow up  - provide stroke education    DVT prophylaxis   -heparin SQ and SCDs    Case discussed with Dr. Moore Patient is a 72-year-old male with nicotine dependence who has not followed with physicians regularly, presented to the emergency department with complaint of substernal chest pain radiating to bilateral arms, planned to undergo cardiac bypass this week. Stroke code was called on 10/9 after patient was found on the floor with a large right forehead laceration, aphasic, and with right sided weakness. He was brought to [facility] which was negative for hemorrhage, CTA did show left MCA occlusion. Initial NIHSS 21. Pt s/p thrombectomy TICI 0-3, with improvement in NIHSS,    1)Secondary stroke prevention  - Continue aspirin 81 - ok to switch to plavix and heparin drip from stroke perspective, but would prefer lowest effective therapeutic range for heparin drip possible. No bolus  - Atorvastatin 40 mg    2) Stroke risk factors  - A1C: 5.4  - LDL: 113  -CAD  - HTN    3) Further management  -Head CT 10/10 with slight left MCA hypodensity  -repeat TTE with contrast also did not show any LV thrombus. Would consider THAO  -q1 hour stroke neuro checks  - -160  - may need outpt neurology follow up  - provide stroke education    DVT prophylaxis   -heparin SQ and SCDs    Case discussed with Dr. Moore

## 2022-10-11 NOTE — PROGRESS NOTE ADULT - SUBJECTIVE AND OBJECTIVE BOX
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 Dilated LV. Severely reduced LV systolic function.  Regional wall motion abnormalities. Grade I left ventricular diastolic dysfunction. Dilated LA, mild to mod MR  No pericardial effusion.  Carotid: no stenosis    planned OR 10/11    10/9: patient found down/unresponsive - deep but on forehead/aphasic with right sided plegia - code stroke called    CT Brain 10/9: Hyperdensity w/in the proximal left MCA consistent with likely MCA thrombus, no hemorrhage.  CTa : Left MCA is occluded at its origin with absence of flow also noted involving the proximal portion of the left A1 segment. No large vessel stenosis or occlusion detected involving the remaining major branches     Neuro/Neurosurgery consulted   to OR 10/9 - angiogram - mechanical thrombectomy Left MCA    Post procedure - rec BP goals per NS - -160 for perfusion     Some improvement in right sided paresis noted post-procedure     on Henry 1/heparin qtt   Patient able to move right side - follows simple commands - expressive aphasia ongoing     10/10: noted right femoral cannulation site hematoma - manual pressure applied/heparin infusion stopped and neurosurgery whom evaluated and called neurosurgery  Dobutamine started for sinus flores in 30's and known low EF in setting of higher BP goals for neuro status  ongoing Henry    Doppler obtained:  No right groin hematoma or pseudoaneurysm.  improvement noted - ecchymosis present but no expanding hematoma    formal S/S assessment performed - passed swallow and diet ordered per recommendations - tolerating     repeat CT Brain obtained overnight per neuro request to assess prior to initiation of either dual antiplatelet therapy or systemic anticoagulation     CT Head 10/11: Interval development of hypodensity on the left sub insular region the consistent with known infarct on the left MCA territory. No hemorrhagic transformation.    d/w neuro & cardiology - plan initiate heparin infusion (no bolus) and maintain lower therapeutic pTT    no acute events reported overnight     ICU Vital Signs Last 24 Hrs  T(C): 36.6 (11 Oct 2022 08:34), Max: 37.6 (10 Oct 2022 14:41)  T(F): 97.9 (11 Oct 2022 08:34), Max: 99.6 (10 Oct 2022 14:41)  HR: 67 (11 Oct 2022 08:00) (44 - 76) sinus   BP: 109/53 (11 Oct 2022 07:00) (94/51 - 130/57)  BP(mean): 76 (11 Oct 2022 07:00) (69 - 85)  ABP: 120/55 (11 Oct 2022 08:00) (109/50 - 138/53)  ABP(mean): 80 (11 Oct 2022 08:00) (69 - 80)  RR: 16 (11 Oct 2022 08:00) (14 - 18)  SpO2: 99% (11 Oct 2022 08:00) (96% - 100%) RA    Qtts:   Dobutamine 3  Henry 1    I&O's Summary    10 Oct 2022 07:01  -  11 Oct 2022 07:00  --------------------------------------------------------  IN: 1766 mL / OUT: 1735 mL / NET: 31 mL    11 Oct 2022 07:01  -  11 Oct 2022 09:03  --------------------------------------------------------  IN: 33.2 mL / OUT: 0 mL / NET: 33.2 mL    Physical Exam    Heart - regular (-) r/r/g  Lungs - CTA anterior - no rub/gallop  Abd - (+) Bs soft NTND (-)r/r/g  Ext - warm to touch; no cyanosis/clubbing  Neuro - alert and interactive - ongoing word finding difficulty and expressive aphasia; improvement in strength on right   Skin - no rash     LABS:                        11.6   9.15  )-----------( 171      ( 11 Oct 2022 02:21 )             34.2     10-11    136  |  104  |  9   ----------------------------<  109<H>  3.6   |  21<L>  |  1.00    Ca    8.6      11 Oct 2022 02:21  Phos  2.9     10-11  Mg     1.8     10-11    TPro  6.3  /  Alb  4.0  /  TBili  1.2  /  DBili  x   /  AST  20  /  ALT  13  /  AlkPhos  61  10-11    PT/INR - ( 11 Oct 2022 02:21 )   PT: 14.3 sec;   INR: 1.20     PTT - ( 11 Oct 2022 02:21 )  PTT:30.1 sec    ABG - ( 11 Oct 2022 02:24 )  pH, Arterial: 7.46  pH, Blood: x     /  pCO2: 31    /  pO2: 84    / HCO3: 22    / Base Excess: -0.9  /  SaO2: 98.0      RADIOLOGY & ADDITIONAL STUDIES: reviewed       Patient with unstable angina/CAD and recent NSTEMI was preOp for OSH - CABG planned 10/11 when developed acute change in mental status - fell/hit head - code stroke initiated - Left MCA CVA s/p Angio/mechanical thrombectomy with improvement in plegia/deficits - ongoing expressive aphasia and known CAD    1. Neuro/Neurosurgery  s/p mechanical thrombectomy  neurochecks to q4h  avoid extremes of BP  passed formal S/S assessment - tolerating diet  PT/OT/speech therapy consulted and following  repeat CT performed and d/w Neuro - in light of significant CAD will start heparin qtt (no bolus) and maintain low therapeutic level (pTT to goal 60-70)  Monitor femoral site - stable this am    2. CV  on Henry at this time to obtain BP goals as set by Neuro  sinus flores   Known EF 15%  ASA/statin  OHS cancelled in light of above - anticipate Cath/PCI once recovered from Neuro events  d/w CTS and cardiology   medication optimization as clinical scenario allows   anticipate titrate off  and assess ongoing need for Henry     Maintain glycemic control - serum 109 . HgA1c 5.4  DVT and GI prophylaxis    d/w patient/staff/Neurology/CTS and cardiology    I have spent/provided stated minutes of critical care time to this patient: 90

## 2022-10-11 NOTE — PROCEDURE NOTE - NSPROCDETAILS_GEN_ALL_CORE
location identified, draped/prepped, sterile technique used, needle inserted/introduced/positive blood return obtained via catheter/connected to a pressurized flush line/sutured in place/hemostasis with direct pressure, dressing applied/Seldinger technique/all materials/supplies accounted for at end of procedure
location identified, draped/prepped, sterile technique used, needle inserted/introduced/positive blood return obtained via catheter/connected to a pressurized flush line/sutured in place/Seldinger technique/all materials/supplies accounted for at end of procedure

## 2022-10-12 LAB
ALBUMIN SERPL ELPH-MCNC: 3.6 G/DL — SIGNIFICANT CHANGE UP (ref 3.3–5)
ALBUMIN SERPL ELPH-MCNC: 4.1 G/DL — SIGNIFICANT CHANGE UP (ref 3.3–5)
ALP SERPL-CCNC: 67 U/L — SIGNIFICANT CHANGE UP (ref 40–120)
ALP SERPL-CCNC: 72 U/L — SIGNIFICANT CHANGE UP (ref 40–120)
ALT FLD-CCNC: 15 U/L — SIGNIFICANT CHANGE UP (ref 10–45)
ALT FLD-CCNC: 15 U/L — SIGNIFICANT CHANGE UP (ref 10–45)
ANION GAP SERPL CALC-SCNC: 11 MMOL/L — SIGNIFICANT CHANGE UP (ref 5–17)
ANION GAP SERPL CALC-SCNC: 8 MMOL/L — SIGNIFICANT CHANGE UP (ref 5–17)
APTT BLD: 41.9 SEC — HIGH (ref 27.5–35.5)
APTT BLD: 66.3 SEC — HIGH (ref 27.5–35.5)
AST SERPL-CCNC: 17 U/L — SIGNIFICANT CHANGE UP (ref 10–40)
AST SERPL-CCNC: 20 U/L — SIGNIFICANT CHANGE UP (ref 10–40)
BILIRUB SERPL-MCNC: 0.9 MG/DL — SIGNIFICANT CHANGE UP (ref 0.2–1.2)
BILIRUB SERPL-MCNC: 1 MG/DL — SIGNIFICANT CHANGE UP (ref 0.2–1.2)
BUN SERPL-MCNC: 10 MG/DL — SIGNIFICANT CHANGE UP (ref 7–23)
BUN SERPL-MCNC: 11 MG/DL — SIGNIFICANT CHANGE UP (ref 7–23)
CALCIUM SERPL-MCNC: 8.6 MG/DL — SIGNIFICANT CHANGE UP (ref 8.4–10.5)
CALCIUM SERPL-MCNC: 9.3 MG/DL — SIGNIFICANT CHANGE UP (ref 8.4–10.5)
CHLORIDE SERPL-SCNC: 106 MMOL/L — SIGNIFICANT CHANGE UP (ref 96–108)
CHLORIDE SERPL-SCNC: 109 MMOL/L — HIGH (ref 96–108)
CO2 SERPL-SCNC: 22 MMOL/L — SIGNIFICANT CHANGE UP (ref 22–31)
CO2 SERPL-SCNC: 22 MMOL/L — SIGNIFICANT CHANGE UP (ref 22–31)
CORTIS AM PEAK SERPL-MCNC: 14.07 UG/DL — SIGNIFICANT CHANGE UP (ref 6.02–18.4)
CREAT SERPL-MCNC: 0.87 MG/DL — SIGNIFICANT CHANGE UP (ref 0.5–1.3)
CREAT SERPL-MCNC: 0.91 MG/DL — SIGNIFICANT CHANGE UP (ref 0.5–1.3)
EGFR: 90 ML/MIN/1.73M2 — SIGNIFICANT CHANGE UP
EGFR: 92 ML/MIN/1.73M2 — SIGNIFICANT CHANGE UP
GAS PNL BLDA: SIGNIFICANT CHANGE UP
GLUCOSE SERPL-MCNC: 106 MG/DL — HIGH (ref 70–99)
GLUCOSE SERPL-MCNC: 110 MG/DL — HIGH (ref 70–99)
HCT VFR BLD CALC: 34.4 % — LOW (ref 39–50)
HCT VFR BLD CALC: 37 % — LOW (ref 39–50)
HGB BLD-MCNC: 11.8 G/DL — LOW (ref 13–17)
HGB BLD-MCNC: 12.7 G/DL — LOW (ref 13–17)
INR BLD: 1.12 — SIGNIFICANT CHANGE UP (ref 0.88–1.16)
INR BLD: 1.12 — SIGNIFICANT CHANGE UP (ref 0.88–1.16)
MAGNESIUM SERPL-MCNC: 2 MG/DL — SIGNIFICANT CHANGE UP (ref 1.6–2.6)
MAGNESIUM SERPL-MCNC: 2 MG/DL — SIGNIFICANT CHANGE UP (ref 1.6–2.6)
MCHC RBC-ENTMCNC: 32 PG — SIGNIFICANT CHANGE UP (ref 27–34)
MCHC RBC-ENTMCNC: 32.2 PG — SIGNIFICANT CHANGE UP (ref 27–34)
MCHC RBC-ENTMCNC: 34.3 GM/DL — SIGNIFICANT CHANGE UP (ref 32–36)
MCHC RBC-ENTMCNC: 34.3 GM/DL — SIGNIFICANT CHANGE UP (ref 32–36)
MCV RBC AUTO: 93.2 FL — SIGNIFICANT CHANGE UP (ref 80–100)
MCV RBC AUTO: 93.7 FL — SIGNIFICANT CHANGE UP (ref 80–100)
NRBC # BLD: 0 /100 WBCS — SIGNIFICANT CHANGE UP (ref 0–0)
NRBC # BLD: 0 /100 WBCS — SIGNIFICANT CHANGE UP (ref 0–0)
PHOSPHATE SERPL-MCNC: 3.3 MG/DL — SIGNIFICANT CHANGE UP (ref 2.5–4.5)
PHOSPHATE SERPL-MCNC: 3.3 MG/DL — SIGNIFICANT CHANGE UP (ref 2.5–4.5)
PLATELET # BLD AUTO: 199 K/UL — SIGNIFICANT CHANGE UP (ref 150–400)
PLATELET # BLD AUTO: 201 K/UL — SIGNIFICANT CHANGE UP (ref 150–400)
POTASSIUM SERPL-MCNC: 4.2 MMOL/L — SIGNIFICANT CHANGE UP (ref 3.5–5.3)
POTASSIUM SERPL-MCNC: 4.2 MMOL/L — SIGNIFICANT CHANGE UP (ref 3.5–5.3)
POTASSIUM SERPL-SCNC: 4.2 MMOL/L — SIGNIFICANT CHANGE UP (ref 3.5–5.3)
POTASSIUM SERPL-SCNC: 4.2 MMOL/L — SIGNIFICANT CHANGE UP (ref 3.5–5.3)
PROT SERPL-MCNC: 6.3 G/DL — SIGNIFICANT CHANGE UP (ref 6–8.3)
PROT SERPL-MCNC: 7.1 G/DL — SIGNIFICANT CHANGE UP (ref 6–8.3)
PROTHROM AB SERPL-ACNC: 13.3 SEC — SIGNIFICANT CHANGE UP (ref 10.5–13.4)
PROTHROM AB SERPL-ACNC: 13.4 SEC — SIGNIFICANT CHANGE UP (ref 10.5–13.4)
RBC # BLD: 3.67 M/UL — LOW (ref 4.2–5.8)
RBC # BLD: 3.97 M/UL — LOW (ref 4.2–5.8)
RBC # FLD: 13.1 % — SIGNIFICANT CHANGE UP (ref 10.3–14.5)
RBC # FLD: 13.3 % — SIGNIFICANT CHANGE UP (ref 10.3–14.5)
SODIUM SERPL-SCNC: 139 MMOL/L — SIGNIFICANT CHANGE UP (ref 135–145)
SODIUM SERPL-SCNC: 139 MMOL/L — SIGNIFICANT CHANGE UP (ref 135–145)
WBC # BLD: 8.85 K/UL — SIGNIFICANT CHANGE UP (ref 3.8–10.5)
WBC # BLD: 9.29 K/UL — SIGNIFICANT CHANGE UP (ref 3.8–10.5)
WBC # FLD AUTO: 8.85 K/UL — SIGNIFICANT CHANGE UP (ref 3.8–10.5)
WBC # FLD AUTO: 9.29 K/UL — SIGNIFICANT CHANGE UP (ref 3.8–10.5)

## 2022-10-12 PROCEDURE — 99232 SBSQ HOSP IP/OBS MODERATE 35: CPT

## 2022-10-12 PROCEDURE — 99291 CRITICAL CARE FIRST HOUR: CPT

## 2022-10-12 PROCEDURE — 99233 SBSQ HOSP IP/OBS HIGH 50: CPT

## 2022-10-12 PROCEDURE — 71045 X-RAY EXAM CHEST 1 VIEW: CPT | Mod: 26

## 2022-10-12 PROCEDURE — 99292 CRITICAL CARE ADDL 30 MIN: CPT

## 2022-10-12 PROCEDURE — 99222 1ST HOSP IP/OBS MODERATE 55: CPT

## 2022-10-12 RX ORDER — CALCIUM GLUCONATE 100 MG/ML
2 VIAL (ML) INTRAVENOUS ONCE
Refills: 0 | Status: COMPLETED | OUTPATIENT
Start: 2022-10-12 | End: 2022-10-12

## 2022-10-12 RX ORDER — ALBUMIN HUMAN 25 %
50 VIAL (ML) INTRAVENOUS ONCE
Refills: 0 | Status: DISCONTINUED | OUTPATIENT
Start: 2022-10-12 | End: 2022-10-14

## 2022-10-12 RX ORDER — DOPAMINE HYDROCHLORIDE 40 MG/ML
1.5 INJECTION, SOLUTION, CONCENTRATE INTRAVENOUS
Qty: 400 | Refills: 0 | Status: DISCONTINUED | OUTPATIENT
Start: 2022-10-12 | End: 2022-10-13

## 2022-10-12 RX ORDER — MIDODRINE HYDROCHLORIDE 2.5 MG/1
10 TABLET ORAL EVERY 8 HOURS
Refills: 0 | Status: DISCONTINUED | OUTPATIENT
Start: 2022-10-12 | End: 2022-10-13

## 2022-10-12 RX ADMIN — ATORVASTATIN CALCIUM 40 MILLIGRAM(S): 80 TABLET, FILM COATED ORAL at 21:35

## 2022-10-12 RX ADMIN — SODIUM CHLORIDE 3 MILLILITER(S): 9 INJECTION INTRAMUSCULAR; INTRAVENOUS; SUBCUTANEOUS at 21:22

## 2022-10-12 RX ADMIN — MIDODRINE HYDROCHLORIDE 10 MILLIGRAM(S): 2.5 TABLET ORAL at 17:06

## 2022-10-12 RX ADMIN — DOPAMINE HYDROCHLORIDE 4.02 MICROGRAM(S)/KG/MIN: 40 INJECTION, SOLUTION, CONCENTRATE INTRAVENOUS at 03:48

## 2022-10-12 RX ADMIN — CHLORHEXIDINE GLUCONATE 1 APPLICATION(S): 213 SOLUTION TOPICAL at 05:12

## 2022-10-12 RX ADMIN — SENNA PLUS 2 TABLET(S): 8.6 TABLET ORAL at 21:36

## 2022-10-12 RX ADMIN — Medication 200 GRAM(S): at 04:41

## 2022-10-12 RX ADMIN — SODIUM CHLORIDE 3 MILLILITER(S): 9 INJECTION INTRAMUSCULAR; INTRAVENOUS; SUBCUTANEOUS at 05:12

## 2022-10-12 RX ADMIN — MIDODRINE HYDROCHLORIDE 10 MILLIGRAM(S): 2.5 TABLET ORAL at 10:33

## 2022-10-12 RX ADMIN — CLOPIDOGREL BISULFATE 75 MILLIGRAM(S): 75 TABLET, FILM COATED ORAL at 13:46

## 2022-10-12 RX ADMIN — SODIUM CHLORIDE 3 MILLILITER(S): 9 INJECTION INTRAMUSCULAR; INTRAVENOUS; SUBCUTANEOUS at 13:48

## 2022-10-12 RX ADMIN — PANTOPRAZOLE SODIUM 40 MILLIGRAM(S): 20 TABLET, DELAYED RELEASE ORAL at 05:54

## 2022-10-12 NOTE — CONSULT NOTE ADULT - SUBJECTIVE AND OBJECTIVE BOX
Patient is a 72y old  Male who presents with a chief complaint of Coronary Artery Disease (12 Oct 2022 08:22)      HPI:  Patient is a 72-year-old male nicotine dependence who has not followed with physicians regularly presented to the emergency department with complaint of substernal chest pain radiating to bilateral  arms. Pt ruled-in NSTEMI and had a cardiac cath with 3V CAD.Patient reports he had initial episode on 10/3 upon awakening. It lasted approximately 15 minutes patient took aspirin at home and resolved. Patient had another episode of chest pain   radiating to bilateral arms this morning lasting approximately 15 minutes again took a dose of aspirin. Reoccurred yesterday afternoon and presented to the emergency department for further   evaluation. In the ER patient was found to have inverted T waves in the lateral leads and markedly elevated troponin. Patient was chest pain-free upon arrival in the ER. Emergency department spoke   with interventional cardiology who recommend loading the patient with Plavix starting on heparin drip for planned cardiac catheterization. Patient denies any associated neck or jaw pain   with his chest pain symptoms. Patient denies any cardiac evaluation in the past. He denies any family history of heart disease. Cardiac cath showed (90% mLAD, 95% pLCX, 75% mRCA).  Denies GI bleeding, stroke, melena, varicosities.     (07 Oct 2022 18:34)      PAST MEDICAL & SURGICAL HISTORY:  H/O inguinal hernia repair          Review of system:  As per HPI    No Known Allergies      FAMILY HISTORY:        Vital Signs Last 24 Hrs  T(C): 36.4 (12 Oct 2022 05:01), Max: 36.7 (11 Oct 2022 17:06)  T(F): 97.5 (12 Oct 2022 05:01), Max: 98 (11 Oct 2022 17:06)  HR: 44 (12 Oct 2022 08:00) (37 - 60)  BP: 122/63 (12 Oct 2022 08:00) (94/52 - 128/60)  BP(mean): 70 (12 Oct 2022 06:00) (70 - 87)  RR: 16 (12 Oct 2022 08:00) (14 - 18)  SpO2: 97% (12 Oct 2022 08:00) (95% - 100%)    Parameters below as of 12 Oct 2022 08:00  Patient On (Oxygen Delivery Method): room air       I&O's Detail    11 Oct 2022 07:01  -  12 Oct 2022 07:00  --------------------------------------------------------  IN:    DOBUTamine: 6.4 mL    DOPamine Infusion: 46.6 mL    Heparin: 183 mL    IV PiggyBack: 100 mL    Oral Fluid: 620 mL    Phenylephrine: 367.2 mL  Total IN: 1323.2 mL    OUT:    Voided (mL): 1790 mL  Total OUT: 1790 mL    Total NET: -466.8 mL      12 Oct 2022 07:01  -  12 Oct 2022 08:31  --------------------------------------------------------  IN:    DOPamine Infusion: 13.3 mL    Heparin: 9.5 mL    Phenylephrine: 5.3 mL  Total IN: 28.1 mL    OUT:  Total OUT: 0 mL    Total NET: 28.1 mL        Daily     Daily     Physical Exam:   GEN: NAD, AAOx3  HEENT: MMM, no icterus  CV: S1 S2 RRR, no MRG  Lung: CTAB  Abd: soft NT ND +BS  Ext: no c/c/e  Neuro: no focal neuro deficit    MEDICATIONS  (STANDING):  atorvastatin 40 milliGRAM(s) Oral at bedtime  chlorhexidine 2% Cloths 1 Application(s) Topical <User Schedule>  clopidogrel Tablet 75 milliGRAM(s) Oral daily  DOPamine Infusion 1.5 MICROgram(s)/kG/Min (4.02 mL/Hr) IV Continuous <Continuous>  heparin  Infusion 850 Unit(s)/Hr (9.5 mL/Hr) IV Continuous <Continuous>  influenza  Vaccine (HIGH DOSE) 0.7 milliLiter(s) IntraMuscular once  pantoprazole    Tablet 40 milliGRAM(s) Oral before breakfast  phenylephrine    Infusion 0.1 MICROgram(s)/kG/Min (2.68 mL/Hr) IV Continuous <Continuous>  senna 2 Tablet(s) Oral at bedtime  sodium chloride 0.9% lock flush 3 milliLiter(s) IV Push every 8 hours      TELEMETRY:  no events    Echo:  < from: TTE Echo w/ Contrast Follow Up Limited (10.10.22 @ 15:39) >  CONCLUSIONS:     1. Left ventricular systolic function is moderately reduced with a   calculated ejection fraction of 35% with regional wall motion   abnormalities. No obvious LV thrombus seen.   2. Multiple segmental abnormalities exist. See findings.   3. Limited study obtained for evaluation of LV thrombus.   4. Compared to the previous TTE performed on 10/8/2022, there has been   interval improvement in LV systolic function.    < end of copied text >      Cath: Cardiac cath showed (90% mLAD, 95% pLCX, 75% mRCA) and reduced LVEF.    LABS:                        11.8   8.85  )-----------( 201      ( 12 Oct 2022 03:10 )             34.4     10-12    139  |  109<H>  |  11  ----------------------------<  110<H>  4.2   |  22  |  0.91    Ca    8.6      12 Oct 2022 03:10  Phos  3.3     10-12  Mg     2.0     10-12    TPro  6.3  /  Alb  3.6  /  TBili  0.9  /  DBili  x   /  AST  17  /  ALT  15  /  AlkPhos  67  10-12        PT/INR - ( 12 Oct 2022 03:10 )   PT: 13.4 sec;   INR: 1.12          PTT - ( 12 Oct 2022 03:10 )  PTT:66.3 sec      RADIOLOGY & ADDITIONAL STUDIES:      Plan:  Cardiovascular-     Patient is a 72y old  Male who presents with a chief complaint of Coronary Artery Disease (12 Oct 2022 08:22)    HPI:  Patient is a 72-year-old male nicotine dependence who has not followed with physicians regularly presented to the emergency department with complaint of substernal chest pain radiating to bilateral  arms. Pt ruled-in NSTEMI and had a cardiac cath with 3V CAD.  He was transferred to [facility] for CABG. However on Sunday pt had large CVA requiring thrombectomy. Since then, neuro status much improved.    Currently, denies CP/SOB.  Seen on 9E, sitting in chair with no complaints     (07 Oct 2022 18:34)      PAST MEDICAL & SURGICAL HISTORY:  H/O inguinal hernia repair          Review of system:  As per HPI    No Known Allergies      FAMILY HISTORY:        Vital Signs Last 24 Hrs  T(C): 36.4 (12 Oct 2022 05:01), Max: 36.7 (11 Oct 2022 17:06)  T(F): 97.5 (12 Oct 2022 05:01), Max: 98 (11 Oct 2022 17:06)  HR: 44 (12 Oct 2022 08:00) (37 - 60)  BP: 122/63 (12 Oct 2022 08:00) (94/52 - 128/60)  BP(mean): 70 (12 Oct 2022 06:00) (70 - 87)  RR: 16 (12 Oct 2022 08:00) (14 - 18)  SpO2: 97% (12 Oct 2022 08:00) (95% - 100%)    Parameters below as of 12 Oct 2022 08:00  Patient On (Oxygen Delivery Method): room air       I&O's Detail    11 Oct 2022 07:01  -  12 Oct 2022 07:00  --------------------------------------------------------  IN:    DOBUTamine: 6.4 mL    DOPamine Infusion: 46.6 mL    Heparin: 183 mL    IV PiggyBack: 100 mL    Oral Fluid: 620 mL    Phenylephrine: 367.2 mL  Total IN: 1323.2 mL    OUT:    Voided (mL): 1790 mL  Total OUT: 1790 mL    Total NET: -466.8 mL      12 Oct 2022 07:01  -  12 Oct 2022 08:31  --------------------------------------------------------  IN:    DOPamine Infusion: 13.3 mL    Heparin: 9.5 mL    Phenylephrine: 5.3 mL  Total IN: 28.1 mL    OUT:  Total OUT: 0 mL    Total NET: 28.1 mL        Daily     Daily     Physical Exam:   GEN: NAD, AAOx3  HEENT: MMM, no icterus  CV: S1 S2 RRR, no MRG  Lung: CTAB  Abd: soft NT ND +BS  Ext: no c/c/e  Neuro: no focal neuro deficit    MEDICATIONS  (STANDING):  atorvastatin 40 milliGRAM(s) Oral at bedtime  chlorhexidine 2% Cloths 1 Application(s) Topical <User Schedule>  clopidogrel Tablet 75 milliGRAM(s) Oral daily  DOPamine Infusion 1.5 MICROgram(s)/kG/Min (4.02 mL/Hr) IV Continuous <Continuous>  heparin  Infusion 850 Unit(s)/Hr (9.5 mL/Hr) IV Continuous <Continuous>  influenza  Vaccine (HIGH DOSE) 0.7 milliLiter(s) IntraMuscular once  pantoprazole    Tablet 40 milliGRAM(s) Oral before breakfast  phenylephrine    Infusion 0.1 MICROgram(s)/kG/Min (2.68 mL/Hr) IV Continuous <Continuous>  senna 2 Tablet(s) Oral at bedtime  sodium chloride 0.9% lock flush 3 milliLiter(s) IV Push every 8 hours      TELEMETRY:  no events    Echo:  < from: TTE Echo w/ Contrast Follow Up Limited (10.10.22 @ 15:39) >  CONCLUSIONS:     1. Left ventricular systolic function is moderately reduced with a   calculated ejection fraction of 35% with regional wall motion   abnormalities. No obvious LV thrombus seen.   2. Multiple segmental abnormalities exist. See findings.   3. Limited study obtained for evaluation of LV thrombus.   4. Compared to the previous TTE performed on 10/8/2022, there has been   interval improvement in LV systolic function.    < end of copied text >      Cath: Cardiac cath showed (90% mLAD, 95% pLCX, 75% mRCA) and reduced LVEF.    LABS:                        11.8   8.85  )-----------( 201      ( 12 Oct 2022 03:10 )             34.4     10-12    139  |  109<H>  |  11  ----------------------------<  110<H>  4.2   |  22  |  0.91    Ca    8.6      12 Oct 2022 03:10  Phos  3.3     10-12  Mg     2.0     10-12    TPro  6.3  /  Alb  3.6  /  TBili  0.9  /  DBili  x   /  AST  17  /  ALT  15  /  AlkPhos  67  10-12        PT/INR - ( 12 Oct 2022 03:10 )   PT: 13.4 sec;   INR: 1.12          PTT - ( 12 Oct 2022 03:10 )  PTT:66.3 sec      RADIOLOGY & ADDITIONAL STUDIES:

## 2022-10-12 NOTE — PROGRESS NOTE ADULT - SUBJECTIVE AND OBJECTIVE BOX
**STROKE CODE CONSULT NOTE**    Last known well time: 17:10    HPI:Patient is a 72-year-old male with nicotine dependence who has not followed with physicians regularly, presented to the emergency department with complaint of substernal chest pain radiating to bilateral  arms. Patient reports he had initial episode on 10/3 upon awakening. It lasted approximately 15 minutes patient took aspirin at home and resolved. Patient had another episode of chest pain   radiating to bilateral arms this morning lasting approximately 15 minutes again took a dose of aspirin. SImilar event reoccurred the next afternoon and he decided to present to the emergency department for further evaluation.   In the ER patient was found to have inverted T waves in the lateral leads and markedly elevated troponin. Patient was chest pain-free upon arrival in the ER. Emergency department spoke   with interventional cardiology who recommend loading the patient with Plavix and starting him on heparin drip for planned cardiac catheterization. Cardiac cath showed (90% mLAD, 95% pLCX, 75% mRCA). He was planned to undergo bypass this week.   Stroke code was called on 10/9 after patient was found on the floor with a large right forehead laceration, aphasic, and with right sided weakness. He was brought to [facility] which was negative for hemorrhage but demonstrated a hyperdensity within the proximal L MCA consistent with likely MCA thrombus. CTP significant for 300cc mismatch in L MCA territory. CTA head and neck performed with L MCA occlusion at the origin with absence of flow also noted involving the proximal portion of the left A1 segment.   T(C): 37.1 (10-09-22 @ 18:04), Max: 37.2 (10-08-22 @ 21:10)  HR: 52 (10-09-22 @ 18:15) (46 - 60)  BP: 128/75 (10-09-22 @ 17:40) (101/55 - 152/67)  RR: 18 (10-09-22 @ 18:15) (14 - 18)  SpO2: 100% (10-09-22 @ 18:15) (91% - 100%)    PAST MEDICAL & SURGICAL HISTORY:  H/O inguinal hernia repair          FAMILY HISTORY:    ROS:   Unable to obtain 2/2 aphasia    MEDICATIONS  (STANDING):  aspirin  chewable 81 milliGRAM(s) Oral daily  atorvastatin 40 milliGRAM(s) Oral at bedtime  heparin  Infusion 850 Unit(s)/Hr (8.5 mL/Hr) IV Continuous <Continuous>  influenza  Vaccine (HIGH DOSE) 0.7 milliLiter(s) IntraMuscular once  pantoprazole    Tablet 40 milliGRAM(s) Oral before breakfast  phenylephrine    Infusion 0.1 MICROgram(s)/kG/Min (2.68 mL/Hr) IV Continuous <Continuous>  senna 2 Tablet(s) Oral at bedtime  sodium chloride 0.9% lock flush 3 milliLiter(s) IV Push every 8 hours    MEDICATIONS  (PRN):  acetaminophen     Tablet .. 650 milliGRAM(s) Oral every 6 hours PRN Temp greater or equal to 38C (100.4F), Mild Pain (1 - 3)    Allergies    No Known Allergies    Intolerances      Vital Signs Last 24 Hrs  T(C): 37.1 (09 Oct 2022 18:04), Max: 37.2 (08 Oct 2022 21:10)  T(F): 98.8 (09 Oct 2022 18:04), Max: 98.9 (08 Oct 2022 21:10)  HR: 52 (09 Oct 2022 18:15) (46 - 60)  BP: 128/75 (09 Oct 2022 17:40) (101/55 - 152/67)  BP(mean): 90 (09 Oct 2022 17:40) (73 - 94)  RR: 18 (09 Oct 2022 18:15) (14 - 18)  SpO2: 100% (09 Oct 2022 18:15) (91% - 100%)    Parameters below as of 09 Oct 2022 18:15  Patient On (Oxygen Delivery Method): room air      Neurologic:  -Mental status: Awake, alert, globally aphasic, not following commands.   -Cranial nerves:   II: Visual fields are full to confrontation.  III, IV, VI: Left gaze preference, able to reach midline. Pupils equally round and reactive to light  VII: R facial droop  Motor: Right upper extremity 0/5, right lower extremity 2/5. Left upper extremity 4/5, left lower extremity at least 3/5.   Sensation: Grossly intact, grimaces to noxious stimuli applied to right side.    NIHSS: 21    Fingerstick Blood Glucose: CAPILLARY BLOOD GLUCOSE        LABS:                        13.4   6.92  )-----------( 195      ( 09 Oct 2022 05:30 )             39.9     10-    141  |  107  |  11  ----------------------------<  87  3.9   |  22  |  1.09    Ca    9.1      09 Oct 2022 05:30  Phos  3.5     10  Mg     1.9     10-09    TPro  6.4  /  Alb  3.7  /  TBili  1.1  /  DBili  x   /  AST  18  /  ALT  12  /  AlkPhos  76  10-08    PT/INR - ( 08 Oct 2022 05:30 )   PT: 12.4 sec;   INR: 1.04          PTT - ( 09 Oct 2022 16:15 )  PTT:32.6 sec  CARDIAC MARKERS ( 08 Oct 2022 20:27 )  x     / 0.32 ng/mL / x     / x     / x      CARDIAC MARKERS ( 08 Oct 2022 05:30 )  x     / 0.34 ng/mL / x     / x     / x          Urinalysis Basic - ( 08 Oct 2022 04:34 )    Color: Yellow / Appearance: Clear / S.020 / pH: x  Gluc: x / Ketone: NEGATIVE  / Bili: Negative / Urobili: 0.2 E.U./dL   Blood: x / Protein: NEGATIVE mg/dL / Nitrite: NEGATIVE   Leuk Esterase: NEGATIVE / RBC: < 5 /HPF / WBC < 5 /HPF   Sq Epi: x / Non Sq Epi: 0-5 /HPF / Bacteria: Present /HPF        RADIOLOGY & ADDITIONAL STUDIES:  < from: CT Brain Stroke Protocol (10.09.22 @ 17:59) >  IMPRESSION: Hyperdensity within the proximal left MCA consistent with   likely MCA thrombus, no acute intracranial hemorrhage.    < end of copied text >    < from: CT Perfusion w/ Maps w/ IV Cont (10.09.22 @ 18:00) >  The CT perfusion study demonstrates a large near hemispheric   area of elevated MTT involving the left frontal, temporal and parietal   lobe without corresponding decrease in both CBV or CBF. The calculated   RAPID CBF volume < 30% is 0 ml and a Tmax volume > 6 seconds is 300 ml   meaning the mismatch volume is 300 ml and mismatch ratio is infinite.    < end of copied text >      -----------------------------------------------------------------------------------------------------------------  IV-tPA (Y/N):  N                           Reason IV-tPA not given: head trauma

## 2022-10-12 NOTE — PROGRESS NOTE ADULT - NS ATTEND AMEND GEN_ALL_CORE FT
Patient is a 72-year-old gentleman admitted for CABG preop work-up.  Stroke code called 10/9 after patient developed L MCA syndrome s/p successful thrombectomy. Repeat HCT shows small volume of stroke and patient has improved significantly, essentially back ot baseline. Etiology of stroke unclear - possibly related to Low EF/LV thrombus but no LV thrombus was seen on TTE with contrast the day prior or repeat TTE 10/10. A fib has not been detected here and he has no significant large vessel stenosis.  Recommended THAO. Per cardiology, will attempt cardiac MR instead. Per cards, patient needsIV Heparin for severe CAD as well as Plavix. We rec lowest effective ptt goal. Continue statin. Plan to defer PCI til next week given patient is stable from a cardiac standpoint and repeat HCT Mon/Tues with plan for PCI Tues/Wed.

## 2022-10-12 NOTE — CONSULT NOTE ADULT - ASSESSMENT
73 y/o M with new systolic heart failure, NSTEMI w/ 3V CAD, new acute CVA s/p thrombectomy, head trauma from fall  -heart-team discussion with myself, Dr. Collins and rest of team-> given new CVA, pt not a good candidate for CABG. We discussed options, and given NSTEMI/CHF will plan for high-risk multi-vessel PCI.  -we spoke with neurology, and given head trauma and CVA- would like to postpone PCI until next week if pt stable  -hypotensive on phenylepherine right now-> feels warm on exam, does not seem like hypotension from CHF (euvolemic on exam). Started on midodrine with improvement  -continue heparin gtt and plavix for recent NSTEMI/CVA  -CHF consultation  -cMRI to evaluate LV thrombus and viability  -ICU level of care  -discussed case with pt/daughter, heart-team and neuro

## 2022-10-12 NOTE — PROGRESS NOTE ADULT - ASSESSMENT
Patient is a 72-year-old male with nicotine dependence who has not followed with physicians regularly, presented to the emergency department with complaint of substernal chest pain radiating to bilateral arms, planned to undergo cardiac bypass this week. Stroke code was called on 10/9 after patient was found on the floor with a large right forehead laceration, aphasic, and with right sided weakness. He was brought to [facility] which was negative for hemorrhage but demonstrated a hyperdensity within the proximal L MCA consistent with likely MCA thrombus. CTP significant for 300cc mismatch in L MCA territory. CTA head and neck performed with L MCA occlusion at the origin with absence of flow also noted involving the proximal portion of the left A1 segment. NIHSS 21. Patient deemed not a tPA candidate 2/2 head trauma.     - patient taken to thrombectomy with Dr. Sosa  - Repeat CT head with any acute change in mental status.  -AC ok with neurology if its ok with neurosurgery - low goal no bolus  -PCI ok with neurology if ok with Neruology   -THAO/ or cardiac MRI for LA thrombus and loop recommended on discharge        Stroke team will continue to follow. Discussed with Dr. Moore

## 2022-10-12 NOTE — CONSULT NOTE ADULT - ATTENDING COMMENTS
72/M chronic smoker not seen a doctor in many years admitted with CP and found to have NSTEMI. [facility] with 3 vesseal CAD(90% mid LAD, 95% Prox CX, 85% RCA disease) and was planned for CABG. TTE with EF 15-20%, LVEDD 5.8cms, Normal RV, Mod MR, NO LV thrombus.   Pt suffered CVA and had a fall with laceration to scalp. Pt had thrombectomy of MCA with resolution of the weakness.     Pt denies any symptoms of decompensated HF in the past.     exam: euvolemic  no edema  s1s2+ RRR  b/l clear BS  Abdomen soft  no LE edema    Labs:                        12.7   9.29  )-----------( 199      ( 12 Oct 2022 10:37 )             37.0   10-12    139  |  106  |  10  ----------------------------<  106<H>  4.2   |  22  |  0.87    Ca    9.3      12 Oct 2022 10:37  Phos  3.3     10-12  Mg     2.0     10-12    TPro  7.1  /  Alb  4.1  /  TBili  1.0  /  DBili  x   /  AST  20  /  ALT  15  /  AlkPhos  72  10-12    Imp:  HFrEF   3V CAD  CVA  Smoker    Plan:  Cont antiplatelets and statin  will wean dopamine and Phenylephrine   will consider starting hina and SGLT2i  SBP borderline low  will wait to start BB and ARB/ARNI  Plan for revascularization --> high risk for CABG and plan for high risk PCI  will discuss with structural team  d/w CTICU team  discussed with pt and his son about plan

## 2022-10-12 NOTE — DIETITIAN INITIAL EVALUATION ADULT - PERTINENT MEDS FT
MEDICATIONS  (STANDING):  atorvastatin 40 milliGRAM(s) Oral at bedtime  chlorhexidine 2% Cloths 1 Application(s) Topical <User Schedule>  clopidogrel Tablet 75 milliGRAM(s) Oral daily  DOPamine Infusion 1.5 MICROgram(s)/kG/Min (4.02 mL/Hr) IV Continuous <Continuous>  heparin  Infusion 850 Unit(s)/Hr (9.5 mL/Hr) IV Continuous <Continuous>  influenza  Vaccine (HIGH DOSE) 0.7 milliLiter(s) IntraMuscular once  midodrine 10 milliGRAM(s) Oral every 8 hours  pantoprazole    Tablet 40 milliGRAM(s) Oral before breakfast  phenylephrine    Infusion 0.1 MICROgram(s)/kG/Min (2.68 mL/Hr) IV Continuous <Continuous>  senna 2 Tablet(s) Oral at bedtime  sodium chloride 0.9% lock flush 3 milliLiter(s) IV Push every 8 hours    MEDICATIONS  (PRN):  acetaminophen     Tablet .. 650 milliGRAM(s) Oral every 6 hours PRN Temp greater or equal to 38C (100.4F), Mild Pain (1 - 3)

## 2022-10-12 NOTE — DIETITIAN INITIAL EVALUATION ADULT - OTHER INFO
Pt is a 71 yo male presented to the emergency department with complaint of substernal chest pain radiating to bilateral arms, planned to undergo cardiac bypass this week. Stroke code was called on 10/9 after patient was found on the floor with a large right forehead laceration, aphasic, and with right sided weakness. He was brought to [facility] which was negative for hemorrhage but demonstrated a hyperdensity within the proximal L MCA consistent with likely MCA thrombus; s/p cerebral angiogram via R CFA for L M1 mechanical thrombectomy 10/9/2022.    Pt seen at bedside for initial assessment-on room air, pressor support (phenylephrine)- MAP 72, on heparin and dopamine drips. No n/v/d/c documented at this time. Last documented bowel movement 10/10. Confirms NKFA. Denies change in appetite PTA; endorses 3 meals/day at home. Reports usual body weight  175 pounds (dosing weight 157 pounds). RD discussed weight discrepancy, however, pt unclear if weight loss present or not. Unable to obtain bed weight as pt out of bed in chair; will continue to monitor weight changes/trends to assess if true weight loss. No edema documented at this time. No pressure ulcers documented at this time. Surgical incisions per chart. Noted w/ multiple wounds. Vahe score=18. Labs reviewed 10/12; electrolytes within normal limits at this time. Observed pt with no overt signs of muscle or fat wasting. Based on ASPEN guidelines, pt does not meet criteria for malnutrition at this time, will monitor risk. Provided pt with diet education regarding importance of meeting nutritional needs post operatively ; amenable to education. Discussed current diet order Soft and Bite Sized DASH/TLC; provided diet education, discussed sources of high versus low fat and low sodium foods; ? if pt understood/will comply to diet education (appeared confused at times). Pt reports disliking current diet order; able to complete cereal and coffee for breakfast. No cultural, ethnic, Sabianist food preferences noted. Made aware RD remains available. RD to follow up. See nutrition recommendations below.

## 2022-10-12 NOTE — PROGRESS NOTE ADULT - SUBJECTIVE AND OBJECTIVE BOX
CTICU  CRITICAL  CARE  attending     Hand off received 					   Pertinent clinical, laboratory, radiographic, hemodynamic, echocardiographic, respiratory data, microbiologic data and chart were reviewed and analyzed frequently throughout the course of the day and night      72 years old male nicotine dependence who has not followed with physicians regularly  He presented to the emergency department with complaint of substernal chest pain radiating to bilateral arms.  Patient reports he had initial episode on 10/3 upon awakening. It lasted approximately 15 minutes patient took aspirin at home and resolved. Patient had another episode of chest pain   radiating to bilateral arms this morning lasting approximately 15 minutes again took a dose of aspirin.   He presented to the emergency department for further evaluation.   In the ER patient was found to have inverted T waves in the lateral leads and markedly elevated troponin. Patient was chest pain-free upon arrival in the ER.   Emergency department spoke with interventional cardiology who recommend loading the patient with Plavix starting on heparin drip for planned cardiac catheterization.     Cardiac cath showed (90% mLAD, 95% pLCX, 75% mRCA).   He was waiting for CABG.  He fell on the floor after a syncopal episode.  Work up showed thrombus in the left middle cerebral artery.  S/P Aspiration thrombectomy.        14 system review was unremarkable    Vital signs, hemodynamic and respiratory parameters were reviewed from the bedside nursing flow sheet.  ICU Vital Signs Last 24 Hrs  T(C): 36.1 (13 Oct 2022 01:01), Max: 36.8 (12 Oct 2022 14:12)  T(F): 97 (13 Oct 2022 01:01), Max: 98.3 (12 Oct 2022 22:34)  HR: 49 (13 Oct 2022 03:00) (35 - 58)  BP: 113/53 (13 Oct 2022 03:00) (94/52 - 127/62)  BP(mean): 77 (13 Oct 2022 03:00) (70 - 87)  ABP: 86/51 (12 Oct 2022 17:00) (81/64 - 115/70)  ABP(mean): 66 (12 Oct 2022 17:00) (65 - 93)  RR: 18 (13 Oct 2022 03:00) (14 - 18)  SpO2: 99% (13 Oct 2022 03:00) (95% - 100%)    O2 Parameters below as of 13 Oct 2022 04:00  Patient On (Oxygen Delivery Method): room air          Adult Advanced Hemodynamics Last 24 Hrs  CVP(mm Hg): --  CVP(cm H2O): --  CO: --  CI: --  PA: --  PA(mean): --  PCWP: --  SVR: --  SVRI: --  PVR: --  PVRI: --, ABG - ( 12 Oct 2022 03:10 )  pH, Arterial: 7.40  pH, Blood: x     /  pCO2: 30    /  pO2: 92    / HCO3: 19    / Base Excess: -5.0  /  SaO2: 98.7                Intake and output was reviewed and the fluid balance was calculated  Daily     Daily   I&O's Summary    11 Oct 2022 07:01  -  12 Oct 2022 07:00  --------------------------------------------------------  IN: 1323.2 mL / OUT: 1790 mL / NET: -466.8 mL    12 Oct 2022 07:01  -  13 Oct 2022 04:23  --------------------------------------------------------  IN: 549.7 mL / OUT: 2000 mL / NET: -1450.3 mL        All lines and drain sites were assessed    Neuro: Improvement in aphasia and dysarthria. Good strength and coordination.  Neck: No JVD.  CVS: S1, S1, No S3.  Lungs: Good air entry bilaterally.  Abd: Soft. No tenderness. + Bowel sounds.  Vascular: + DP/PT.  Extremities: No edema.  Lymphatic: Normal.  Skin: No abnormalities.      labs  CBC Full  -  ( 13 Oct 2022 02:33 )  WBC Count : 7.92 K/uL  RBC Count : 3.98 M/uL  Hemoglobin : 12.7 g/dL  Hematocrit : 37.2 %  Platelet Count - Automated : 198 K/uL  Mean Cell Volume : 93.5 fl  Mean Cell Hemoglobin : 31.9 pg  Mean Cell Hemoglobin Concentration : 34.1 gm/dL  Auto Neutrophil # : x  Auto Lymphocyte # : x  Auto Monocyte # : x  Auto Eosinophil # : x  Auto Basophil # : x  Auto Neutrophil % : x  Auto Lymphocyte % : x  Auto Monocyte % : x  Auto Eosinophil % : x  Auto Basophil % : x    10-13    139  |  107  |  12  ----------------------------<  99  4.4   |  23  |  1.01    Ca    9.0      13 Oct 2022 02:33  Phos  3.7     10-13  Mg     1.9     10-13    TPro  6.4  /  Alb  3.6  /  TBili  1.1  /  DBili  x   /  AST  18  /  ALT  16  /  AlkPhos  69  10-13    PT/INR - ( 13 Oct 2022 02:33 )   PT: 13.3 sec;   INR: 1.12          PTT - ( 13 Oct 2022 02:33 )  PTT:62.0 sec  The current medications were reviewed   MEDICATIONS  (STANDING):  albumin human 25% IVPB 50 milliLiter(s) IV Intermittent once  atorvastatin 40 milliGRAM(s) Oral at bedtime  chlorhexidine 2% Cloths 1 Application(s) Topical <User Schedule>  clopidogrel Tablet 75 milliGRAM(s) Oral daily  DOPamine Infusion 1.5 MICROgram(s)/kG/Min (4.02 mL/Hr) IV Continuous <Continuous>  heparin  Infusion 850 Unit(s)/Hr (9.5 mL/Hr) IV Continuous <Continuous>  influenza  Vaccine (HIGH DOSE) 0.7 milliLiter(s) IntraMuscular once  magnesium sulfate  IVPB 2 Gram(s) IV Intermittent once  midodrine 10 milliGRAM(s) Oral every 8 hours  pantoprazole    Tablet 40 milliGRAM(s) Oral before breakfast  phenylephrine    Infusion 0.1 MICROgram(s)/kG/Min (2.68 mL/Hr) IV Continuous <Continuous>  senna 2 Tablet(s) Oral at bedtime  sodium chloride 0.9% lock flush 3 milliLiter(s) IV Push every 8 hours    MEDICATIONS  (PRN):  acetaminophen     Tablet .. 650 milliGRAM(s) Oral every 6 hours PRN Temp greater or equal to 38C (100.4F), Mild Pain (1 - 3)        72 years old  Male admitted with Coronary Artery Disease.  He a fall causing a laceration on the right side of the forehead with neurological changes.  CT Head: Proxima  left Middle Cerebral Artery thrombus.  S/P Aspiration thrombectomy.  Sinus bradycardia.  Hemodynamically stable.  Good oxygenation.  Fair urine out put.        My plan includes :  Keep higher MAP with phenylephrine.   Statin and Betablocker.  PO plavix.  PCI in 1-2 weeks.  Viability studies.   Close hemodynamic, ventilatory and drain monitoring and management  Monitor for arrhythmias and monitor parameters for organ perfusion  Monitor neurologic status  Monitor renal function.  Head of the bed should remain elevated to 45 deg .   Chest PT and IS will be encouraged  Monitor adequacy of oxygenation and ventilation and attempt to wean oxygen  Nutritional goals will be met using po eventually , ensure adequate caloric intake and monitor the same  Stress ulcer and VTE prophylaxis will be achieved    Glycemic control is satisfactory  Electrolytes have been repleted as necessary and wound care has been carried out. Pain control has been achieved.   Aggressive physical therapy and early mobility and ambulation goals will be met   The family was updated about the course and plan  CRITICAL CARE TIME SPENT in evaluation and management, reassessments, review and interpretation of labs and x-rays, ventilator and hemodynamic management, formulating a plan and coordinating care: ___30____ MIN.  Time does not include procedural time.  CTICU ATTENDING     					    Nik Shaw MD

## 2022-10-12 NOTE — CONSULT NOTE ADULT - SUBJECTIVE AND OBJECTIVE BOX
72M nicotine dependence no known PMH (doesn’t follow doctors) presented on 10/7 with intermittent CP x5d, found with NSTEMI 3V CAD, course c/b syncopal episode (found on floor with R forehead lac, aphasia, R weakness) while awaiting cabg, found w/ L M1 CVA on 10/9 s/p mech aspiration thrombectomy c/b R groin hematoma. Pt denies any SOB, orthopnea, PND prior to this admission. He has been a daily smoker (10 cigarettes/day x ~40 years). He does not regularly see a doctor.             HPI:  Patient is a 72-year-old male nicotine dependence who has not followed with physicians regularly presented to the emergency department with complaint of substernal chest pain radiating to bilateral  arms. Patient reports he had initial episode on 10/3 upon awakening. It lasted approximately 15 minutes patient took aspirin at home and resolved. Patient had another episode of chest pain   radiating to bilateral arms this morning lasting approximately 15 minutes again took a dose of aspirin. Reoccurred yesterday afternoon and presented to the emergency department for further   evaluation. In the ER patient was found to have inverted T waves in the lateral leads and markedly elevated troponin. Patient was chest pain-free upon arrival in the ER. Emergency department spoke   with interventional cardiology who recommend loading the patient with Plavix starting on heparin drip for planned cardiac catheterization. Patient denies any associated neck or jaw pain   with his chest pain symptoms. Patient denies any cardiac evaluation in the past. He denies any family history of heart disease. Cardiac cath showed (90% mLAD, 95% pLCX, 75% mRCA).  Denies GI bleeding, stroke, melena, varicosities.     (07 Oct 2022 18:34)    PAST MEDICAL & SURGICAL HISTORY:  H/O inguinal hernia repair    ALLERGIES/INTOLERANCES:  No Known Allergies    HOME MEDICATIONS:    INPATIENT MEDICATIONS:  DOPamine Infusion 1.5 MICROgram(s)/kG/Min (4.02 mL/Hr) IV Continuous <Continuous>  midodrine 10 milliGRAM(s) Oral every 8 hours  phenylephrine    Infusion 0.1 MICROgram(s)/kG/Min (2.68 mL/Hr) IV Continuous <Continuous>    clopidogrel Tablet 75 milliGRAM(s) Oral daily  heparin  Infusion 850 Unit(s)/Hr IV Continuous <Continuous>    acetaminophen     Tablet .. 650 milliGRAM(s) Oral every 6 hours PRN  atorvastatin 40 milliGRAM(s) Oral at bedtime  chlorhexidine 2% Cloths 1 Application(s) Topical <User Schedule>  influenza  Vaccine (HIGH DOSE) 0.7 milliLiter(s) IntraMuscular once  pantoprazole    Tablet 40 milliGRAM(s) Oral before breakfast  senna 2 Tablet(s) Oral at bedtime  sodium chloride 0.9% lock flush 3 milliLiter(s) IV Push every 8 hours      REVIEW OF SYSTEMS:    CONSTITUTIONAL: No weakness, F/C, wt loss/gain  EYES: No visual changes/disturbances  ENMT: No dry mouth, no vertigo  NECK: No pain or stiffness  RESPIRATORY: No cough, wheezing, hemoptysis; No shortness of breath  CARDIOVASCULAR: No chest pain, palpitations, lightheadedness/dizziness, LOC, or leg swelling  GASTROINTESTINAL: No abdominal or epigastric pain. No N/V/D/C. No melena, hematochezia, or hematemesis.  GENITOURINARY: No dysuria, increased frequency, hematuria, or incontinence  NEUROLOGICAL: No lightheadedness/dizziness, LOC, headaches, numbness or weakness  MUSCULOSKELETAL: No joint pain or swelling; No muscle, back, or extremity pain  SKIN: No itching, burning, rashes, or lesions   ENDOCRINE: No heat or cold intolerance; No hair loss  HEME/LYMPH: No easy bruising, or bleeding gums  PSYCHIATRIC: No depression, anxiety, mood swings, or difficulty sleeping    [ ] All other review of systems are negative unless indicated above.  [ ] Unable to obtain due to:    PHYSICAL EXAM:    T(C): 36.7 (10-12-22 @ 17:24), Max: 36.8 (10-12-22 @ 14:12)  HR: 45 (10-12-22 @ 17:00) (35 - 58)  BP: 95/52 (10-12-22 @ 17:00) (94/52 - 127/62)  RR: 16 (10-12-22 @ 17:00) (14 - 18)  SpO2: 96% (10-12-22 @ 17:00) (95% - 100%)  Wt(kg): --    I&O's Summary    11 Oct 2022 07:01  -  12 Oct 2022 07:00  --------------------------------------------------------  IN: 1323.2 mL / OUT: 1790 mL / NET: -466.8 mL    12 Oct 2022 07:01  -  12 Oct 2022 17:50  --------------------------------------------------------  IN: 227.2 mL / OUT: 700 mL / NET: -472.8 mL    NAD  No JVD  Regular, bradycardic, no m/r/g  CTAB  No LE edema    TELEMETRY: 	      ECG:  	  	  LABS:                        12.7   9.29  )-----------( 199      ( 12 Oct 2022 10:37 )             37.0     10-12    139  |  106  |  10  ----------------------------<  106<H>  4.2   |  22  |  0.87    Ca    9.3      12 Oct 2022 10:37  Phos  3.3     10-12  Mg     2.0     10-12    TPro  7.1  /  Alb  4.1  /  TBili  1.0  /  DBili  x   /  AST  20  /  ALT  15  /  AlkPhos  72  10-12      Lipid Profile:   HgA1c:   TSH:     CARDIAC MARKERS:          proBNP: Serum Pro-Brain Natriuretic Peptide: 1223 pg/mL (10-08 @ 05:30)      RADIOLOGY:      ASSESSMENT/PLAN: 	  72M nicotine dependence no known PMH (doesn’t follow doctors) presented on 10/7 with intermittent CP x5d, found with NSTEMI 3V CAD, course c/b syncopal episode (found on floor with R forehead lac, aphasia, R weakness) while awaiting cabg, found w/ L M1 CVA on 10/9 s/p mech aspiration thrombectomy c/b R groin hematoma. Briefly on aleena for BP support in ludwin-infarct period of CVA. Had been on dopamine for HR support (HR range 30s-50s), asymptomatic.    #HFrEF/ICM:  - GDMT: agree with weaning dopamine and aleena. Would wean midodrine if able to from neuro perspective. Anticipate starting MRA and SGLT2 in coming days.  - Volume: euvolemic, off diuretics  - Etiology: ischemic. Reasonable to pursue cMRI for viability to determine whether benefit of revascularization outweighs benefit (bleeding risk w/ heparin during LHC), and if so, will discuss timing with interventional cardiology.  - Device: not candidate at this point    d/w HF attending  Job Reynoso MD PGY6

## 2022-10-12 NOTE — PROGRESS NOTE ADULT - SUBJECTIVE AND OBJECTIVE BOX
CTICU  CRITICAL  CARE  attending     Hand off received 					   Pertinent clinical, laboratory, radiographic, hemodynamic, echocardiographic, respiratory data, microbiologic data and chart were reviewed and analyzed frequently throughout the course of the day and night  Patient seen and examined with CTS/ SH attending at bedside  Pt is a 72y , Male, HEALTH ISSUES - PROBLEM Dx:      , FAMILY HISTORY:  PAST MEDICAL & SURGICAL HISTORY:  H/O inguinal hernia repair        Patient is a 72y old  Male who presents with a chief complaint of Coronary Artery Disease (11 Oct 2022 23:15)      14 system review limited by mentation and multiorgan morbidity     Vital signs, hemodynamic and respiratory parameters were reviewed from the bedside nursing flowsheet.  ICU Vital Signs Last 24 Hrs  T(C): 36.4 (12 Oct 2022 05:01), Max: 36.7 (11 Oct 2022 17:06)  T(F): 97.5 (12 Oct 2022 05:01), Max: 98 (11 Oct 2022 17:06)  HR: 48 (12 Oct 2022 07:00) (37 - 67)  BP: 94/52 (12 Oct 2022 06:00) (94/52 - 128/60)  BP(mean): 70 (12 Oct 2022 06:00) (70 - 87)  ABP: 112/47 (12 Oct 2022 07:00) (94/64 - 134/59)  ABP(mean): 67 (12 Oct 2022 07:00) (64 - 90)  RR: 16 (12 Oct 2022 07:00) (14 - 18)  SpO2: 97% (12 Oct 2022 07:00) (95% - 100%)    O2 Parameters below as of 12 Oct 2022 08:00  Patient On (Oxygen Delivery Method): room air          Adult Advanced Hemodynamics Last 24 Hrs  CVP(mm Hg): --  CVP(cm H2O): --  CO: --  CI: --  PA: --  PA(mean): --  PCWP: --  SVR: --  SVRI: --  PVR: --  PVRI: --, ABG - ( 12 Oct 2022 03:10 )  pH, Arterial: 7.40  pH, Blood: x     /  pCO2: 30    /  pO2: 92    / HCO3: 19    / Base Excess: -5.0  /  SaO2: 98.7                Intake and output was reviewed and the fluid balance was calculated  Daily     Daily   I&O's Summary    11 Oct 2022 07:01  -  12 Oct 2022 07:00  --------------------------------------------------------  IN: 1323.2 mL / OUT: 1790 mL / NET: -466.8 mL    12 Oct 2022 07:01  -  12 Oct 2022 07:31  --------------------------------------------------------  IN: 28.1 mL / OUT: 0 mL / NET: 28.1 mL        All lines and drain sites were assessed  Glycemic trend was reviewedCAPILLARY BLOOD GLUCOSE        No acute change in focality  Auscultation of the chest reveals equal bs  Abdomen is soft  Extremities are warm and well perfused  Wounds appear clean and unremarkable  Antibiotics are periop    labs  CBC Full  -  ( 12 Oct 2022 03:10 )  WBC Count : 8.85 K/uL  RBC Count : 3.67 M/uL  Hemoglobin : 11.8 g/dL  Hematocrit : 34.4 %  Platelet Count - Automated : 201 K/uL  Mean Cell Volume : 93.7 fl  Mean Cell Hemoglobin : 32.2 pg  Mean Cell Hemoglobin Concentration : 34.3 gm/dL  Auto Neutrophil # : x  Auto Lymphocyte # : x  Auto Monocyte # : x  Auto Eosinophil # : x  Auto Basophil # : x  Auto Neutrophil % : x  Auto Lymphocyte % : x  Auto Monocyte % : x  Auto Eosinophil % : x  Auto Basophil % : x    10-12    139  |  109<H>  |  11  ----------------------------<  110<H>  4.2   |  22  |  0.91    Ca    8.6      12 Oct 2022 03:10  Phos  3.3     10-12  Mg     2.0     10-12    TPro  6.3  /  Alb  3.6  /  TBili  0.9  /  DBili  x   /  AST  17  /  ALT  15  /  AlkPhos  67  10-12    PT/INR - ( 12 Oct 2022 03:10 )   PT: 13.4 sec;   INR: 1.12          PTT - ( 12 Oct 2022 03:10 )  PTT:66.3 sec  The current medications were reviewed   MEDICATIONS  (STANDING):  atorvastatin 40 milliGRAM(s) Oral at bedtime  chlorhexidine 2% Cloths 1 Application(s) Topical <User Schedule>  clopidogrel Tablet 75 milliGRAM(s) Oral daily  DOPamine Infusion 1.5 MICROgram(s)/kG/Min (4.02 mL/Hr) IV Continuous <Continuous>  heparin  Infusion 850 Unit(s)/Hr (9.5 mL/Hr) IV Continuous <Continuous>  influenza  Vaccine (HIGH DOSE) 0.7 milliLiter(s) IntraMuscular once  pantoprazole    Tablet 40 milliGRAM(s) Oral before breakfast  phenylephrine    Infusion 0.1 MICROgram(s)/kG/Min (2.68 mL/Hr) IV Continuous <Continuous>  senna 2 Tablet(s) Oral at bedtime  sodium chloride 0.9% lock flush 3 milliLiter(s) IV Push every 8 hours    MEDICATIONS  (PRN):  acetaminophen     Tablet .. 650 milliGRAM(s) Oral every 6 hours PRN Temp greater or equal to 38C (100.4F), Mild Pain (1 - 3)       PROBLEM LIST/ ASSESSMENT:  HEALTH ISSUES - PROBLEM Dx:      ,   Patient is a 72y old  Male who presents with a chief complaint of Coronary Artery Disease (11 Oct 2022 23:15)                    My plan includes :  close hemodynamic, ventilatory and drain monitoring and management per post op routine    Monitor for arrhythmias and monitor parameters for organ perfusion  beta blockade not administered due to hemodynamic instability and bradycardia  monitor neurologic status  Head of the bed should remain elevated to 45 deg .   chest PT and IS will be encouraged  monitor adequacy of oxygenation and ventilation and attempt to wean oxygen  antibiotic regimen will be tailored to the clinical, laboratory and microbiologic data  Nutritional goals will be met using po eventually , ensure adequate caloric intake and montior the same  Stress ulcer and VTE prophylaxis will be achieved    Glycemic control is satisfactory  Electrolytes have been repleted as necessary and wound care has been carried out. Pain control has been achieved.   agressive physical therapy and early mobility and ambulation goals will be met   The family was updated about the course and plan  CRITICAL CARE TIME personally provided by me  in evaluation and management, reassessments, review and interpretation of labs and x-rays, ventilator and hemodynamic management, formulating a plan and coordinating care: ___90____ MIN.  Time does not include procedural time. Time spent was non routine post-operarive caRE and included multiple and repeated evaluations at the bedside  CTICU ATTENDING     					    Nehemiah Tejada MD

## 2022-10-12 NOTE — DIETITIAN INITIAL EVALUATION ADULT - PERTINENT LABORATORY DATA
10-12    139  |  106  |  10  ----------------------------<  106<H>  4.2   |  22  |  0.87    Ca    9.3      12 Oct 2022 10:37  Phos  3.3     10-12  Mg     2.0     10-12    TPro  7.1  /  Alb  4.1  /  TBili  1.0  /  DBili  x   /  AST  20  /  ALT  15  /  AlkPhos  72  10-12  A1C with Estimated Average Glucose Result: 5.4 % (10-08-22 @ 20:27)

## 2022-10-12 NOTE — DIETITIAN INITIAL EVALUATION ADULT - ADD RECOMMEND
1. Continue with current diet order (monitor need for ONS) 2. Encourage pt to meet nutritional needs as able 3. Encourage adherence to diet education (reinforce as able) 4. Pain and bowel regimen per team 5. Will assess/honor preferences as able 6. Monitor risk for malnutrition

## 2022-10-13 LAB
ALBUMIN SERPL ELPH-MCNC: 3.6 G/DL — SIGNIFICANT CHANGE UP (ref 3.3–5)
ALBUMIN SERPL ELPH-MCNC: 4.2 G/DL — SIGNIFICANT CHANGE UP (ref 3.3–5)
ALP SERPL-CCNC: 69 U/L — SIGNIFICANT CHANGE UP (ref 40–120)
ALP SERPL-CCNC: 73 U/L — SIGNIFICANT CHANGE UP (ref 40–120)
ALT FLD-CCNC: 16 U/L — SIGNIFICANT CHANGE UP (ref 10–45)
ALT FLD-CCNC: 20 U/L — SIGNIFICANT CHANGE UP (ref 10–45)
ANION GAP SERPL CALC-SCNC: 9 MMOL/L — SIGNIFICANT CHANGE UP (ref 5–17)
ANION GAP SERPL CALC-SCNC: 9 MMOL/L — SIGNIFICANT CHANGE UP (ref 5–17)
APTT BLD: 51.3 SEC — HIGH (ref 27.5–35.5)
APTT BLD: 62 SEC — HIGH (ref 27.5–35.5)
AST SERPL-CCNC: 18 U/L — SIGNIFICANT CHANGE UP (ref 10–40)
AST SERPL-CCNC: 23 U/L — SIGNIFICANT CHANGE UP (ref 10–40)
BILIRUB SERPL-MCNC: 1.1 MG/DL — SIGNIFICANT CHANGE UP (ref 0.2–1.2)
BILIRUB SERPL-MCNC: 1.2 MG/DL — SIGNIFICANT CHANGE UP (ref 0.2–1.2)
BUN SERPL-MCNC: 11 MG/DL — SIGNIFICANT CHANGE UP (ref 7–23)
BUN SERPL-MCNC: 12 MG/DL — SIGNIFICANT CHANGE UP (ref 7–23)
CALCIUM SERPL-MCNC: 9 MG/DL — SIGNIFICANT CHANGE UP (ref 8.4–10.5)
CALCIUM SERPL-MCNC: 9.4 MG/DL — SIGNIFICANT CHANGE UP (ref 8.4–10.5)
CHLORIDE SERPL-SCNC: 104 MMOL/L — SIGNIFICANT CHANGE UP (ref 96–108)
CHLORIDE SERPL-SCNC: 107 MMOL/L — SIGNIFICANT CHANGE UP (ref 96–108)
CO2 SERPL-SCNC: 23 MMOL/L — SIGNIFICANT CHANGE UP (ref 22–31)
CO2 SERPL-SCNC: 25 MMOL/L — SIGNIFICANT CHANGE UP (ref 22–31)
CREAT SERPL-MCNC: 1 MG/DL — SIGNIFICANT CHANGE UP (ref 0.5–1.3)
CREAT SERPL-MCNC: 1.01 MG/DL — SIGNIFICANT CHANGE UP (ref 0.5–1.3)
EGFR: 80 ML/MIN/1.73M2 — SIGNIFICANT CHANGE UP
EGFR: 80 ML/MIN/1.73M2 — SIGNIFICANT CHANGE UP
GLUCOSE SERPL-MCNC: 112 MG/DL — HIGH (ref 70–99)
GLUCOSE SERPL-MCNC: 99 MG/DL — SIGNIFICANT CHANGE UP (ref 70–99)
HCT VFR BLD CALC: 37.2 % — LOW (ref 39–50)
HCT VFR BLD CALC: 38.2 % — LOW (ref 39–50)
HGB BLD-MCNC: 12.7 G/DL — LOW (ref 13–17)
HGB BLD-MCNC: 12.8 G/DL — LOW (ref 13–17)
INR BLD: 1.08 — SIGNIFICANT CHANGE UP (ref 0.88–1.16)
INR BLD: 1.12 — SIGNIFICANT CHANGE UP (ref 0.88–1.16)
MAGNESIUM SERPL-MCNC: 1.9 MG/DL — SIGNIFICANT CHANGE UP (ref 1.6–2.6)
MAGNESIUM SERPL-MCNC: 2.7 MG/DL — HIGH (ref 1.6–2.6)
MCHC RBC-ENTMCNC: 31.9 PG — SIGNIFICANT CHANGE UP (ref 27–34)
MCHC RBC-ENTMCNC: 31.9 PG — SIGNIFICANT CHANGE UP (ref 27–34)
MCHC RBC-ENTMCNC: 33.5 GM/DL — SIGNIFICANT CHANGE UP (ref 32–36)
MCHC RBC-ENTMCNC: 34.1 GM/DL — SIGNIFICANT CHANGE UP (ref 32–36)
MCV RBC AUTO: 93.5 FL — SIGNIFICANT CHANGE UP (ref 80–100)
MCV RBC AUTO: 95.3 FL — SIGNIFICANT CHANGE UP (ref 80–100)
NRBC # BLD: 0 /100 WBCS — SIGNIFICANT CHANGE UP (ref 0–0)
NRBC # BLD: 0 /100 WBCS — SIGNIFICANT CHANGE UP (ref 0–0)
PHOSPHATE SERPL-MCNC: 3.7 MG/DL — SIGNIFICANT CHANGE UP (ref 2.5–4.5)
PHOSPHATE SERPL-MCNC: 3.7 MG/DL — SIGNIFICANT CHANGE UP (ref 2.5–4.5)
PLATELET # BLD AUTO: 198 K/UL — SIGNIFICANT CHANGE UP (ref 150–400)
PLATELET # BLD AUTO: 208 K/UL — SIGNIFICANT CHANGE UP (ref 150–400)
POTASSIUM SERPL-MCNC: 4.4 MMOL/L — SIGNIFICANT CHANGE UP (ref 3.5–5.3)
POTASSIUM SERPL-MCNC: 4.6 MMOL/L — SIGNIFICANT CHANGE UP (ref 3.5–5.3)
POTASSIUM SERPL-SCNC: 4.4 MMOL/L — SIGNIFICANT CHANGE UP (ref 3.5–5.3)
POTASSIUM SERPL-SCNC: 4.6 MMOL/L — SIGNIFICANT CHANGE UP (ref 3.5–5.3)
PROT SERPL-MCNC: 6.4 G/DL — SIGNIFICANT CHANGE UP (ref 6–8.3)
PROT SERPL-MCNC: 7.1 G/DL — SIGNIFICANT CHANGE UP (ref 6–8.3)
PROTHROM AB SERPL-ACNC: 12.9 SEC — SIGNIFICANT CHANGE UP (ref 10.5–13.4)
PROTHROM AB SERPL-ACNC: 13.3 SEC — SIGNIFICANT CHANGE UP (ref 10.5–13.4)
RBC # BLD: 3.98 M/UL — LOW (ref 4.2–5.8)
RBC # BLD: 4.01 M/UL — LOW (ref 4.2–5.8)
RBC # FLD: 13.2 % — SIGNIFICANT CHANGE UP (ref 10.3–14.5)
RBC # FLD: 13.2 % — SIGNIFICANT CHANGE UP (ref 10.3–14.5)
SODIUM SERPL-SCNC: 138 MMOL/L — SIGNIFICANT CHANGE UP (ref 135–145)
SODIUM SERPL-SCNC: 139 MMOL/L — SIGNIFICANT CHANGE UP (ref 135–145)
WBC # BLD: 7.26 K/UL — SIGNIFICANT CHANGE UP (ref 3.8–10.5)
WBC # BLD: 7.92 K/UL — SIGNIFICANT CHANGE UP (ref 3.8–10.5)
WBC # FLD AUTO: 7.26 K/UL — SIGNIFICANT CHANGE UP (ref 3.8–10.5)
WBC # FLD AUTO: 7.92 K/UL — SIGNIFICANT CHANGE UP (ref 3.8–10.5)

## 2022-10-13 PROCEDURE — 71045 X-RAY EXAM CHEST 1 VIEW: CPT | Mod: 26

## 2022-10-13 PROCEDURE — 99291 CRITICAL CARE FIRST HOUR: CPT

## 2022-10-13 PROCEDURE — 99292 CRITICAL CARE ADDL 30 MIN: CPT

## 2022-10-13 RX ORDER — MAGNESIUM SULFATE 500 MG/ML
2 VIAL (ML) INJECTION ONCE
Refills: 0 | Status: COMPLETED | OUTPATIENT
Start: 2022-10-13 | End: 2022-10-13

## 2022-10-13 RX ORDER — MIDODRINE HYDROCHLORIDE 2.5 MG/1
20 TABLET ORAL EVERY 8 HOURS
Refills: 0 | Status: DISCONTINUED | OUTPATIENT
Start: 2022-10-13 | End: 2022-10-15

## 2022-10-13 RX ADMIN — MIDODRINE HYDROCHLORIDE 10 MILLIGRAM(S): 2.5 TABLET ORAL at 02:57

## 2022-10-13 RX ADMIN — SODIUM CHLORIDE 3 MILLILITER(S): 9 INJECTION INTRAMUSCULAR; INTRAVENOUS; SUBCUTANEOUS at 21:57

## 2022-10-13 RX ADMIN — MIDODRINE HYDROCHLORIDE 20 MILLIGRAM(S): 2.5 TABLET ORAL at 10:48

## 2022-10-13 RX ADMIN — MIDODRINE HYDROCHLORIDE 20 MILLIGRAM(S): 2.5 TABLET ORAL at 17:56

## 2022-10-13 RX ADMIN — PANTOPRAZOLE SODIUM 40 MILLIGRAM(S): 20 TABLET, DELAYED RELEASE ORAL at 06:52

## 2022-10-13 RX ADMIN — SODIUM CHLORIDE 3 MILLILITER(S): 9 INJECTION INTRAMUSCULAR; INTRAVENOUS; SUBCUTANEOUS at 05:17

## 2022-10-13 RX ADMIN — CHLORHEXIDINE GLUCONATE 1 APPLICATION(S): 213 SOLUTION TOPICAL at 06:52

## 2022-10-13 RX ADMIN — SODIUM CHLORIDE 3 MILLILITER(S): 9 INJECTION INTRAMUSCULAR; INTRAVENOUS; SUBCUTANEOUS at 14:50

## 2022-10-13 RX ADMIN — CLOPIDOGREL BISULFATE 75 MILLIGRAM(S): 75 TABLET, FILM COATED ORAL at 17:06

## 2022-10-13 RX ADMIN — Medication 25 GRAM(S): at 05:14

## 2022-10-13 RX ADMIN — ATORVASTATIN CALCIUM 40 MILLIGRAM(S): 80 TABLET, FILM COATED ORAL at 21:25

## 2022-10-13 NOTE — CONSULT NOTE ADULT - SUBJECTIVE AND OBJECTIVE BOX
Electrophysiology Consult Note:     CHIEF COMPLAINT:  Patient is a 72y old  Male who presents with a chief complaint of Coronary Artery Disease (12 Oct 2022 22:22)        HISTORY OF PRESENT ILLNESS:   72-year-old male long-standing smoker who has not followed with physicians regularly, presented to the [facility] with complaint of CP last week and was ruled in NSTEMI.  Diagnostic cath 10/7 showed 90% mLAD, 95% pLCx and 75% mRCA.   EF initially low 15-20%.  Repeat Echo 10/10 here showed EF 35%.  He was transferred to [facility] for evaluation for CABG.  On 10/9, Stroke code was called on 10/9 after patient was found on the floor with a large right forehead laceration, aphasic, and with right sided weakness. CT head was negative for hemorrhage but demonstrated a hyperdensity within the proximal L MCA consistent with likely MCA thrombus.  He underwent LMCA mechanical aspiration thrombectomy on 10/9 evening. Neuro symptoms appear to resolve today; able to move all extremities and answer all questions.  He is awaiting for cardiac MRI for viability.  He is deemed high risk for CABG and now being evaluated for PCI instead.    EPS is called to evaluate for SB. Telemetry showing sinus bradycardia with HR mid 30 at rest. He walked this morning and HR was transiently in the 50s.  Not on AVN agents.   Pt feels fine currently. Told that he has always has slow HR in the past. No prior syncope. Currently w/o chest pain.           PAST MEDICAL & SURGICAL HISTORY:  H/O inguinal hernia repair    FAMILY HISTORY:  n/a    SOCIAL HISTORY:    Current smoker, 40 yrs 1/2ppd  No alcohol  No drugs      Allergies  No Known Allergies      MEDICATIONS  (STANDING):  albumin human 25% IVPB 50 milliLiter(s) IV Intermittent once  atorvastatin 40 milliGRAM(s) Oral at bedtime  chlorhexidine 2% Cloths 1 Application(s) Topical <User Schedule>  clopidogrel Tablet 75 milliGRAM(s) Oral daily  heparin  Infusion 850 Unit(s)/Hr (9.5 mL/Hr) IV Continuous <Continuous>  influenza  Vaccine (HIGH DOSE) 0.7 milliLiter(s) IntraMuscular once  midodrine 20 milliGRAM(s) Oral every 8 hours  pantoprazole    Tablet 40 milliGRAM(s) Oral before breakfast  phenylephrine    Infusion 0.1 MICROgram(s)/kG/Min (2.68 mL/Hr) IV Continuous <Continuous>  senna 2 Tablet(s) Oral at bedtime  sodium chloride 0.9% lock flush 3 milliLiter(s) IV Push every 8 hours    MEDICATIONS  (PRN):  acetaminophen     Tablet .. 650 milliGRAM(s) Oral every 6 hours PRN Temp greater or equal to 38C (100.4F), Mild Pain (1 - 3)      REVIEW OF SYSTEMS:  CONSTITUTIONAL: No fever, weight loss, or fatigue  EYES: No eye pain, visual disturbances, or discharge  ENMT:  No difficulty hearing, tinnitus, vertigo; No sinus or throat pain  NECK: No pain or stiffness  RESPIRATORY: No cough, wheezing, chills or hemoptysis; No Shortness of Breath  CARDIOVASCULAR: see HPI.    GASTROINTESTINAL: No abdominal or epigastric pain. No nausea, vomiting, or hematemesis; No diarrhea or constipation. No melena or hematochezia.  GENITOURINARY: No dysuria, frequency, hematuria, or incontinence  NEUROLOGICAL: No headaches, memory loss, loss of strength, numbness, or tremors  SKIN: No itching, burning, rashes, or lesions   LYMPH Nodes: No enlarged glands  ENDOCRINE: No heat or cold intolerance; No hair loss  MUSCULOSKELETAL: No joint pain or swelling; No muscle, back, or extremity pain  PSYCHIATRIC: No depression, anxiety, mood swings, or difficulty sleeping  HEME/LYMPH: No easy bruising, or bleeding gums  ALLERY AND IMMUNOLOGIC: No hives or eczema	      PHYSICAL EXAM:  Vital Signs Last 24 Hrs  T(C): 36.2 (13 Oct 2022 09:32), Max: 36.8 (12 Oct 2022 14:12)  T(F): 97.2 (13 Oct 2022 09:32), Max: 98.3 (12 Oct 2022 22:34)  HR: 37 (13 Oct 2022 10:00) (35 - 68)  BP: 100/51 (13 Oct 2022 10:00) (92/55 - 151/62)  BP(mean): 73 (13 Oct 2022 10:00) (71 - 89)  RR: 19 (13 Oct 2022 10:00) (14 - 19)  SpO2: 99% (13 Oct 2022 10:00) (96% - 100%)    Parameters below as of 13 Oct 2022 10:00  Patient On (Oxygen Delivery Method): room air    Constitutional: NAD	  HEENT:   Normal oral mucosa, PERRL, EOMI	  Neck: No JVD  CVS: Normal S1 / S2, bradycardia. No murmur   Pulm: CTA. No wheeze or rale  GI:  + BS, soft, NT / ND   Ext: No LE edema  Vascular: Peripheral pulses palpable 2+ bilaterally  Neurologic: A&O x 3  Psych: Pleasant  Skin: No rash or lesion       LABS:	                         12.8   7.26  )-----------( 208      ( 13 Oct 2022 09:45 )             38.2     10-13    138  |  104  |  x   ----------------------------<  x   4.6   |  x   |  x     Ca    9.0      13 Oct 2022 02:33  Phos  3.7     10-13  Mg     1.9     10-13    TPro  6.4  /  Alb  3.6  /  TBili  1.1  /  DBili  x   /  AST  18  /  ALT  16  /  AlkPhos  69  10-13    EKG: 10/9/22: Sinus flores 48 bpm.  ms.  ms. QTc 457 ms.  TWI in V3-V6. No q waves.   Telemetry: Sinus flores HR 30-40s.      TTE Echo w/ Contrast Follow Up Limited (10.10.22 @ 15:39) >   1. Left ventricular systolic function is moderately reduced with a   calculated ejection fraction of 35% with regional wall motion   abnormalities. No obvious LV thrombus seen.   2. Multiple segmental abnormalities exist. See findings.   3. Limited study obtained for evaluation of LV thrombus.   4. Compared to the previous TTE performed on 10/8/2022, there has been   interval improvement in LV systolic function.    TE Echo Complete w/ Contrast w/ Doppler (10.08.22 @ 08:07) >  -----  CONCLUSIONS:     1. Multiple segmental abnormalities exist. See findings.   2. Dilated left ventriclular size.   3. Severely reduced left ventricular systolic function.  EF 15-20%    4. Regional wall motion abnormalities consistent with ischemic heart   disease.   5. Grade I left ventricular diastolic dysfunction.   6. Normal right ventricular size and systolic function.   7. Dilated left atrium.   8. Mild-to-moderate mitral regurgitation.   9. Pulmonary artery systolic pressure is 27 mmHg.  10. No pericardial effusion.  11. No prior echo is available for comparison.    < end of copied text >   Electrophysiology Consult Note:     CHIEF COMPLAINT:  Patient is a 72y old  Male who presents with a chief complaint of Coronary Artery Disease (12 Oct 2022 22:22)        HISTORY OF PRESENT ILLNESS:   72-year-old male long-standing smoker who has not followed with physicians regularly, presented to the [facility] with complaint of CP last week and was ruled in NSTEMI.  Diagnostic cath 10/7 showed 90% mLAD, 95% pLCx and 75% mRCA.   EF initially low 15-20%.  Repeat Echo 10/10 here showed EF 35%.  He was transferred to [facility] for evaluation for CABG.  On 10/9, Stroke code was called on 10/9 after patient was found on the floor with a large right forehead laceration, aphasic, and with right sided weakness. CT head was negative for hemorrhage but demonstrated a hyperdensity within the proximal L MCA consistent with likely MCA thrombus.  He underwent LMCA mechanical aspiration thrombectomy on 10/9 evening. Neuro symptoms appear to resolve today; able to move all extremities and answer all questions.  He is awaiting for cardiac MRI for viability.  He is deemed high risk for CABG and now being evaluated for PCI instead.    EPS is called to evaluate for SB. Telemetry showing sinus bradycardia with HR mid 30 at rest. He walked this morning and HR was transiently in the 50s.  Not on AVN agents.  He is on phenylephrine gtt since 10/9.  On midodrine since 10/12.   Pt feels fine currently. Told that he has always has slow HR in the past. No prior syncope. Currently w/o chest pain.           PAST MEDICAL & SURGICAL HISTORY:  H/O inguinal hernia repair    FAMILY HISTORY:  n/a    SOCIAL HISTORY:    Current smoker, 40 yrs 1/2ppd  No alcohol  No drugs      Allergies  No Known Allergies      MEDICATIONS  (STANDING):  albumin human 25% IVPB 50 milliLiter(s) IV Intermittent once  atorvastatin 40 milliGRAM(s) Oral at bedtime  chlorhexidine 2% Cloths 1 Application(s) Topical <User Schedule>  clopidogrel Tablet 75 milliGRAM(s) Oral daily  heparin  Infusion 850 Unit(s)/Hr (9.5 mL/Hr) IV Continuous <Continuous>  influenza  Vaccine (HIGH DOSE) 0.7 milliLiter(s) IntraMuscular once  midodrine 20 milliGRAM(s) Oral every 8 hours  pantoprazole    Tablet 40 milliGRAM(s) Oral before breakfast  phenylephrine    Infusion 0.1 MICROgram(s)/kG/Min (2.68 mL/Hr) IV Continuous <Continuous>  senna 2 Tablet(s) Oral at bedtime  sodium chloride 0.9% lock flush 3 milliLiter(s) IV Push every 8 hours    MEDICATIONS  (PRN):  acetaminophen     Tablet .. 650 milliGRAM(s) Oral every 6 hours PRN Temp greater or equal to 38C (100.4F), Mild Pain (1 - 3)      REVIEW OF SYSTEMS:  CONSTITUTIONAL: No fever, weight loss, or fatigue  EYES: No eye pain, visual disturbances, or discharge  ENMT:  No difficulty hearing, tinnitus, vertigo; No sinus or throat pain  NECK: No pain or stiffness  RESPIRATORY: No cough, wheezing, chills or hemoptysis; No Shortness of Breath  CARDIOVASCULAR: see HPI.    GASTROINTESTINAL: No abdominal or epigastric pain. No nausea, vomiting, or hematemesis; No diarrhea or constipation. No melena or hematochezia.  GENITOURINARY: No dysuria, frequency, hematuria, or incontinence  NEUROLOGICAL: No headaches, memory loss, loss of strength, numbness, or tremors  SKIN: No itching, burning, rashes, or lesions   LYMPH Nodes: No enlarged glands  ENDOCRINE: No heat or cold intolerance; No hair loss  MUSCULOSKELETAL: No joint pain or swelling; No muscle, back, or extremity pain  PSYCHIATRIC: No depression, anxiety, mood swings, or difficulty sleeping  HEME/LYMPH: No easy bruising, or bleeding gums  ALLERY AND IMMUNOLOGIC: No hives or eczema	      PHYSICAL EXAM:  Vital Signs Last 24 Hrs  T(C): 36.2 (13 Oct 2022 09:32), Max: 36.8 (12 Oct 2022 14:12)  T(F): 97.2 (13 Oct 2022 09:32), Max: 98.3 (12 Oct 2022 22:34)  HR: 37 (13 Oct 2022 10:00) (35 - 68)  BP: 100/51 (13 Oct 2022 10:00) (92/55 - 151/62)  BP(mean): 73 (13 Oct 2022 10:00) (71 - 89)  RR: 19 (13 Oct 2022 10:00) (14 - 19)  SpO2: 99% (13 Oct 2022 10:00) (96% - 100%)    Parameters below as of 13 Oct 2022 10:00  Patient On (Oxygen Delivery Method): room air    Constitutional: NAD	  HEENT:   Normal oral mucosa, PERRL, EOMI	  Neck: No JVD  CVS: Normal S1 / S2, bradycardia. No murmur   Pulm: CTA. No wheeze or rale  GI:  + BS, soft, NT / ND   Ext: No LE edema  Vascular: Peripheral pulses palpable 2+ bilaterally  Neurologic: A&O x 3  Psych: Pleasant  Skin: No rash or lesion       LABS:	                         12.8   7.26  )-----------( 208      ( 13 Oct 2022 09:45 )             38.2     10-13    138  |  104  |  x   ----------------------------<  x   4.6   |  x   |  x     Ca    9.0      13 Oct 2022 02:33  Phos  3.7     10-13  Mg     1.9     10-13    TPro  6.4  /  Alb  3.6  /  TBili  1.1  /  DBili  x   /  AST  18  /  ALT  16  /  AlkPhos  69  10-13    EKG: 10/9/22: Sinus flores 48 bpm.  ms.  ms. QTc 457 ms.  TWI in V3-V6. No q waves.   Telemetry: Sinus flores HR 30-40s.        TTE Echo w/ Contrast Follow Up Limited (10.10.22 @ 15:39) >   1. Left ventricular systolic function is moderately reduced with a   calculated ejection fraction of 35% with regional wall motion abnormalities. No obvious LV thrombus seen.   2. Multiple segmental abnormalities exist. See findings.   3. Limited study obtained for evaluation of LV thrombus.   4. Compared to the previous TTE performed on 10/8/2022, there has been   interval improvement in LV systolic function.    TE Echo Complete w/ Contrast w/ Doppler (10.08.22 @ 08:07)    1. Multiple segmental abnormalities exist. See findings.   2. Dilated left ventriclular size.   3. Severely reduced left ventricular systolic function.  EF 15-20%    4. Regional wall motion abnormalities consistent with ischemic heart disease.   5. Grade I left ventricular diastolic dysfunction.   6. Normal right ventricular size and systolic function.   7. Dilated left atrium.   8. Mild-to-moderate mitral regurgitation.   9. Pulmonary artery systolic pressure is 27 mmHg.  10. No pericardial effusion.  11. No prior echo is available for comparison.     Electrophysiology Consult Note:     CHIEF COMPLAINT:  Patient is a 72y old  Male who presents with a chief complaint of Coronary Artery Disease (12 Oct 2022 22:22)        HISTORY OF PRESENT ILLNESS:   72-year-old male long-standing smoker who has not followed with physicians regularly, presented to the [facility] with complaint of CP last week and was ruled in NSTEMI.  Diagnostic cath 10/7 showed 90% mLAD, 95% pLCx and 75% mRCA.   EF initially low 15-20%.  Repeat Echo 10/10 here showed EF 35%.  He was transferred to [facility] for evaluation for CABG.  On 10/9, Stroke code was called on 10/9 after patient was found on the floor with a large right forehead laceration, aphasic, and with right sided weakness. CT head was negative for hemorrhage but demonstrated a hyperdensity within the proximal L MCA consistent with likely MCA thrombus.  He underwent LMCA mechanical aspiration thrombectomy on 10/9 evening. Neuro symptoms appear to resolve today; able to move all extremities and answer all questions.  He is awaiting for cardiac MRI for viability.  He is deemed high risk for CABG and now being evaluated for PCI instead.    EPS is called to evaluate for SB. Telemetry showing sinus bradycardia with HR mid 30 at rest. He walked this morning and HR was transiently in the 50s.  Not on AVN agents.  He is on phenylephrine gtt since 10/9.  On midodrine since 10/12.   Pt feels fine currently. Told that he has always has slow HR in the past. No prior syncope. Currently w/o chest pain.           PAST MEDICAL & SURGICAL HISTORY:  H/O inguinal hernia repair    FAMILY HISTORY:  n/a    SOCIAL HISTORY:    Current smoker, 40 yrs 1/2ppd  No alcohol  No drugs  Retired        Allergies  No Known Allergies      MEDICATIONS  (STANDING):  albumin human 25% IVPB 50 milliLiter(s) IV Intermittent once  atorvastatin 40 milliGRAM(s) Oral at bedtime  chlorhexidine 2% Cloths 1 Application(s) Topical <User Schedule>  clopidogrel Tablet 75 milliGRAM(s) Oral daily  heparin  Infusion 850 Unit(s)/Hr (9.5 mL/Hr) IV Continuous <Continuous>  influenza  Vaccine (HIGH DOSE) 0.7 milliLiter(s) IntraMuscular once  midodrine 20 milliGRAM(s) Oral every 8 hours  pantoprazole    Tablet 40 milliGRAM(s) Oral before breakfast  phenylephrine    Infusion 0.1 MICROgram(s)/kG/Min (2.68 mL/Hr) IV Continuous <Continuous>  senna 2 Tablet(s) Oral at bedtime  sodium chloride 0.9% lock flush 3 milliLiter(s) IV Push every 8 hours    MEDICATIONS  (PRN):  acetaminophen     Tablet .. 650 milliGRAM(s) Oral every 6 hours PRN Temp greater or equal to 38C (100.4F), Mild Pain (1 - 3)      REVIEW OF SYSTEMS:  CONSTITUTIONAL: No fever, weight loss, or fatigue  EYES: No eye pain, visual disturbances, or discharge  ENMT:  No difficulty hearing, tinnitus, vertigo; No sinus or throat pain  NECK: No pain or stiffness  RESPIRATORY: No cough, wheezing, chills or hemoptysis; No Shortness of Breath  CARDIOVASCULAR: see HPI.    GASTROINTESTINAL: No abdominal or epigastric pain. No nausea, vomiting, or hematemesis; No diarrhea or constipation. No melena or hematochezia.  GENITOURINARY: No dysuria, frequency, hematuria, or incontinence  NEUROLOGICAL: No headaches, memory loss, loss of strength, numbness, or tremors  SKIN: No itching, burning, rashes, or lesions   LYMPH Nodes: No enlarged glands  ENDOCRINE: No heat or cold intolerance; No hair loss  MUSCULOSKELETAL: No joint pain or swelling; No muscle, back, or extremity pain  PSYCHIATRIC: No depression, anxiety, mood swings, or difficulty sleeping  HEME/LYMPH: No easy bruising, or bleeding gums  ALLERY AND IMMUNOLOGIC: No hives or eczema	      PHYSICAL EXAM:  Vital Signs Last 24 Hrs  T(C): 36.2 (13 Oct 2022 09:32), Max: 36.8 (12 Oct 2022 14:12)  T(F): 97.2 (13 Oct 2022 09:32), Max: 98.3 (12 Oct 2022 22:34)  HR: 37 (13 Oct 2022 10:00) (35 - 68)  BP: 100/51 (13 Oct 2022 10:00) (92/55 - 151/62)  BP(mean): 73 (13 Oct 2022 10:00) (71 - 89)  RR: 19 (13 Oct 2022 10:00) (14 - 19)  SpO2: 99% (13 Oct 2022 10:00) (96% - 100%)    Parameters below as of 13 Oct 2022 10:00  Patient On (Oxygen Delivery Method): room air    Constitutional: NAD	  HEENT:   Normal oral mucosa, PERRL, EOMI	  Neck: No JVD  CVS: Normal S1 / S2, bradycardia. No murmur   Pulm: CTA. No wheeze or rale  GI:  + BS, soft, NT / ND   Ext: No LE edema  Vascular: Peripheral pulses palpable 2+ bilaterally  Neurologic: A&O x 3  Psych: Pleasant  Skin: No rash or lesion       LABS:	                         12.8   7.26  )-----------( 208      ( 13 Oct 2022 09:45 )             38.2     10-13    138  |  104  |  x   ----------------------------<  x   4.6   |  x   |  x     Ca    9.0      13 Oct 2022 02:33  Phos  3.7     10-13  Mg     1.9     10-13    TPro  6.4  /  Alb  3.6  /  TBili  1.1  /  DBili  x   /  AST  18  /  ALT  16  /  AlkPhos  69  10-13    EKG: 10/9/22: Sinus flores 48 bpm.  ms.  ms. QTc 457 ms.  TWI in V3-V6. No q waves.   Telemetry: Sinus flores HR 30-40s.        TTE Echo w/ Contrast Follow Up Limited (10.10.22 @ 15:39) >   1. Left ventricular systolic function is moderately reduced with a   calculated ejection fraction of 35% with regional wall motion abnormalities. No obvious LV thrombus seen.   2. Multiple segmental abnormalities exist. See findings.   3. Limited study obtained for evaluation of LV thrombus.   4. Compared to the previous TTE performed on 10/8/2022, there has been   interval improvement in LV systolic function.    TE Echo Complete w/ Contrast w/ Doppler (10.08.22 @ 08:07)    1. Multiple segmental abnormalities exist. See findings.   2. Dilated left ventriclular size.   3. Severely reduced left ventricular systolic function.  EF 15-20%    4. Regional wall motion abnormalities consistent with ischemic heart disease.   5. Grade I left ventricular diastolic dysfunction.   6. Normal right ventricular size and systolic function.   7. Dilated left atrium.   8. Mild-to-moderate mitral regurgitation.   9. Pulmonary artery systolic pressure is 27 mmHg.  10. No pericardial effusion.  11. No prior echo is available for comparison.     Electrophysiology Consult Note:     CHIEF COMPLAINT:  Patient is a 72y old  Male who presents with a chief complaint of Coronary Artery Disease (12 Oct 2022 22:22)        HISTORY OF PRESENT ILLNESS:   72-year-old male long-standing smoker who has not followed with physicians regularly, presented to the [facility] with complaint of CP last week and was ruled in NSTEMI.  Diagnostic cath 10/7 showed 90% mLAD, 95% pLCx and 75% mRCA.   EF initially low 15-20%.  Repeat Echo 10/10 here showed EF 35%.  He was transferred to [facility] for evaluation for CABG.  On 10/9, Stroke code was called on 10/9 after patient was found on the floor with a large right forehead laceration, aphasic, and with right sided weakness. CT head was negative for hemorrhage but demonstrated a hyperdensity within the proximal L MCA consistent with likely MCA thrombus.  He underwent LMCA mechanical aspiration thrombectomy on 10/9 evening. Neuro symptoms appear to resolve today; able to move all extremities and answer all questions.  He is awaiting for cardiac MRI for viability.  He is deemed high risk for CABG and now being evaluated for PCI instead.    EPS is called to evaluate for SB. Telemetry showing sinus bradycardia with HR mid 30 at rest. He walked this morning and HR was transiently in the 50s.  Not on AVN agents.  He is on phenylephrine gtt since 10/9.  On midodrine since 10/12.   Pt feels fine currently. Told that he has always has slow HR in the past. No prior syncope. Currently w/o chest pain.           PAST MEDICAL & SURGICAL HISTORY:  H/O inguinal hernia repair    FAMILY HISTORY:  n/a    SOCIAL HISTORY:    Current smoker, 40 yrs 1/2ppd  No alcohol  No drugs  Retired        Allergies  No Known Allergies      MEDICATIONS  (STANDING):  albumin human 25% IVPB 50 milliLiter(s) IV Intermittent once  atorvastatin 40 milliGRAM(s) Oral at bedtime  chlorhexidine 2% Cloths 1 Application(s) Topical <User Schedule>  clopidogrel Tablet 75 milliGRAM(s) Oral daily  heparin  Infusion 850 Unit(s)/Hr (9.5 mL/Hr) IV Continuous <Continuous>  influenza  Vaccine (HIGH DOSE) 0.7 milliLiter(s) IntraMuscular once  midodrine 20 milliGRAM(s) Oral every 8 hours  pantoprazole    Tablet 40 milliGRAM(s) Oral before breakfast  phenylephrine    Infusion 0.1 MICROgram(s)/kG/Min (2.68 mL/Hr) IV Continuous <Continuous>  senna 2 Tablet(s) Oral at bedtime  sodium chloride 0.9% lock flush 3 milliLiter(s) IV Push every 8 hours    MEDICATIONS  (PRN):  acetaminophen     Tablet .. 650 milliGRAM(s) Oral every 6 hours PRN Temp greater or equal to 38C (100.4F), Mild Pain (1 - 3)      REVIEW OF SYSTEMS:  CONSTITUTIONAL: No fever, weight loss, or fatigue  EYES: No eye pain, visual disturbances, or discharge  ENMT:  No difficulty hearing, tinnitus, vertigo; No sinus or throat pain  NECK: No pain or stiffness  RESPIRATORY: No cough, wheezing, chills or hemoptysis; No Shortness of Breath  CARDIOVASCULAR: see HPI.    GASTROINTESTINAL: No abdominal or epigastric pain. No nausea, vomiting, or hematemesis; No diarrhea or constipation. No melena or hematochezia.  GENITOURINARY: No dysuria, frequency, hematuria, or incontinence  NEUROLOGICAL: No headaches, memory loss, loss of strength, numbness, or tremors  SKIN: No itching, burning, rashes, or lesions   LYMPH Nodes: No enlarged glands  ENDOCRINE: No heat or cold intolerance; No hair loss  MUSCULOSKELETAL: No joint pain or swelling; No muscle, back, or extremity pain  PSYCHIATRIC: No depression, anxiety, mood swings, or difficulty sleeping  HEME/LYMPH: No easy bruising, or bleeding gums  ALLERY AND IMMUNOLOGIC: No hives or eczema	      PHYSICAL EXAM:  Vital Signs Last 24 Hrs  T(C): 36.2 (13 Oct 2022 09:32), Max: 36.8 (12 Oct 2022 14:12)  T(F): 97.2 (13 Oct 2022 09:32), Max: 98.3 (12 Oct 2022 22:34)  HR: 37 (13 Oct 2022 10:00) (35 - 68)  BP: 100/51 (13 Oct 2022 10:00) (92/55 - 151/62)  BP(mean): 73 (13 Oct 2022 10:00) (71 - 89)  RR: 19 (13 Oct 2022 10:00) (14 - 19)  SpO2: 99% (13 Oct 2022 10:00) (96% - 100%)    Parameters below as of 13 Oct 2022 10:00  Patient On (Oxygen Delivery Method): room air    Constitutional: NAD	  HEENT:   Normal oral mucosa, PERRL, EOMI	  Neck: No JVD  CVS: Normal S1 / S2, bradycardia. No murmur   Pulm: CTA. No wheeze or rale  GI:  + BS, soft, NT / ND   Ext: No LE edema  Vascular: Peripheral pulses palpable 2+ bilaterally  Neurologic: A&O x 3  Psych: Pleasant  Skin: No rash or lesion       LABS:	                         12.8   7.26  )-----------( 208      ( 13 Oct 2022 09:45 )             38.2     10-13    138  |  104  |  x   ----------------------------<  x   4.6   |  x   |  x     Ca    9.0      13 Oct 2022 02:33  Phos  3.7     10-13  Mg     1.9     10-13    TPro  6.4  /  Alb  3.6  /  TBili  1.1  /  DBili  x   /  AST  18  /  ALT  16  /  AlkPhos  69  10-13    EKG: 10/9/22: Sinus flores 48 bpm.  ms.  ms. QTc 457 ms.  TWI in V3-V6. No q waves.   Telemetry: Sinus flores HR 30-40s.  No evidence of atrial arrhythmia       TTE Echo w/ Contrast Follow Up Limited (10.10.22 @ 15:39) >   1. Left ventricular systolic function is moderately reduced with a   calculated ejection fraction of 35% with regional wall motion abnormalities. No obvious LV thrombus seen.   2. Multiple segmental abnormalities exist. See findings.   3. Limited study obtained for evaluation of LV thrombus.   4. Compared to the previous TTE performed on 10/8/2022, there has been   interval improvement in LV systolic function.    TE Echo Complete w/ Contrast w/ Doppler (10.08.22 @ 08:07)    1. Multiple segmental abnormalities exist. See findings.   2. Dilated left ventriclular size.   3. Severely reduced left ventricular systolic function.  EF 15-20%    4. Regional wall motion abnormalities consistent with ischemic heart disease.   5. Grade I left ventricular diastolic dysfunction.   6. Normal right ventricular size and systolic function.   7. Dilated left atrium.   8. Mild-to-moderate mitral regurgitation.   9. Pulmonary artery systolic pressure is 27 mmHg.  10. No pericardial effusion.  11. No prior echo is available for comparison.

## 2022-10-13 NOTE — CONSULT NOTE ADULT - REASON FOR ADMISSION
Coronary Artery Disease

## 2022-10-13 NOTE — CONSULT NOTE ADULT - CONSULT REQUESTED DATE/TIME
13-Oct-2022 11:15
09-Oct-2022 22:15
10-Oct-2022 09:17
12-Oct-2022
12-Oct-2022 08:23
09-Oct-2022 19:15

## 2022-10-13 NOTE — CONSULT NOTE ADULT - ASSESSMENT
71 y/o M long-standing smoker w/o regular MD followup, p/w NSTEMI with 3v CAD.  Has LVEF 35%. Hospital course complicated by LMCA stroke, s/p mechanical aspiration thrombectomy on 10/9.  Awaiting for cMRI for viability and assessment for PCI.  Currently on phenylephrine and midodrine.  EPS is evaluating for asymptomatic sinus bradycardia.    - Bradycardia can be secondary to baroreceptor reflex from phenylephrine and midodrine use.  It can also be secondary to post stroke.  Nothing to do for now.  Will reassess HR once he is off phenylephrine and midodrine.  If he is still bradycardic after PCI, will likely need PPM before discharge home.

## 2022-10-13 NOTE — PROGRESS NOTE ADULT - SUBJECTIVE AND OBJECTIVE BOX
CTICU  CRITICAL  CARE  attending     Hand off received 					   Pertinent clinical, laboratory, radiographic, hemodynamic, echocardiographic, respiratory data, microbiologic data and chart were reviewed and analyzed frequently throughout the course of the day  Patient seen and examined with CTS/ SH attending at bedside  Pt is a 72 yr old male presented to ER for CP, found to have NSTEMI and tx to [facility] 10/7. Started on heparin gtt with cardiac cath showing 3VD. CB L MCA stroke on 10/9 sp thrombectomy. Planned for PCI. On phenylephrine gtt.       FAMILY HISTORY:  PAST MEDICAL & SURGICAL HISTORY:  H/O inguinal hernia repair        Patient is a 72y old  Male who presents with a chief complaint of Coronary Artery Disease.      14 system review was unremarkable    Vital signs, hemodynamic and respiratory parameters were reviewed from the bedside nursing flowsheet.  ICU Vital Signs Last 24 Hrs  T(C): 36.2 (13 Oct 2022 09:32), Max: 36.8 (12 Oct 2022 14:12)  T(F): 97.2 (13 Oct 2022 09:32), Max: 98.3 (12 Oct 2022 22:34)  HR: 46 (13 Oct 2022 11:00) (35 - 68)  BP: 109/53 (13 Oct 2022 11:00) (92/55 - 151/62)  BP(mean): 76 (13 Oct 2022 11:00) (71 - 89)  ABP: 86/51 (12 Oct 2022 17:00) (81/64 - 109/51)  ABP(mean): 66 (12 Oct 2022 17:00) (66 - 76)  RR: 18 (13 Oct 2022 11:00) (14 - 19)  SpO2: 94% (13 Oct 2022 11:00) (94% - 100%)    O2 Parameters below as of 13 Oct 2022 11:00  Patient On (Oxygen Delivery Method): room air          Adult Advanced Hemodynamics Last 24 Hrs  CVP(mm Hg): --  CVP(cm H2O): --  CO: --  CI: --  PA: --  PA(mean): --  PCWP: --  SVR: --  SVRI: --  PVR: --  PVRI: --, ABG - ( 12 Oct 2022 03:10 )  pH, Arterial: 7.40  pH, Blood: x     /  pCO2: 30    /  pO2: 92    / HCO3: 19    / Base Excess: -5.0  /  SaO2: 98.7                Intake and output was reviewed and the fluid balance was calculated  Daily     Daily   I&O's Summary    12 Oct 2022 07:01  -  13 Oct 2022 07:00  --------------------------------------------------------  IN: 690.4 mL / OUT: 2000 mL / NET: -1309.6 mL    13 Oct 2022 07:01  -  13 Oct 2022 12:32  --------------------------------------------------------  IN: 17.5 mL / OUT: 0 mL / NET: 17.5 mL        All lines and drain sites were assessed  Glycemic trend was reviewed        Neuro: alert,   HEENT: mmm  Heart: s1 s2  Lungs: clear bl  Abdomen: soft nt nd  Extremities: 2+dp      labs  CBC Full  -  ( 13 Oct 2022 09:45 )  WBC Count : 7.26 K/uL  RBC Count : 4.01 M/uL  Hemoglobin : 12.8 g/dL  Hematocrit : 38.2 %  Platelet Count - Automated : 208 K/uL  Mean Cell Volume : 95.3 fl  Mean Cell Hemoglobin : 31.9 pg  Mean Cell Hemoglobin Concentration : 33.5 gm/dL  Auto Neutrophil # : x  Auto Lymphocyte # : x  Auto Monocyte # : x  Auto Eosinophil # : x  Auto Basophil # : x  Auto Neutrophil % : x  Auto Lymphocyte % : x  Auto Monocyte % : x  Auto Eosinophil % : x  Auto Basophil % : x    10-13    138  |  104  |  11  ----------------------------<  112<H>  4.6   |  25  |  1.00    Ca    9.4      13 Oct 2022 09:45  Phos  3.7     10-13  Mg     2.7     10-13    TPro  7.1  /  Alb  4.2  /  TBili  1.2  /  DBili  x   /  AST  23  /  ALT  20  /  AlkPhos  73  10-13    PT/INR - ( 13 Oct 2022 09:45 )   PT: 12.9 sec;   INR: 1.08          PTT - ( 13 Oct 2022 09:45 )  PTT:51.3 sec  The current medications were reviewed   MEDICATIONS  (STANDING):  albumin human 25% IVPB 50 milliLiter(s) IV Intermittent once  atorvastatin 40 milliGRAM(s) Oral at bedtime  chlorhexidine 2% Cloths 1 Application(s) Topical <User Schedule>  clopidogrel Tablet 75 milliGRAM(s) Oral daily  heparin  Infusion 850 Unit(s)/Hr (9.5 mL/Hr) IV Continuous <Continuous>  influenza  Vaccine (HIGH DOSE) 0.7 milliLiter(s) IntraMuscular once  midodrine 20 milliGRAM(s) Oral every 8 hours  pantoprazole    Tablet 40 milliGRAM(s) Oral before breakfast  phenylephrine    Infusion 0.1 MICROgram(s)/kG/Min (2.68 mL/Hr) IV Continuous <Continuous>  senna 2 Tablet(s) Oral at bedtime  sodium chloride 0.9% lock flush 3 milliLiter(s) IV Push every 8 hours    MEDICATIONS  (PRN):  acetaminophen     Tablet .. 650 milliGRAM(s) Oral every 6 hours PRN Temp greater or equal to 38C (100.4F), Mild Pain (1 - 3)      Assessment/Plan:  72 yr old male presented to ER for CP, found to have NSTEMI and tx to [facility] 10/7. Started on heparin gtt with cardiac cath showing 3VD. CB L MCA stroke on 10/9 sp thrombectomy. Planned for PCI. On phenylephrine gtt.     CAD  L MCA stroke sp thrombectomy  Continue heparin  Preop planning  Increase midodrine  Plavix  Diet as tolerated  Replete lytes prn  GI/DVT PPX  Bowel Regimen  Pain control  OOB with PT  Close hemodynamic, ventilatory and drain monitoring and management per post op routine  Monitor for arrhythmias and monitor parameters for organ perfusion  Monitor neurologic status  Head of the bed should remain elevated to 45 deg   Chest PT and IS will be encouraged  Monitor adequacy of oxygenation and ventilation and attempt to wean oxygen  Antibiotic regimen will be tailored to the clinical, laboratory and microbiologic data  Nutritional goals will be met using po eventually, ensure adequate caloric intake and monitor the same  Stress ulcer and VTE prophylaxis will be achieved    Glycemic control is satisfactory  Electrolytes have been repleted as necessary and wound care has been carried out   Pain control has been achieved.   Aggressive physical therapy and early mobility and ambulation goals will be met   The family was updated about the course and plan.    CRITICAL CARE TIME personally provided by me  in evaluation and management, reassessments, review and interpretation of labs and x-rays, ventilator and hemodynamic management, formulating a plan and coordinating care: ___90____ MIN.  Time does not include procedural time.    CTICU ATTENDING     					  Napoleon Gould MD

## 2022-10-13 NOTE — CONSULT NOTE ADULT - CONSULT REASON
CAD/CVA
sinus bradycardia
Hematoma w decreased pulses
stroke code
HF
right facial, aphasia, right sided weakness

## 2022-10-14 LAB
ALBUMIN SERPL ELPH-MCNC: 3.6 G/DL — SIGNIFICANT CHANGE UP (ref 3.3–5)
ALP SERPL-CCNC: 78 U/L — SIGNIFICANT CHANGE UP (ref 40–120)
ALT FLD-CCNC: 18 U/L — SIGNIFICANT CHANGE UP (ref 10–45)
ANION GAP SERPL CALC-SCNC: 7 MMOL/L — SIGNIFICANT CHANGE UP (ref 5–17)
APTT BLD: 50.9 SEC — HIGH (ref 27.5–35.5)
AST SERPL-CCNC: 21 U/L — SIGNIFICANT CHANGE UP (ref 10–40)
BILIRUB SERPL-MCNC: 0.6 MG/DL — SIGNIFICANT CHANGE UP (ref 0.2–1.2)
BUN SERPL-MCNC: 12 MG/DL — SIGNIFICANT CHANGE UP (ref 7–23)
CALCIUM SERPL-MCNC: 8.8 MG/DL — SIGNIFICANT CHANGE UP (ref 8.4–10.5)
CHLORIDE SERPL-SCNC: 106 MMOL/L — SIGNIFICANT CHANGE UP (ref 96–108)
CO2 SERPL-SCNC: 27 MMOL/L — SIGNIFICANT CHANGE UP (ref 22–31)
CREAT SERPL-MCNC: 1.12 MG/DL — SIGNIFICANT CHANGE UP (ref 0.5–1.3)
EGFR: 70 ML/MIN/1.73M2 — SIGNIFICANT CHANGE UP
GLUCOSE SERPL-MCNC: 111 MG/DL — HIGH (ref 70–99)
HCT VFR BLD CALC: 35.4 % — LOW (ref 39–50)
HGB BLD-MCNC: 11.6 G/DL — LOW (ref 13–17)
INR BLD: 1.07 — SIGNIFICANT CHANGE UP (ref 0.88–1.16)
MAGNESIUM SERPL-MCNC: 2 MG/DL — SIGNIFICANT CHANGE UP (ref 1.6–2.6)
MCHC RBC-ENTMCNC: 31.6 PG — SIGNIFICANT CHANGE UP (ref 27–34)
MCHC RBC-ENTMCNC: 32.8 GM/DL — SIGNIFICANT CHANGE UP (ref 32–36)
MCV RBC AUTO: 96.5 FL — SIGNIFICANT CHANGE UP (ref 80–100)
NRBC # BLD: 0 /100 WBCS — SIGNIFICANT CHANGE UP (ref 0–0)
PHOSPHATE SERPL-MCNC: 3.9 MG/DL — SIGNIFICANT CHANGE UP (ref 2.5–4.5)
PLATELET # BLD AUTO: 211 K/UL — SIGNIFICANT CHANGE UP (ref 150–400)
POTASSIUM SERPL-MCNC: 4.3 MMOL/L — SIGNIFICANT CHANGE UP (ref 3.5–5.3)
POTASSIUM SERPL-SCNC: 4.3 MMOL/L — SIGNIFICANT CHANGE UP (ref 3.5–5.3)
PROT SERPL-MCNC: 6.3 G/DL — SIGNIFICANT CHANGE UP (ref 6–8.3)
PROTHROM AB SERPL-ACNC: 12.7 SEC — SIGNIFICANT CHANGE UP (ref 10.5–13.4)
RBC # BLD: 3.67 M/UL — LOW (ref 4.2–5.8)
RBC # FLD: 13.2 % — SIGNIFICANT CHANGE UP (ref 10.3–14.5)
SODIUM SERPL-SCNC: 140 MMOL/L — SIGNIFICANT CHANGE UP (ref 135–145)
WBC # BLD: 7.2 K/UL — SIGNIFICANT CHANGE UP (ref 3.8–10.5)
WBC # FLD AUTO: 7.2 K/UL — SIGNIFICANT CHANGE UP (ref 3.8–10.5)

## 2022-10-14 PROCEDURE — 99292 CRITICAL CARE ADDL 30 MIN: CPT

## 2022-10-14 PROCEDURE — 75561 CARDIAC MRI FOR MORPH W/DYE: CPT | Mod: 26,MH

## 2022-10-14 PROCEDURE — 99233 SBSQ HOSP IP/OBS HIGH 50: CPT

## 2022-10-14 PROCEDURE — 71045 X-RAY EXAM CHEST 1 VIEW: CPT | Mod: 26

## 2022-10-14 PROCEDURE — 99291 CRITICAL CARE FIRST HOUR: CPT

## 2022-10-14 PROCEDURE — 99232 SBSQ HOSP IP/OBS MODERATE 35: CPT

## 2022-10-14 RX ORDER — ALBUMIN HUMAN 25 %
500 VIAL (ML) INTRAVENOUS ONCE
Refills: 0 | Status: COMPLETED | OUTPATIENT
Start: 2022-10-14 | End: 2022-10-14

## 2022-10-14 RX ORDER — SODIUM CHLORIDE 9 MG/ML
500 INJECTION, SOLUTION INTRAVENOUS ONCE
Refills: 0 | Status: COMPLETED | OUTPATIENT
Start: 2022-10-14 | End: 2022-10-14

## 2022-10-14 RX ADMIN — SODIUM CHLORIDE 3 MILLILITER(S): 9 INJECTION INTRAMUSCULAR; INTRAVENOUS; SUBCUTANEOUS at 12:00

## 2022-10-14 RX ADMIN — CHLORHEXIDINE GLUCONATE 1 APPLICATION(S): 213 SOLUTION TOPICAL at 06:35

## 2022-10-14 RX ADMIN — SENNA PLUS 2 TABLET(S): 8.6 TABLET ORAL at 23:43

## 2022-10-14 RX ADMIN — MIDODRINE HYDROCHLORIDE 20 MILLIGRAM(S): 2.5 TABLET ORAL at 18:14

## 2022-10-14 RX ADMIN — PANTOPRAZOLE SODIUM 40 MILLIGRAM(S): 20 TABLET, DELAYED RELEASE ORAL at 06:35

## 2022-10-14 RX ADMIN — MIDODRINE HYDROCHLORIDE 20 MILLIGRAM(S): 2.5 TABLET ORAL at 11:26

## 2022-10-14 RX ADMIN — Medication 250 MILLILITER(S): at 03:15

## 2022-10-14 RX ADMIN — MIDODRINE HYDROCHLORIDE 20 MILLIGRAM(S): 2.5 TABLET ORAL at 02:38

## 2022-10-14 RX ADMIN — SODIUM CHLORIDE 3 MILLILITER(S): 9 INJECTION INTRAMUSCULAR; INTRAVENOUS; SUBCUTANEOUS at 07:04

## 2022-10-14 RX ADMIN — SODIUM CHLORIDE 166.67 MILLILITER(S): 9 INJECTION, SOLUTION INTRAVENOUS at 10:15

## 2022-10-14 RX ADMIN — ATORVASTATIN CALCIUM 40 MILLIGRAM(S): 80 TABLET, FILM COATED ORAL at 23:44

## 2022-10-14 RX ADMIN — CLOPIDOGREL BISULFATE 75 MILLIGRAM(S): 75 TABLET, FILM COATED ORAL at 11:26

## 2022-10-14 NOTE — PROGRESS NOTE ADULT - ASSESSMENT
72 M long-standing smoker w/o regular MD followup, p/w NSTEMI with 3v CAD.  Has LVEF 35%. Hospital course complicated by LMCA stroke, s/p mechanical aspiration thrombectomy on 10/9. Not a candidate for open heart, Awaiting cMRI for viability and assessment for PCI.    ASSESSMENT/PLAN:  Neuro exam improved/ Non focal with improving aphasia/ ambulating without issues  HDS though bradycardic/ asymptomatic  Evaluated by EP/ no plan for PPM as of yet, bradycardia attributed to midodrine/Henry if persists after revascularization EP will consider PPM-- EP will follow   Given improving Neuro exam and Day 5-6 after stroke would consider to wean off Henry for MAP goal > 70 mmhg and Monitor neuro exam closely/ Midodrine dose titrated to 20 q 8  On heparin gtt post thrombectomy/ PTT at goal   Plavix and Statin for CAD   HFrEF ~ 35% compensated from HF stand point/ appears euvolemic autodiuresing well will monitor for now   Good kidney fx/ Lytes in good range/ prn diuretics to maintain neutral FB  H&H and PLT-- Normal   Evaluated by S&S post CVA/ presenting with mild-moderate oral phase deficits-- Cont soft and bite sized solids/ thin liquid   DASH diet/ Glycemic control satisfactory   Ppx: full AC/PPI/HOB 45  OOB and ambulating as tolerated   Awaiting w/u for PCI next week     ATTENDING: I have personally and independently provided 30 Min of critical care services. This excludes time spent for procedures or teaching. 72 M long-standing smoker w/o regular MD followup, p/w NSTEMI with 3v CAD.  Has LVEF 35%. Hospital course complicated by LMCA stroke, s/p mechanical aspiration thrombectomy on 10/9 complicated by groin hematoma.  Not a candidate for open heart, Awaiting cMRI for viability and assessment for PCI.    ASSESSMENT/PLAN:  Neuro exam improved/ Non focal with improving aphasia/ ambulating without issues  HDS though bradycardic/ asymptomatic  Evaluated by EP/ no plan for PPM as of yet, bradycardia attributed to midodrine/Henry if persists after revascularization EP will consider PPM-- EP will follow   Given improving Neuro exam and Day 5-6 after stroke would consider to wean off Henry for MAP goal > 70 mmhg and Monitor neuro exam closely/ Midodrine dose titrated to 20 q 8  On heparin gtt post thrombectomy/ PTT at goal   Groin hematoma and swelling unchanged  Plavix and Statin for CAD   HFrEF ~ 35% compensated from HF stand point/ appears euvolemic autodiuresing well will monitor for now   Good kidney fx/ Lytes in good range/ prn diuretics to maintain neutral FB  H&H and PLT-- Normal   Evaluated by S&S post CVA/ presenting with mild-moderate oral phase deficits-- Cont soft and bite sized solids/ thin liquid   DASH diet/ Glycemic control satisfactory   Ppx: full AC/PPI/HOB 45  OOB and ambulating as tolerated   Awaiting w/u for PCI next week     ATTENDING: I have personally and independently provided 30 Min of critical care services. This excludes time spent for procedures or teaching.

## 2022-10-14 NOTE — PROGRESS NOTE ADULT - ASSESSMENT
71 y/o M long-standing smoker p/w NSTEMI with 3v CAD.  Has LVEF 35%. Hospital course complicated by LMCA stroke, s/p mechanical aspiration thrombectomy on 10/9. Neuro status back to baseline. Awaiting for cMRI for viability and assessment for PCI next week.  Still on midodrine.    - Continue to assess HR when he is able to be off midodrine and post PCI.  HR improves slightly with being off  phenylephrine.

## 2022-10-14 NOTE — PROGRESS NOTE ADULT - SUBJECTIVE AND OBJECTIVE BOX
CTICU  CRITICAL  CARE  attending     Hand off received 					   Pertinent clinical, laboratory, radiographic, hemodynamic, echocardiographic, respiratory data, microbiologic data and chart were reviewed and analyzed frequently throughout the course of the day  Patient seen and examined with CTS/ SH attending at bedside  Pt is a 72 yr old male presented to ER for CP, found to have NSTEMI and tx to [facility] 10/7. Started on heparin gtt with cardiac cath showing 3VD. CB L MCA stroke on 10/9 sp thrombectomy. Planned for PCI. On phenylephrine gtt.  Turned off this AM. Midodrine increased yesterday. Evaluated by EP who deferred intervention until post PCI.       FAMILY HISTORY:  PAST MEDICAL & SURGICAL HISTORY:  H/O inguinal hernia repair        Patient is a 72y old  Male who presents with a chief complaint of Coronary Artery Disease (14 Oct 2022 02:27)      14 system review was unremarkable    Vital signs, hemodynamic and respiratory parameters were reviewed from the bedside nursing flowsheet.  ICU Vital Signs Last 24 Hrs  T(C): 36.1 (14 Oct 2022 09:03), Max: 36.3 (13 Oct 2022 17:51)  T(F): 97 (14 Oct 2022 09:03), Max: 97.3 (13 Oct 2022 17:51)  HR: 49 (14 Oct 2022 08:00) (33 - 49)  BP: 88/49 (14 Oct 2022 08:00) (81/49 - 120/57)  BP(mean): 63 (14 Oct 2022 08:00) (61 - 85)  ABP: --  ABP(mean): --  RR: 18 (14 Oct 2022 08:00) (15 - 20)  SpO2: 100% (14 Oct 2022 08:00) (94% - 100%)    O2 Parameters below as of 14 Oct 2022 08:00  Patient On (Oxygen Delivery Method): room air          Adult Advanced Hemodynamics Last 24 Hrs  CVP(mm Hg): --  CVP(cm H2O): --  CO: --  CI: --  PA: --  PA(mean): --  PCWP: --  SVR: --  SVRI: --  PVR: --  PVRI: --,     Intake and output was reviewed and the fluid balance was calculated  Daily     Daily   I&O's Summary    13 Oct 2022 07:01  -  14 Oct 2022 07:00  --------------------------------------------------------  IN: 1940.2 mL / OUT: 1600 mL / NET: 340.2 mL    14 Oct 2022 07:01  -  14 Oct 2022 09:12  --------------------------------------------------------  IN: 249.5 mL / OUT: 0 mL / NET: 249.5 mL        All lines and drain sites were assessed  Glycemic trend was reviewed        Neuro: 5/5 throughout all extremities, speaking in complete sentences  HEENT: mmm  Heart: s1 s2  Lungs: cta bl  Abdomen: soft nt nd  Extremities: 2+dp      labs  CBC Full  -  ( 14 Oct 2022 02:28 )  WBC Count : 7.20 K/uL  RBC Count : 3.67 M/uL  Hemoglobin : 11.6 g/dL  Hematocrit : 35.4 %  Platelet Count - Automated : 211 K/uL  Mean Cell Volume : 96.5 fl  Mean Cell Hemoglobin : 31.6 pg  Mean Cell Hemoglobin Concentration : 32.8 gm/dL  Auto Neutrophil # : x  Auto Lymphocyte # : x  Auto Monocyte # : x  Auto Eosinophil # : x  Auto Basophil # : x  Auto Neutrophil % : x  Auto Lymphocyte % : x  Auto Monocyte % : x  Auto Eosinophil % : x  Auto Basophil % : x    10-14    140  |  106  |  12  ----------------------------<  111<H>  4.3   |  27  |  1.12    Ca    8.8      14 Oct 2022 02:28  Phos  3.9     10-14  Mg     2.0     10-14    TPro  6.3  /  Alb  3.6  /  TBili  0.6  /  DBili  x   /  AST  21  /  ALT  18  /  AlkPhos  78  10-14    PT/INR - ( 14 Oct 2022 02:28 )   PT: 12.7 sec;   INR: 1.07          PTT - ( 14 Oct 2022 02:28 )  PTT:50.9 sec  The current medications were reviewed   MEDICATIONS  (STANDING):  albumin human 25% IVPB 50 milliLiter(s) IV Intermittent once  atorvastatin 40 milliGRAM(s) Oral at bedtime  chlorhexidine 2% Cloths 1 Application(s) Topical <User Schedule>  clopidogrel Tablet 75 milliGRAM(s) Oral daily  heparin  Infusion 850 Unit(s)/Hr (9.5 mL/Hr) IV Continuous <Continuous>  influenza  Vaccine (HIGH DOSE) 0.7 milliLiter(s) IntraMuscular once  midodrine 20 milliGRAM(s) Oral every 8 hours  pantoprazole    Tablet 40 milliGRAM(s) Oral before breakfast  phenylephrine    Infusion 0.1 MICROgram(s)/kG/Min (2.68 mL/Hr) IV Continuous <Continuous>  senna 2 Tablet(s) Oral at bedtime  sodium chloride 0.9% lock flush 3 milliLiter(s) IV Push every 8 hours    MEDICATIONS  (PRN):  acetaminophen     Tablet .. 650 milliGRAM(s) Oral every 6 hours PRN Temp greater or equal to 38C (100.4F), Mild Pain (1 - 3)      Assessment/Plan:  72 yr old male presented to ER for CP, found to have NSTEMI and tx to [facility] 10/7. Started on heparin gtt with cardiac cath showing 3VD. CB L MCA stroke on 10/9 sp thrombectomy. Planned for PCI. On phenylephrine gtt.     CAD  L MCA stroke sp thrombectomy  Continue heparin  Preop planning  Increase midodrine  Plavix  Diet as tolerated  Replete lytes prn  GI/DVT PPX  Bowel Regimen  Pain control  OOB with PT  Close hemodynamic, ventilatory and drain monitoring and management per post op routine  Monitor for arrhythmias and monitor parameters for organ perfusion  Beta blockade not administered due to hemodynamic instability and bradycardia  Monitor neurologic status  Head of the bed should remain elevated to 45 deg   Chest PT and IS will be encouraged  Monitor adequacy of oxygenation and ventilation and attempt to wean oxygen  Antibiotic regimen will be tailored to the clinical, laboratory and microbiologic data  Nutritional goals will be met using po eventually, ensure adequate caloric intake and monitor the same  Stress ulcer and VTE prophylaxis will be achieved    Glycemic control is satisfactory  Electrolytes have been repleted as necessary and wound care has been carried out   Pain control has been achieved.   Aggressive physical therapy and early mobility and ambulation goals will be met   The family was updated about the course and plan.    CRITICAL CARE TIME personally provided by me  in evaluation and management, reassessments, review and interpretation of labs and x-rays, ventilator and hemodynamic management, formulating a plan and coordinating care: ___60____ MIN.  Time does not include procedural time.    CTICU ATTENDING     					  Napoleon Gould MD

## 2022-10-14 NOTE — PROGRESS NOTE ADULT - SUBJECTIVE AND OBJECTIVE BOX
Patient discussed on morning rounds with Dr. Collins    Operation / Date: No CT surgery upon this admission  s/p cerebral angio w/ thrombectomy 10/9/22    SUBJECTIVE ASSESSMENT: Patient seen and examined at bedside. Patient states that he feels well, offers no complaints. Denies chill, chest pain, SOB, palpitations, N/V.     Vital Signs Last 24 Hrs  T(C): 37.2 (14 Oct 2022 17:18), Max: 37.2 (14 Oct 2022 17:18)  T(F): 98.9 (14 Oct 2022 17:18), Max: 98.9 (14 Oct 2022 17:18)  HR: 46 (14 Oct 2022 15:48) (36 - 55)  BP: 115/57 (14 Oct 2022 15:48) (81/49 - 120/57)  BP(mean): 82 (14 Oct 2022 15:48) (61 - 85)  RR: 16 (14 Oct 2022 15:48) (14 - 20)  SpO2: 97% (14 Oct 2022 15:48) (96% - 100%)    Parameters below as of 14 Oct 2022 15:48  Patient On (Oxygen Delivery Method): room air    I&O's Detail    13 Oct 2022 07:01  -  14 Oct 2022 07:00  --------------------------------------------------------  IN:    Albumin 5%  - 500 mL: 1000 mL    Heparin: 161.5 mL    IV PiggyBack: 500 mL    Oral Fluid: 100 mL    Phenylephrine: 178.7 mL  Total IN: 1940.2 mL    OUT:    Voided (mL): 1600 mL  Total OUT: 1600 mL    Total NET: 340.2 mL    14 Oct 2022 07:01  -  14 Oct 2022 17:48  --------------------------------------------------------  IN:    Heparin: 85.5 mL    Lactated Ringers Bolus: 501 mL    Oral Fluid: 240 mL  Total IN: 826.5 mL    OUT:    Voided (mL): 450 mL  Total OUT: 450 mL    Total NET: 376.5 mL    CHEST TUBE:  None  AIMEE DRAIN:  None  EPICARDIAL WIRES: None  TIE DOWNS: None  BRENNER: None    PHYSICAL EXAM:  GENERAL: NAD, lying in bed comfortably  HEAD: Dressing to R anterior forehead C/D/I. Atraumatic, Normocephalic  EYES: EOMI, PERRLA, conjunctiva and sclera clear  ENT: Moist mucous membranes  NECK: Supple, No JVD  CHEST/LUNG: CTAB; No rales, rhonchi, wheezing, or rubs. Unlabored respirations  HEART: Regular rate and rhythm; No murmurs, rubs, or gallops  ABDOMEN: Bowel sounds present; Soft, Nontender, Nondistended. No hepatomegally  EXTREMITIES:  2+ Peripheral Pulses, brisk capillary refill. No clubbing, cyanosis, or edema  NERVOUS SYSTEM:  Alert & Oriented X3, speech clear. No deficits     LABS:                        11.6   7.20  )-----------( 211      ( 14 Oct 2022 02:28 )             35.4     PT/INR - ( 14 Oct 2022 02:28 )   PT: 12.7 sec;   INR: 1.07        PTT - ( 14 Oct 2022 02:28 )  PTT:50.9 sec    10-14    140  |  106  |  12  ----------------------------<  111<H>  4.3   |  27  |  1.12    Ca    8.8      14 Oct 2022 02:28  Phos  3.9     10-14  Mg     2.0     10-14    TPro  6.3  /  Alb  3.6  /  TBili  0.6  /  DBili  x   /  AST  21  /  ALT  18  /  AlkPhos  78  10-14    MEDICATIONS  (STANDING):  atorvastatin 40 milliGRAM(s) Oral at bedtime  clopidogrel Tablet 75 milliGRAM(s) Oral daily  heparin  Infusion 850 Unit(s)/Hr (9.5 mL/Hr) IV Continuous <Continuous>  influenza  Vaccine (HIGH DOSE) 0.7 milliLiter(s) IntraMuscular once  midodrine 20 milliGRAM(s) Oral every 8 hours  pantoprazole    Tablet 40 milliGRAM(s) Oral before breakfast  senna 2 Tablet(s) Oral at bedtime  sodium chloride 0.9% lock flush 3 milliLiter(s) IV Push every 8 hours    MEDICATIONS  (PRN):  acetaminophen     Tablet .. 650 milliGRAM(s) Oral every 6 hours PRN Temp greater or equal to 38C (100.4F), Mild Pain (1 - 3)    RADIOLOGY & ADDITIONAL TESTS:  < from: Xray Chest 1 View- PORTABLE-Routine (Xray Chest 1 View- PORTABLE-Routine in AM.) (10.14.22 @ 05:21) >  IMPRESSION:    Lungs clear. No infiltrate pleural effusion or pneumothorax. Unremarkable   cardiac silhouette. No acute bone abnormality.    < end of copied text >

## 2022-10-14 NOTE — PROGRESS NOTE ADULT - ASSESSMENT
DOS: 2022  NAME: Ritu Martino   : 1939  PCP: Reyes, Miriam Mercado, MD  CC: Stroke  Referring MD: Dwight Haddad MD    Neurological Problem and Interval History:  83 y.o. right-handed white female with a Hx of electrolyte imbalances mostly hyponatremia for quite some time for which she takes sodium tablets.  However she has been admitted recently from the seventh of this month for urinary tract infection and severe hyponatremia.  The sodium is most probably at her baseline for 130 at this point as per her daughter.  This morning a rapid response was called as she had a sudden episode of eyes fluttering and eyes rolling back and she was not verbally communicative.  She got a dose of intravenous Ativan and then went for a full stroke alert team D protocol which did not show any acute changes except for meningioma.  Patient seems to be improving right now and she knows that she is in the hospital and the month is May and we had Don Ragsdale this last week and and she could tell me correctly the time from the wall clock.  She was also able to get out of bed and ambulate with a gait belt and assistance but it seems that she tends to lose balance tipping over to the left side.  At times she would say nonsensical stuff like asking about her in-laws.  She also thought that we were taking her to the hospital..    Past Medical/Surgical Hx:  Past Medical History:   Diagnosis Date   • Anxiety    • Arthritis    • Bowel dysfunction 2021    since having surgery for diverticular infection   • Diverticulitis 2021    w/ rupture and infection required surgery and ostomy   • Essential hypertension, benign    • GERD (gastroesophageal reflux disease)    • Hernia, hiatal    • Hypercholesterolemia    • Hypokalemia    • Hyponatremia     chronic low with occasional normal level   • Hypothyroidism     estimated time frame pt nor  could remember exactly how long has been on medication   • Insomnia     • Iron deficiency      Past Surgical History:   Procedure Laterality Date   • APPENDECTOMY     • BACK SURGERY     •  SECTION     • COLON SURGERY  2021    to address ruptured and infected diverticular dz, ostomy also placed   • COLOSTOMY CLOSURE  10/2021    ostomy removed   • HEMORRHOIDECTOMY     • KNEE ARTHROPLASTY         Review of Systems:    Constitutional: Pleasant lady currently laying in bed in no distress.  Cardiovascular: No chest pain or palpitations noted.  Respiratory: No shortness of breath noted.  Gastrointestinal: No nausea and vomiting noted.  Genitourinary: Was being treated for urinary tract infections.  Musculoskeletal: No aches and pains in the muscles noted.  Dermatological: No skin breakdown noted.  Neurological: No focal neurological deficits.  However she exhibited acute gait ataxia when we try to walk her.  Psychiatric: Tends to talk tangentially at times.    Ophthalmological: No vision changes.          Medications On Admission  Medications Prior to Admission   Medication Sig Dispense Refill Last Dose   • ALPRAZolam (XANAX) 0.25 MG tablet Take 0.25 mg by mouth 2 (Two) Times a Day.   2022 at 1900   • benazepril (LOTENSIN) 20 MG tablet Take 20 mg by mouth Daily.   2022 at 0800   • Cholecalciferol (VITAMIN D) 2000 units capsule Take  by mouth Daily With Dinner.   2022 at 1700   • cycloSPORINE (RESTASIS) 0.05 % ophthalmic emulsion Administer 1 drop to both eyes Daily.   2022 at Unknown time   • ferrous sulfate 325 (65 FE) MG tablet Take 325 mg by mouth 2 (Two) Times a Day Before Meals.   2022 at 1900   • Probiotic Product (ALIGN PO) Take 1 capsule by mouth Daily With Lunch.   2022 at 1200   • rosuvastatin (CRESTOR) 20 MG tablet Take 20 mg by mouth Every Night.   2022 at 2100   • SODIUM CHLORIDE PO Take  by mouth 3 (Three) Times a Day.   2022 at Unknown time   • Synthroid 50 MCG tablet By mouth take 1 tab daily in AM as directed on empty  stomach with water only.  Brand Medically Necessary 90 tablet 2 5/6/2022 at 0630   • vitamin B-12 (CYANOCOBALAMIN) 100 MCG tablet Take 50 mcg by mouth Daily.   5/6/2022 at 1200   • zolpidem (AMBIEN) 10 MG tablet Take 10 mg by mouth Every Night.   5/6/2022 at 2100   • ketoconazole (NIZORAL) 2 % cream Apply  topically to the appropriate area as directed 2 (Two) Times a Day. to affected area      • nystatin 353013 UNIT/GM powder Apply  topically to the appropriate area as directed 2 (Two) Times a Day. to affected area          Allergies:  No Known Allergies    Social Hx:  Social History     Socioeconomic History   • Marital status:    Tobacco Use   • Smoking status: Never Smoker   • Smokeless tobacco: Never Used   Vaping Use   • Vaping Use: Never used   Substance and Sexual Activity   • Alcohol use: Yes     Alcohol/week: 8.0 standard drinks     Types: 4 Glasses of wine, 4 Shots of liquor per week   • Drug use: No   • Sexual activity: Defer       Family Hx:  History reviewed. No pertinent family history.    Review of Imaging (Interpretation of images not reports): Reviewed the CT angiogram of the head and neck with contrast along with CT perfusion on the PACS that shows the following:  Initially, a noncontrasted CT examination of the brain was performed.  The brain ventricles are symmetrical. There is no evidence of  hemorrhage, hydrocephalus or of a focal area of decreased attenuation to  suggest acute infarction. Confluent areas of decreased attenuation  involving the white matter cerebral hemispheres are noted bilaterally  nonspecific but consistent with moderate to severe small vessel ischemic  disease. Moderate vascular calcification is noted. Dural base mass  overlying the left frontal lobe superiolaterally and posteriorly is  appreciated which is partially calcified. This measures approximately 8  mm in AP dimension, 4 mm in transverse dimension and 10 mm in  craniocaudal dimension.     The above  Assessment and Plan:   · Assessment	  Patient is a 72-year-old male with nicotine dependence who has not followed with physicians regularly, presented to the emergency department with complaint of substernal chest pain radiating to bilateral arms, planned to undergo cardiac bypass this week. Stroke code was called on 10/9 after patient was found on the floor with a large right forehead laceration, aphasic, and with right sided weakness. He was [facility] to [facility] which was negative for hemorrhage but demonstrated a hyperdensity within the proximal L MCA consistent with likely MCA thrombus. CTP significant for 300cc mismatch in L MCA territory. CTA head and neck performed with L MCA occlusion at the origin with absence of flow also noted involving the proximal portion of the left A1 segment. NIHSS 21. Patient deemed not a tPA candidate 2/2 head trauma.     - S/P Successful Thrombectomy.  - Repeat CT head with any acute change in mental status.  - C/W Heparin Gtt withbolus  -PCI ok with neurology if ok with Neruology   -THAO/ or cardiac MRI for LA thrombus and loop recommended on discharge        Stroke team will continue to follow. Discussed with Dr. Moore    information was called to Dr. Miller. Dr. Miller  requested further evaluation with CT perfusion and CT angiography.     CT PERFUSION: There was no evidence of delayed perfusion greater than 6  seconds or of abnormal CBF. A small area of delayed perfusion was  suggested involving the left temporal lobe medially estimated to be  approximately 5 cc in volume. This may be artifactual.     A CT angiogram of the neck and head was performed. Multiplanar as well  as 3-dimensional reconstructions were generated.     The great vessels are arranged in a classic configuration. There is 0%  stenosis involving the carotid bifurcations using NASCET criteria. There  is moderate tortuosity involving the cervical ICAs bilaterally without  stenosis or kink. The proximal aspects of the anterior and middle  cerebral arteries appear unremarkable. Both vertebral arteries were  opacified. The vertebral arteries are codominant. The basilar artery and  left posterior cerebral artery appear unremarkable. There is a fetal  origin of the right posterior cerebral artery. After contrast  administration, there was no evidence of abnormal intra-axial  enhancement. There was enhancement of the dural base mass overlying the  left frontal lobe superolaterally.     IMPRESSION:  No evidence of acute infarction, intracranial hemorrhage or  of abnormal intra-axial enhancement. A small partially calcified dural  based mass is appreciated overlying the left frontal lobe  superolaterally consistent with a meningioma measuring approximately 10  x 8 x 4 mm in size. There was 0% stenosis involving the carotid  bifurcations. There is no evidence of proximal intracranial high-grade  stenosis, aneurysm or occlusion. The CT perfusion demonstrated a 5 cc  questionable area of mildly delayed (4-6 seconds) perfusion involving  the left temporal lobe inferiorly and medially. This may be artifactual.     The noncontrasted CT examination was made available for  Assessment and Plan:   · Assessment	  Patient is a 72-year-old male with nicotine dependence who has not followed with physicians regularly, presented to the emergency department with complaint of substernal chest pain radiating to bilateral arms, planned to undergo cardiac bypass this week. Stroke code was called on 10/9 after patient was found on the floor with a large right forehead laceration, aphasic, and with right sided weakness. He was brought to [facility] which was negative for hemorrhage but demonstrated a hyperdensity within the proximal L MCA consistent with likely MCA thrombus. CTP significant for 300cc mismatch in L MCA territory. CTA head and neck performed with L MCA occlusion at the origin with absence of flow also noted involving the proximal portion of the left A1 segment. NIHSS 21. Patient deemed not a tPA candidate 2/2 head trauma.     - S/P Successful Thrombectomy.  - Repeat CT head with any acute change in mental status.  - C/W AC with Heparin gtt per CTS/NSGY.  -THAO/ or cardiac MRI for LA thrombus and loop recommended on discharge.  - Pt would need CTH non con day  prior to procedure ( Tentative procedure Tues/wed, will need CTH Mon/tues).  - SBP goals per CTS/NSGY       Stroke team will continue to follow. Discussed with Dr. Moore    interpretation  at 1009 hours and a preliminary report called to Dr. Miller at 1010  hours. The CT angiogram was made available for interpretation at 1022  hours and the preliminary report given to Dr. Miller at 1025 hours.           AI analysis of LVO was utilized.      Additional Tests Performed: Portable chest x-ray was performed that shows the following:    FINDINGS:    Lungs:  Calcified granuloma measuring 0.8 cm at the periphery of the   left midlung.    Pleural space:  Unremarkable.  No pneumothorax.    Heart:  Cardiomegaly.    Mediastinum:  Unremarkable.    Bones joints:  Osteopenia.    Soft tissues:  Soft tissues are unremarkable.    Vasculature:  Minimal atherosclerotic disease of the aortic knob.    Other findings:  Mild hypoaeration.     IMPRESSION:       1.  Mild cardiomegaly.  2.  Hypoaeration.  3.  Osteopenia.      Laboratory Results:   Lab Results   Component Value Date    GLUCOSE 129 (H) 05/09/2022    CALCIUM 9.8 05/09/2022     (L) 05/09/2022    K 4.0 05/09/2022    CO2 16.5 (L) 05/09/2022    CL 97 (L) 05/09/2022    BUN 36 (H) 05/09/2022    CREATININE 0.55 (L) 05/09/2022    EGFRIFNONA 66 03/21/2019    BCR 65.5 (H) 05/09/2022    ANIONGAP 16.5 (H) 05/09/2022     Lab Results   Component Value Date    WBC 7.86 05/09/2022    HGB 14.4 05/09/2022    HCT 40.0 05/09/2022    MCV 91.3 05/09/2022     05/09/2022     Lab Results   Component Value Date    CHOL 149 05/07/2022     Lab Results   Component Value Date    HDL 99 (H) 05/07/2022     Lab Results   Component Value Date    LDL 39 05/07/2022     Lab Results   Component Value Date    TRIG 49 05/07/2022     Lab Results   Component Value Date    HGBA1C 5.00 05/07/2022     Lab Results   Component Value Date    INR 0.87 (L) 05/07/2022    INR 1.0 12/12/2014    PROTIME 11.7 05/07/2022    PROTIME 10.9 12/12/2014         Physical Examination:  /90 (BP Location: Right arm, Patient Position: Lying)   Pulse 112   Temp 97 °F (36.1 °C) (Oral)    "Resp 22   Ht 152.4 cm (60\")   Wt 58 kg (127 lb 13.9 oz)   SpO2 98%   BMI 24.97 kg/m²   General Appearance:   Well developed, well nourished, well groomed, alert, and cooperative.  HEENT: Normocephalic.  Normal fundoscopic exam including normal retina, discs are flat with sharp margins, normal vasculature.  Neck and Spine: Normal range of motion.  Normal alignment. No mass or tenderness. No bruits.  Cardiac: Regular rate and rhythm. No murmurs.  Peripheral Vasculature: Radial and pedal pulses are equal and symmetric. No signs of distal embolization.  Extremities:    No edema or deformities. Normal joint ROM. CASTRO hoses and SCD's in place  Skin:    No rashes or birth marks.    Neurological examination:  Higher Integrative  Function: Oriented to time, place and person.  She was able to tell me that she is in the hospital and the month was May and we had Don Ragsdale this weekend and also able to tell me correctly that time from the wall clock.  She is also able to follow simple commands well but she tends to talk tangentially at times..  CN II: Pupils are equal, round, and reactive to light. Normal visual acuity and visual fields.    CN III IV VI: Extraocular movements are full without nystagmus.   CN V: Normal facial sensation and strength of muscles of mastication.  CN VII: Facial movements are symmetric. No weakness.  CN VIII:   Auditory acuity is normal.  CN IX & X:   Symmetric palatal movement.  CN XI: Sternocleidomastoid and trapezius are normal.  No weakness.  CN XII:   The tongue is midline.  No atrophy or fasciculations.  Motor: Normal muscle strength, bulk and tone in upper and lower extremities.  No fasciculations, rigidity, spasticity, or abnormal movements.  Reflexes: 2+ in the upper and lower extremities. Plantar responses are flexor.  Sensation: Normal to light touch, pinprick, vibration, temperature, and proprioception in arms and legs. Normal graphesthesia and no extinction on DSS.  Station and " Gait: She was able to sit up by the side of the bed independently and with the help of a gait belt and assistance she only walked a short distance as she was tending to lose balance and fall to the left side.  For that reason Romberg testing and Carroll balance testing could not be performed..    Coordination: Finger to nose test shows no dysmetria.    Heel to shin normal.      Diagnoses / Discussion:  83 y.o. who presents with Sx of seizure activity which was noted by the nursing staff.  She got a dose of intravenous Ativan which tend to bring her out of it.  Currently she is drowsy but she was able to cooperate with a neurological examination.  She was also noted to be talking tangentially at times.    Plan:  MRI of the brain with and without contrast to define the meningioma as well as to look for any seizure focus.    EEG has also been requested.    Patient is claustrophobic and so we will administer IV Ativan 1 to 2 mg prior to the MRI.    It seems that the patient has been dependent on Xanax for quite some time and also tends to become hyponatremic and recently had a urinary tract infection which poses a remarkable challenge to the brain and puts her at a high risk of seizures.  If correction of the electrolyte imbalance and neuroimaging studies are unremarkable and the EEG is unremarkable, patient does not need to be on any anticonvulsant as she will have both side effects from them.  However I have requested that she should not be driving or using any hazardous equipment or engaging in any hazardous activity until seizure-free for the next 90 days..     I have discussed the above with the patient and family.  She will be followed up by our inpatient neurology team.  Time spent with patient: 60min    Coding    Dictated using Dragon dictation.

## 2022-10-14 NOTE — PROGRESS NOTE ADULT - ASSESSMENT
71 YO Male, current smoker, with no known PMHx originally presented to [facility] c/o 2 episodes of chest pain. Patient states that on 10/3, while at rest, he began to experience substernal chest pain w/ radiation to bilateral upper extremities, lasted about 15 minutes and resolved with Aspirin. On 10/6 patient experienced another episode of similar chest pain that again resolved with Aspirin, prompting patient to go to the ED. While in [facility] ED, patient's troponin was elevated and EKG was noted to have inverted T-waves in the lateral leads. Patient given Plavix load and started on heparin gtt. On 10/7 patient underwent cardiac cath that revealed 90% mLAD, 95% pLCX, 75% mRCA. Patient was transferred to [facility] under the care of Dr. Collins for further work-up and intervention. TTE significant for EF of 15-20%. Patient was undergoing pre-operative work-up for CABG, when on 10/9/22 patient found down w/ R-sided plegia & aphasia, stroke code called and pt found to have acute ischemic MCA stroke on CT imaging, no hemorrhage. Patient underwent cerebral angio w/ thrombectomy with neurosurgery on 10/9/22. Patient recovered in CTICU. On 10/10/22 patient was able to move R side, expressive aphasia as well, pt worked w/ PT/OT/S&S. Henry given for low BP, flores to 30s, given . Hematoma noted in R groin, vascular consulted. TTE negative for thrombus. 10/11 CT head repeated and revealed evolving infarct with no hemorrhage. 10/12 Henry given, Dopamine given and weaned off, midodrine started. HF consulted, arterial line d/c'd. Motor function continued to improve. 10/13 EP consulted, midodrine increased. 10/14 Pt asx and flores to high 20s, henry off. Pt transferred to [facility].     Plan:    Neurovascular:   -Acute ischemic MCA stroke s/p cerebral angio w/ thrombectomy on 10/9/22  -Pain well controlled with current regimen. PRN's: Tylenol 650mg PO Q6H PRN mild pain    Cardiovascular:   -3v CAD   -Pre-op work-up for CABG, tentative schedule for Tuesday 10/11/22.  -Cont Lipitor and Aspirin  -Toprol held 2/2 asymptomatic bradycardia to 40/50s  -Cont Imdur 30mg QD  -Will transition therapeutic LVX to heparin gtt today in preparation for OR  -Hemodynamically stable.   -Monitor: BP, HR, tele    Respiratory:   -Current, 1/2 ppd daily smoker x30-40 years  -F/u CT chest noncontrast  -Will obtain room air ABG  -Oxygenating well on room air  -Encourage continued use of IS 10x/hr and frequent ambulation  -CXR: stable    GI:  -GI PPX: Protonix  -PO Diet  -Bowel Regimen: Senna     Renal / :  -Continue to monitor renal function: BUN/Cr:   -Monitor I/O's daily     Endocrine:    -No hx of DM or thyroid dx  -A1c: 5.4  -TSH: 2.29    Hematologic:  -CBC: H/H- 13.4/39.9  -Coagulation Panel.    ID:  -Temperature: Afebrile   -CBC: WBC- 6.92  -Continue to observe for SIRS/Sepsis Syndrome.    Prophylaxis:  -Start heparin gtt  -Continue with SCD's b/l while patient is at rest     Disposition:  -Plan for possible PCI next week     71 YO Male, current smoker, with no known PMHx originally presented to [facility] c/o 2 episodes of chest pain. Patient states that on 10/3, while at rest, he began to experience substernal chest pain w/ radiation to bilateral upper extremities, lasted about 15 minutes and resolved with Aspirin. On 10/6 patient experienced another episode of similar chest pain that again resolved with Aspirin, prompting patient to go to the ED. While in [facility] ED, patient's troponin was elevated and EKG was noted to have inverted T-waves in the lateral leads. Patient given Plavix load and started on heparin gtt. On 10/7 patient underwent cardiac cath that revealed 90% mLAD, 95% pLCX, 75% mRCA. Patient was transferred to [facility] under the care of Dr. Collins for further work-up and intervention. TTE significant for EF of 15-20%. Patient was undergoing pre-operative work-up for CABG, when on 10/9/22 patient found down w/ R-sided plegia & aphasia, stroke code called and pt found to have acute ischemic MCA stroke on CT imaging, no hemorrhage. Patient underwent cerebral angio w/ thrombectomy with neurosurgery on 10/9/22. Patient recovered in CTICU. On 10/10/22 patient was able to move R side, expressive aphasia as well, pt worked w/ PT/OT/S&S. Henry given for low BP, flores to 30s, given . Hematoma noted in R groin, vascular consulted. TTE negative for thrombus. 10/11 CT head repeated and revealed evolving infarct with no hemorrhage. 10/12 Henry given, Dopamine given and weaned off, midodrine started. HF consulted, arterial line d/c'd. Motor function continued to improve. 10/13 EP consulted, midodrine increased. 10/14 Pt asx and flores to high 20s, henry weaned off. Pt transferred to [facility].     Plan:    Neurovascular:   -Acute ischemic MCA stroke s/p cerebral angio w/ thrombectomy on 10/9/22  -Will need neuro clearance prior to PCI next week  -Neuro recs appreciated  -Cont plavix and statin  -Pain well controlled with current regimen. PRN's: Tylenol 650mg PO Q6H PRN mild pain    Cardiovascular:   -3v CAD   -Patient no longer a surgical candidate for CABG  -F/u MR viability  -Transfer to cardiac service for possible PCI next week  -EP consulted for possible PPM placement next week.  -Cont Plavix, Lipitor and Hep gtt  -Hypotension and Bradycardia (to high 20s today)  -Cont Midodrine, wean as tolerated.   -EP and HF following. Possible PPM next week.  -Hemodynamically stable.   -Monitor: BP, HR, tele    Respiratory:   -Current, 1/2 ppd daily smoker x30-40 years  -Oxygenating well on room air  -Encourage continued use of IS 10x/hr and frequent ambulation  -CXR: stable    GI:  -GI PPX: Protonix  -PO Diet  -Bowel Regimen: Senna     Renal / :  -Continue to monitor renal function: BUN/Cr: 12/.12  -Monitor I/O's daily     Endocrine:    -No hx of DM or thyroid dx  -A1c: 5.4  -TSH: 2.29    Hematologic:  -CBC: H/H- 11.6/35.4  -Coagulation Panel.    ID:  -Temperature: Afebrile   -CBC: WBC- 7.20  -Continue to observe for SIRS/Sepsis Syndrome.    Prophylaxis:  -Cont heparin gtt  -Continue with SCD's b/l while patient is at rest     Disposition:  -Plan for possible PCI next week

## 2022-10-14 NOTE — PROGRESS NOTE ADULT - SUBJECTIVE AND OBJECTIVE BOX
Neurology Stroke Progress Note    INTERVAL HPI/OVERNIGHT EVENTS:  Patient seen and examined @ bedside. No significant O/N events. Noted to be sitting @ bedside chair, engaging in conversation.     MEDICATIONS  (STANDING):  atorvastatin 40 milliGRAM(s) Oral at bedtime  clopidogrel Tablet 75 milliGRAM(s) Oral daily  heparin  Infusion 850 Unit(s)/Hr (9.5 mL/Hr) IV Continuous <Continuous>  influenza  Vaccine (HIGH DOSE) 0.7 milliLiter(s) IntraMuscular once  lactated ringers Bolus 500 milliLiter(s) IV Bolus once  midodrine 20 milliGRAM(s) Oral every 8 hours  pantoprazole    Tablet 40 milliGRAM(s) Oral before breakfast  senna 2 Tablet(s) Oral at bedtime  sodium chloride 0.9% lock flush 3 milliLiter(s) IV Push every 8 hours    MEDICATIONS  (PRN):  acetaminophen     Tablet .. 650 milliGRAM(s) Oral every 6 hours PRN Temp greater or equal to 38C (100.4F), Mild Pain (1 - 3)      Allergies    No Known Allergies    Intolerances      Vital Signs Last 24 Hrs  T(C): 36.1 (14 Oct 2022 09:03), Max: 36.3 (13 Oct 2022 17:51)  T(F): 97 (14 Oct 2022 09:03), Max: 97.3 (13 Oct 2022 17:51)  HR: 55 (14 Oct 2022 12:00) (35 - 55)  BP: 110/53 (14 Oct 2022 12:00) (81/49 - 120/57)  BP(mean): 77 (14 Oct 2022 12:00) (61 - 85)  RR: 16 (14 Oct 2022 12:00) (15 - 20)  SpO2: 97% (14 Oct 2022 12:00) (96% - 100%)    Parameters below as of 14 Oct 2022 12:00  Patient On (Oxygen Delivery Method): room air        Physical exam:  General: No acute distress, awake and alert  Eyes: Anicteric sclerae, moist conjunctivae, see below for CNs  Neck: trachea midline, FROM.  Cardiovascular: Regular rate and rhythm on the monitor.      Neurologic:  -Mental status: Awake, alert, oriented to person, place, and time. Speech is fluent with intact naming, repetition, and comprehension, no dysarthria. Recent and remote memory intact. Follows commands. Attention/concentration intact. Fund of knowledge appropriate.  -Cranial nerves:   II: Visual fields are full to confrontation.  III, IV, VI:  Pupils equally round and reactive to light, Primary gaze midline, no gaze preferance appreciated, able to cross midline, but unable to bury completely when looking to extreme sides B/L.  V:  Facial sensation V1-V3 equal and intact   VII: Mild R NLFF +.  VIII: Hearing is bilaterally intact to finger rub  IX, X: Uvula is midline and soft palate rises symmetrically  XI: Head turning and shoulder shrug are intact.  XII: Tongue protrudes midline  Motor: Normal bulk and tone. Subtle R pronator drift +, RUE : 4+/5 with good  strength 5/5. LUE/LLE, RLE : 5/5. Strength bilateral upper extremity 5/5, bilateral lower extremities 5/5.  Rapid alternating movements intact and symmetric  Sensation: Intact to light touch bilaterally. No neglect or extinction on double simultaneous testing.  Coordination: No dysmetria on finger-to-nose out of proportion to weakness.  Reflexes: Downgoing toes bilaterally   Gait: Narrow gait and steady    LABS:                        11.6   7.20  )-----------( 211      ( 14 Oct 2022 02:28 )             35.4     10-14    140  |  106  |  12  ----------------------------<  111<H>  4.3   |  27  |  1.12    Ca    8.8      14 Oct 2022 02:28  Phos  3.9     10-14  Mg     2.0     10-14    TPro  6.3  /  Alb  3.6  /  TBili  0.6  /  DBili  x   /  AST  21  /  ALT  18  /  AlkPhos  78  10-14    PT/INR - ( 14 Oct 2022 02:28 )   PT: 12.7 sec;   INR: 1.07          PTT - ( 14 Oct 2022 02:28 )  PTT:50.9 sec      RADIOLOGY & ADDITIONAL TESTS: reviewed.

## 2022-10-14 NOTE — PROGRESS NOTE ADULT - SUBJECTIVE AND OBJECTIVE BOX
INTERVAL COURSE/OVERNIGHT EVENTS  Remained in sinus flores/ evaluated by EP with no plan for PPM as of yet  Asymptomatic/ denies any complaints/ neuro exam improved, aphasia better  Midodrine increase/ remained on 0.5 of Henry to maintain MAPs> 70 mmhg/ Bradycardic 38-40      VITALS  Vital Signs Last 24 Hrs  T(C): 36.1 (14 Oct 2022 01:32), Max: 36.3 (13 Oct 2022 17:51)  T(F): 97 (14 Oct 2022 01:32), Max: 97.3 (13 Oct 2022 17:51)  HR: 39 (14 Oct 2022 02:00) (33 - 68)  BP: 101/54 (14 Oct 2022 02:00) (87/45 - 151/62)  BP(mean): 75 (14 Oct 2022 02:00) (63 - 89)  RR: 18 (14 Oct 2022 02:00) (16 - 20)  SpO2: 99% (14 Oct 2022 02:00) (94% - 100%)    Parameters below as of 14 Oct 2022 03:00  Patient On (Oxygen Delivery Method): room air    PHYSICAL EXAM  General: A&Ox 3; NAD  Respiratory: CTA B/L  Cardiovascular: bradycardic ~ 40 s in sinus   Gastrointestinal: Soft; NTND  Extremities: WWP; no edema  Neurological: Non focal/ aphasia improving     MEDICATIONS  (STANDING):  albumin human  5% IVPB 500 milliLiter(s) IV Intermittent once  albumin human 25% IVPB 50 milliLiter(s) IV Intermittent once  atorvastatin 40 milliGRAM(s) Oral at bedtime  chlorhexidine 2% Cloths 1 Application(s) Topical <User Schedule>  clopidogrel Tablet 75 milliGRAM(s) Oral daily  heparin  Infusion 850 Unit(s)/Hr (9.5 mL/Hr) IV Continuous <Continuous>  influenza  Vaccine (HIGH DOSE) 0.7 milliLiter(s) IntraMuscular once  midodrine 20 milliGRAM(s) Oral every 8 hours  pantoprazole    Tablet 40 milliGRAM(s) Oral before breakfast  phenylephrine    Infusion 0.1 MICROgram(s)/kG/Min (2.68 mL/Hr) IV Continuous <Continuous>  senna 2 Tablet(s) Oral at bedtime  sodium chloride 0.9% lock flush 3 milliLiter(s) IV Push every 8 hours    MEDICATIONS  (PRN):  acetaminophen     Tablet .. 650 milliGRAM(s) Oral every 6 hours PRN Temp greater or equal to 38C (100.4F), Mild Pain (1 - 3)      LABS                        12.8   7.26  )-----------( 208      ( 13 Oct 2022 09:45 )             38.2     10-13    138  |  104  |  11  ----------------------------<  112<H>  4.6   |  25  |  1.00    Ca    9.4      13 Oct 2022 09:45  Phos  3.7     10-13  Mg     2.7     10-13    TPro  7.1  /  Alb  4.2  /  TBili  1.2  /  DBili  x   /  AST  23  /  ALT  20  /  AlkPhos  73  10-13    LIVER FUNCTIONS - ( 13 Oct 2022 09:45 )  Alb: 4.2 g/dL / Pro: 7.1 g/dL / ALK PHOS: 73 U/L / ALT: 20 U/L / AST: 23 U/L / GGT: x           PT/INR - ( 13 Oct 2022 09:45 )   PT: 12.9 sec;   INR: 1.08          PTT - ( 13 Oct 2022 09:45 )  PTT:51.3 sec    IMAGING/EKG/ETC  Reviewed.  INTERVAL COURSE/OVERNIGHT EVENTS  Remained in sinus flores/ evaluated by EP with no plan for PPM as of yet  Asymptomatic/ denies any complaints/ neuro exam improved, aphasia better  Midodrine increase/ remained on 0.5 of Henry to maintain MAPs> 70 mmhg/ Bradycardic 38-40      VITALS  Vital Signs Last 24 Hrs  T(C): 36.1 (14 Oct 2022 01:32), Max: 36.3 (13 Oct 2022 17:51)  T(F): 97 (14 Oct 2022 01:32), Max: 97.3 (13 Oct 2022 17:51)  HR: 39 (14 Oct 2022 02:00) (33 - 68)  BP: 101/54 (14 Oct 2022 02:00) (87/45 - 151/62)  BP(mean): 75 (14 Oct 2022 02:00) (63 - 89)  RR: 18 (14 Oct 2022 02:00) (16 - 20)  SpO2: 99% (14 Oct 2022 02:00) (94% - 100%)    Parameters below as of 14 Oct 2022 03:00  Patient On (Oxygen Delivery Method): room air    PHYSICAL EXAM  General: A&Ox 3; NAD  Respiratory: CTA B/L  Cardiovascular: bradycardic ~ 40 s in sinus   Gastrointestinal: Soft; NTND  Extremities: WWP; no edema, groin hematoma unchanged   Neurological: Non focal/ aphasia improving     MEDICATIONS  (STANDING):  albumin human  5% IVPB 500 milliLiter(s) IV Intermittent once  albumin human 25% IVPB 50 milliLiter(s) IV Intermittent once  atorvastatin 40 milliGRAM(s) Oral at bedtime  chlorhexidine 2% Cloths 1 Application(s) Topical <User Schedule>  clopidogrel Tablet 75 milliGRAM(s) Oral daily  heparin  Infusion 850 Unit(s)/Hr (9.5 mL/Hr) IV Continuous <Continuous>  influenza  Vaccine (HIGH DOSE) 0.7 milliLiter(s) IntraMuscular once  midodrine 20 milliGRAM(s) Oral every 8 hours  pantoprazole    Tablet 40 milliGRAM(s) Oral before breakfast  phenylephrine    Infusion 0.1 MICROgram(s)/kG/Min (2.68 mL/Hr) IV Continuous <Continuous>  senna 2 Tablet(s) Oral at bedtime  sodium chloride 0.9% lock flush 3 milliLiter(s) IV Push every 8 hours    MEDICATIONS  (PRN):  acetaminophen     Tablet .. 650 milliGRAM(s) Oral every 6 hours PRN Temp greater or equal to 38C (100.4F), Mild Pain (1 - 3)      LABS                        12.8   7.26  )-----------( 208      ( 13 Oct 2022 09:45 )             38.2     10-13    138  |  104  |  11  ----------------------------<  112<H>  4.6   |  25  |  1.00    Ca    9.4      13 Oct 2022 09:45  Phos  3.7     10-13  Mg     2.7     10-13    TPro  7.1  /  Alb  4.2  /  TBili  1.2  /  DBili  x   /  AST  23  /  ALT  20  /  AlkPhos  73  10-13    LIVER FUNCTIONS - ( 13 Oct 2022 09:45 )  Alb: 4.2 g/dL / Pro: 7.1 g/dL / ALK PHOS: 73 U/L / ALT: 20 U/L / AST: 23 U/L / GGT: x           PT/INR - ( 13 Oct 2022 09:45 )   PT: 12.9 sec;   INR: 1.08          PTT - ( 13 Oct 2022 09:45 )  PTT:51.3 sec    IMAGING/EKG/ETC  Reviewed.

## 2022-10-14 NOTE — PROGRESS NOTE ADULT - SUBJECTIVE AND OBJECTIVE BOX
INTERVAL EVENTS:  denies cp/sob/lightheadedness  has been walking in hallway  intermittently on aleena gtt, now off, pending stepdown      MEDICATIONS  (STANDING):  atorvastatin 40 milliGRAM(s) Oral at bedtime  clopidogrel Tablet 75 milliGRAM(s) Oral daily  heparin  Infusion 850 Unit(s)/Hr (9.5 mL/Hr) IV Continuous <Continuous>  influenza  Vaccine (HIGH DOSE) 0.7 milliLiter(s) IntraMuscular once  lactated ringers Bolus 500 milliLiter(s) IV Bolus once  midodrine 20 milliGRAM(s) Oral every 8 hours  pantoprazole    Tablet 40 milliGRAM(s) Oral before breakfast  senna 2 Tablet(s) Oral at bedtime  sodium chloride 0.9% lock flush 3 milliLiter(s) IV Push every 8 hours    MEDICATIONS  (PRN):  acetaminophen     Tablet .. 650 milliGRAM(s) Oral every 6 hours PRN Temp greater or equal to 38C (100.4F), Mild Pain (1 - 3)      Home Medications:      Vital Signs Last 24 Hrs  T(C): 36.1 (14 Oct 2022 14:54), Max: 36.3 (13 Oct 2022 17:51)  T(F): 97 (14 Oct 2022 14:54), Max: 97.3 (13 Oct 2022 17:51)  HR: 55 (14 Oct 2022 12:00) (36 - 55)  BP: 110/53 (14 Oct 2022 12:00) (81/49 - 120/57)  BP(mean): 77 (14 Oct 2022 12:00) (61 - 85)  RR: 16 (14 Oct 2022 12:00) (15 - 20)  SpO2: 97% (14 Oct 2022 12:00) (96% - 100%)    Parameters below as of 14 Oct 2022 12:00  Patient On (Oxygen Delivery Method): room air         PHYSICAL EXAM:  NAD  No JVD  Regular, bradycardic, no m/r/g  CTAB  No LE edema      LABS:                        11.6   7.20  )-----------( 211      ( 14 Oct 2022 02:28 )             35.4     10-14    140  |  106  |  12  ----------------------------<  111<H>  4.3   |  27  |  1.12    Ca    8.8      14 Oct 2022 02:28  Phos  3.9     10-14  Mg     2.0     10-14    TPro  6.3  /  Alb  3.6  /  TBili  0.6  /  DBili  x   /  AST  21  /  ALT  18  /  AlkPhos  78  10-14        PT/INR - ( 14 Oct 2022 02:28 )   PT: 12.7 sec;   INR: 1.07          PTT - ( 14 Oct 2022 02:28 )  PTT:50.9 sec    I&O's Summary    13 Oct 2022 07:01  -  14 Oct 2022 07:00  --------------------------------------------------------  IN: 1940.2 mL / OUT: 1600 mL / NET: 340.2 mL    14 Oct 2022 07:01  -  14 Oct 2022 15:08  --------------------------------------------------------  IN: 612 mL / OUT: 450 mL / NET: 162 mL    A/P:  72M nicotine dependence no known PMH (doesn’t follow doctors) presented on 10/7 with intermittent CP x5d, found with NSTEMI 3V CAD, course c/b syncopal episode (found on floor with R forehead lac, aphasia, R weakness) while awaiting cabg, found w/ L M1 CVA on 10/9 s/p mech aspiration thrombectomy c/b R groin hematoma. Briefly on aleena for BP support in ludwin-infarct period of CVA. Had been on dopamine for HR support (HR range 30s-50s), asymptomatic.    #HFrEF/ICM:  - GDMT: agree with weaning dopamine and aleena. Would wean midodrine if able to from neuro perspective. Will consider sglt2 and MRA prior to dc.  - Volume: euvolemic, off diuretics  - Etiology: ischemic. Reasonable to pursue cMRI for viability to determine whether benefit of revascularization outweighs benefit (bleeding risk w/ heparin during LHC), and if so, will discuss timing with interventional cardiology.  - Device: not candidate at this point    d/w HF attending  Job Reynoso MD PGY6

## 2022-10-14 NOTE — PROGRESS NOTE ADULT - SUBJECTIVE AND OBJECTIVE BOX
EPS Progress Note  CC: chest pain     S: No new complaints or event overnight.   Stepped down to [facility].   Off Phenylephrine.       TELE:  Sinus rhythm. Rate improves slightly mid 40s now.  When he stood up, HR in 50s.       O: T(C): 36.1 (10-14-22 @ 14:54), Max: 36.3 (10-13-22 @ 17:51)  HR: 46 (10-14-22 @ 15:48) (36 - 55)  BP: 115/57 (10-14-22 @ 15:48) (81/49 - 120/57)  RR: 16 (10-14-22 @ 15:48) (15 - 20)  SpO2: 97% (10-14-22 @ 15:48) (96% - 100%)      PHYSICAL  Constitutional:  NAD        Pulm:  CTA B/L, no wheeze or rale   Cardiac:   + s1/s2, RRR  GI:  +BS , soft ND/NT  Vascular: No LE edema, pulse 2+  Neuro: AAO x 3. no focal deficit  Skin: Warm. No rash or lesion     LABS:                        11.6   7.20  )-----------( 211      ( 14 Oct 2022 02:28 )             35.4     10-14    140  |  106  |  12  ----------------------------<  111<H>  4.3   |  27  |  1.12    Ca    8.8      14 Oct 2022 02:28  Phos  3.9     10-14  Mg     2.0     10-14    TPro  6.3  /  Alb  3.6  /  TBili  0.6  /  DBili  x   /  AST  21  /  ALT  18  /  AlkPhos  78  10-14    PT/INR - ( 14 Oct 2022 02:28 )   PT: 12.7 sec;   INR: 1.07          PTT - ( 14 Oct 2022 02:28 )  PTT:50.9 sec    MEDICATIONS:  acetaminophen     Tablet .. 650 milliGRAM(s) Oral every 6 hours PRN  atorvastatin 40 milliGRAM(s) Oral at bedtime  clopidogrel Tablet 75 milliGRAM(s) Oral daily  heparin  Infusion 850 Unit(s)/Hr IV Continuous <Continuous>  influenza  Vaccine (HIGH DOSE) 0.7 milliLiter(s) IntraMuscular once  lactated ringers Bolus 500 milliLiter(s) IV Bolus once  midodrine 20 milliGRAM(s) Oral every 8 hours  pantoprazole    Tablet 40 milliGRAM(s) Oral before breakfast  senna 2 Tablet(s) Oral at bedtime  sodium chloride 0.9% lock flush 3 milliLiter(s) IV Push every 8 hours

## 2022-10-15 DIAGNOSIS — F17.200 NICOTINE DEPENDENCE, UNSPECIFIED, UNCOMPLICATED: ICD-10-CM

## 2022-10-15 DIAGNOSIS — R10.30 LOWER ABDOMINAL PAIN, UNSPECIFIED: ICD-10-CM

## 2022-10-15 DIAGNOSIS — R00.1 BRADYCARDIA, UNSPECIFIED: ICD-10-CM

## 2022-10-15 DIAGNOSIS — Z86.73 PERSONAL HISTORY OF TRANSIENT ISCHEMIC ATTACK (TIA), AND CEREBRAL INFARCTION WITHOUT RESIDUAL DEFICITS: ICD-10-CM

## 2022-10-15 DIAGNOSIS — I50.21 ACUTE SYSTOLIC (CONGESTIVE) HEART FAILURE: ICD-10-CM

## 2022-10-15 DIAGNOSIS — I21.4 NON-ST ELEVATION (NSTEMI) MYOCARDIAL INFARCTION: ICD-10-CM

## 2022-10-15 LAB
ANION GAP SERPL CALC-SCNC: 8 MMOL/L — SIGNIFICANT CHANGE UP (ref 5–17)
APTT BLD: 53.5 SEC — HIGH (ref 27.5–35.5)
BUN SERPL-MCNC: 9 MG/DL — SIGNIFICANT CHANGE UP (ref 7–23)
CALCIUM SERPL-MCNC: 9.1 MG/DL — SIGNIFICANT CHANGE UP (ref 8.4–10.5)
CHLORIDE SERPL-SCNC: 108 MMOL/L — SIGNIFICANT CHANGE UP (ref 96–108)
CO2 SERPL-SCNC: 26 MMOL/L — SIGNIFICANT CHANGE UP (ref 22–31)
CORTIS AM PEAK SERPL-MCNC: 5.41 UG/DL — LOW (ref 6.02–18.4)
CREAT SERPL-MCNC: 1.03 MG/DL — SIGNIFICANT CHANGE UP (ref 0.5–1.3)
EGFR: 78 ML/MIN/1.73M2 — SIGNIFICANT CHANGE UP
GLUCOSE BLDC GLUCOMTR-MCNC: 100 MG/DL — HIGH (ref 70–99)
GLUCOSE BLDC GLUCOMTR-MCNC: 111 MG/DL — HIGH (ref 70–99)
GLUCOSE BLDC GLUCOMTR-MCNC: 99 MG/DL — SIGNIFICANT CHANGE UP (ref 70–99)
GLUCOSE SERPL-MCNC: 95 MG/DL — SIGNIFICANT CHANGE UP (ref 70–99)
HCT VFR BLD CALC: 32.7 % — LOW (ref 39–50)
HGB BLD-MCNC: 11 G/DL — LOW (ref 13–17)
INR BLD: 1.08 — SIGNIFICANT CHANGE UP (ref 0.88–1.16)
MAGNESIUM SERPL-MCNC: 2 MG/DL — SIGNIFICANT CHANGE UP (ref 1.6–2.6)
MCHC RBC-ENTMCNC: 32 PG — SIGNIFICANT CHANGE UP (ref 27–34)
MCHC RBC-ENTMCNC: 33.6 GM/DL — SIGNIFICANT CHANGE UP (ref 32–36)
MCV RBC AUTO: 95.1 FL — SIGNIFICANT CHANGE UP (ref 80–100)
NRBC # BLD: 0 /100 WBCS — SIGNIFICANT CHANGE UP (ref 0–0)
PHOSPHATE SERPL-MCNC: 3.2 MG/DL — SIGNIFICANT CHANGE UP (ref 2.5–4.5)
PLATELET # BLD AUTO: 193 K/UL — SIGNIFICANT CHANGE UP (ref 150–400)
POTASSIUM SERPL-MCNC: 4.2 MMOL/L — SIGNIFICANT CHANGE UP (ref 3.5–5.3)
POTASSIUM SERPL-SCNC: 4.2 MMOL/L — SIGNIFICANT CHANGE UP (ref 3.5–5.3)
PROTHROM AB SERPL-ACNC: 12.9 SEC — SIGNIFICANT CHANGE UP (ref 10.5–13.4)
RBC # BLD: 3.44 M/UL — LOW (ref 4.2–5.8)
RBC # FLD: 13.4 % — SIGNIFICANT CHANGE UP (ref 10.3–14.5)
SODIUM SERPL-SCNC: 142 MMOL/L — SIGNIFICANT CHANGE UP (ref 135–145)
WBC # BLD: 6.34 K/UL — SIGNIFICANT CHANGE UP (ref 3.8–10.5)
WBC # FLD AUTO: 6.34 K/UL — SIGNIFICANT CHANGE UP (ref 3.8–10.5)

## 2022-10-15 PROCEDURE — 99232 SBSQ HOSP IP/OBS MODERATE 35: CPT | Mod: GC

## 2022-10-15 PROCEDURE — 93010 ELECTROCARDIOGRAM REPORT: CPT

## 2022-10-15 PROCEDURE — 99232 SBSQ HOSP IP/OBS MODERATE 35: CPT

## 2022-10-15 RX ORDER — MIDODRINE HYDROCHLORIDE 2.5 MG/1
15 TABLET ORAL THREE TIMES A DAY
Refills: 0 | Status: DISCONTINUED | OUTPATIENT
Start: 2022-10-15 | End: 2022-10-17

## 2022-10-15 RX ADMIN — MIDODRINE HYDROCHLORIDE 20 MILLIGRAM(S): 2.5 TABLET ORAL at 12:41

## 2022-10-15 RX ADMIN — CLOPIDOGREL BISULFATE 75 MILLIGRAM(S): 75 TABLET, FILM COATED ORAL at 12:41

## 2022-10-15 RX ADMIN — MIDODRINE HYDROCHLORIDE 20 MILLIGRAM(S): 2.5 TABLET ORAL at 03:20

## 2022-10-15 RX ADMIN — MIDODRINE HYDROCHLORIDE 15 MILLIGRAM(S): 2.5 TABLET ORAL at 22:48

## 2022-10-15 RX ADMIN — PANTOPRAZOLE SODIUM 40 MILLIGRAM(S): 20 TABLET, DELAYED RELEASE ORAL at 07:15

## 2022-10-15 RX ADMIN — ATORVASTATIN CALCIUM 40 MILLIGRAM(S): 80 TABLET, FILM COATED ORAL at 22:45

## 2022-10-15 RX ADMIN — SENNA PLUS 2 TABLET(S): 8.6 TABLET ORAL at 22:45

## 2022-10-15 RX ADMIN — SODIUM CHLORIDE 3 MILLILITER(S): 9 INJECTION INTRAMUSCULAR; INTRAVENOUS; SUBCUTANEOUS at 22:41

## 2022-10-15 RX ADMIN — SODIUM CHLORIDE 3 MILLILITER(S): 9 INJECTION INTRAMUSCULAR; INTRAVENOUS; SUBCUTANEOUS at 14:49

## 2022-10-15 RX ADMIN — MIDODRINE HYDROCHLORIDE 15 MILLIGRAM(S): 2.5 TABLET ORAL at 17:49

## 2022-10-15 NOTE — PROGRESS NOTE ADULT - PROBLEM SELECTOR PLAN 4
Appears euvolemic, not in exacerbation, saturating well on RA, no pedal edema B/L, lungs CTA  - TTE 10/8/22: LVEF 15-20% with regional WMA, grade I LV DD, mild-mod MR  - Limited TTE 10/10/22: LVEF 35% with regional WMA.   - Midodrine 20 mg q8hr titrated to 15 mg TID starting 10/15 PM  - Unable to tolerate BB 2/2 SB 40s  - Plan to start GDMT once he is able to come off midodrine.   - Advanced HF team consulted, appreciate reccs

## 2022-10-15 NOTE — PROGRESS NOTE ADULT - SUBJECTIVE AND OBJECTIVE BOX
Patient discussed on morning rounds with Dr. Parham  Transfer from CT Surgery to Cardiology      Operation / Date: no CT surgery upon this admission  s/p cerebral angio w/ thrombectomy 10/9/22    Hospital Course:  71 YO Male, current smoker, with no known PMHx originally presented to [facility] c/o 2 episodes of chest pain. Patient states that on 10/3, while at rest, he began to experience substernal chest pain w/ radiation to bilateral upper extremities, lasted about 15 minutes and resolved with Aspirin. On 10/6 patient experienced another episode of similar chest pain that again resolved with Aspirin, prompting patient to go to the ED. While in [facility] ED, patient's troponin was elevated and EKG was noted to have inverted T-waves in the lateral leads. Patient given Plavix load and started on heparin gtt. On 10/7 patient underwent cardiac cath that revealed 90% mLAD, 95% pLCX, 75% mRCA. Patient was transferred to [facility] under the care of Dr. Collins for further work-up and intervention. TTE significant for EF of 15-20%. Patient was undergoing pre-operative work-up for CABG, when on 10/9/22 patient found down w/ R-sided plegia & aphasia, stroke code called and pt found to have acute ischemic MCA stroke on CT imaging, no hemorrhage. Patient underwent cerebral angio w/ thrombectomy with neurosurgery on 10/9/22. Patient recovered in CTICU. On 10/10/22 patient was able to move R side, expressive aphasia as well, pt worked w/ PT/OT/S&S. Henry given for low BP, flores to 30s, given . Hematoma noted in R groin, vascular consulted. TTE negative for thrombus. 10/11 CT head repeated and revealed evolving infarct with no hemorrhage. 10/12 Henry given, Dopamine given and weaned off, midodrine started. HF consulted, arterial line d/c'd. Motor function continued to improve. 10/13 EP consulted, midodrine increased. 10/14 Pt asx and flores to high 20s, henry weaned off. Pt transferred to [facility].     Vital Signs Last 24 Hrs  T(C): 36.4 (15 Oct 2022 14:48), Max: 37.1 (15 Oct 2022 05:00)  T(F): 97.6 (15 Oct 2022 14:48), Max: 98.8 (15 Oct 2022 05:00)  HR: 43 (15 Oct 2022 16:50) (39 - 50)  BP: 118/59 (15 Oct 2022 16:50) (95/50 - 118/59)  BP(mean): 85 (15 Oct 2022 16:50) (67 - 85)  RR: 18 (15 Oct 2022 16:50) (16 - 18)  SpO2: 100% (15 Oct 2022 16:50) (98% - 100%)    Parameters below as of 15 Oct 2022 16:50  Patient On (Oxygen Delivery Method): room air      I&O's Detail    14 Oct 2022 07:01  -  15 Oct 2022 07:00  --------------------------------------------------------  IN:    Heparin: 228 mL    Lactated Ringers Bolus: 501 mL    Oral Fluid: 520 mL  Total IN: 1249 mL    OUT:    Voided (mL): 2125 mL  Total OUT: 2125 mL    Total NET: -876 mL      15 Oct 2022 07:01  -  15 Oct 2022 17:28  --------------------------------------------------------  IN:    Heparin: 95 mL    Oral Fluid: 560 mL  Total IN: 655 mL    OUT:    Voided (mL): 200 mL  Total OUT: 200 mL    Total NET: 455 mL    CHEST TUBE: No.   AIMEE DRAIN: No.  EPICARDIAL WIRES: No.  TIE DOWNS: No.  BRENENR: No.    PHYSICAL EXAM:    General:     Neurological:    Cardiovascular:    Respiratory:    Gastrointestinal:    Extremities:    Vascular:    Incision Sites:    LABS:                        11.0   6.34  )-----------( 193      ( 15 Oct 2022 05:21 )             32.7       COUMADIN:  No.    PT/INR - ( 15 Oct 2022 05:21 )   PT: 12.9 sec;   INR: 1.08          PTT - ( 15 Oct 2022 05:21 )  PTT:53.5 sec    10-15    142  |  108  |  9   ----------------------------<  95  4.2   |  26  |  1.03    Ca    9.1      15 Oct 2022 05:21  Phos  3.2     10-15  Mg     2.0     10-15    TPro  6.3  /  Alb  3.6  /  TBili  0.6  /  DBili  x   /  AST  21  /  ALT  18  /  AlkPhos  78  10-14          MEDICATIONS  (STANDING):  atorvastatin 40 milliGRAM(s) Oral at bedtime  clopidogrel Tablet 75 milliGRAM(s) Oral daily  heparin  Infusion 850 Unit(s)/Hr (9.5 mL/Hr) IV Continuous <Continuous>  influenza  Vaccine (HIGH DOSE) 0.7 milliLiter(s) IntraMuscular once  midodrine 15 milliGRAM(s) Oral three times a day  pantoprazole    Tablet 40 milliGRAM(s) Oral before breakfast  senna 2 Tablet(s) Oral at bedtime  sodium chloride 0.9% lock flush 3 milliLiter(s) IV Push every 8 hours    MEDICATIONS  (PRN):  acetaminophen     Tablet .. 650 milliGRAM(s) Oral every 6 hours PRN Temp greater or equal to 38C (100.4F), Mild Pain (1 - 3)        RADIOLOGY & ADDITIONAL TESTS:       Patient discussed on morning rounds with Dr. Parham  Transfer from CT Surgery to Cardiology      Operation / Date: no CT surgery upon this admission  s/p cerebral angio w/ thrombectomy 10/9/22    Hospital Course:  73 YO Male, current smoker, with no known PMHx originally presented to [facility] c/o 2 episodes of chest pain. Patient states that on 10/3, while at rest, he began to experience substernal chest pain w/ radiation to bilateral upper extremities, lasted about 15 minutes and resolved with Aspirin. On 10/6 patient experienced another episode of similar chest pain that again resolved with Aspirin, prompting patient to go to the ED. While in [facility] ED, patient's troponin was elevated and EKG was noted to have inverted T-waves in the lateral leads. Patient given Plavix load and started on heparin gtt. On 10/7 patient underwent cardiac cath that revealed 90% mLAD, 95% pLCX, 75% mRCA. Patient was transferred to [facility] under the care of Dr. Collins for further work-up and intervention. TTE significant for EF of 15-20%. Patient was undergoing pre-operative work-up for CABG, when on 10/9/22 patient found down w/ R-sided plegia & aphasia, stroke code called and pt found to have acute ischemic MCA stroke on CT imaging, no hemorrhage. Patient underwent cerebral angio w/ thrombectomy with neurosurgery on 10/9/22. Patient recovered in CTICU. On 10/10/22 patient was able to move R side, expressive aphasia as well, pt worked w/ PT/OT/S&S. Henry given for low BP, flores to 30s, given . Hematoma noted in R groin, vascular consulted. TTE negative for thrombus. 10/11 CT head repeated and revealed evolving infarct with no hemorrhage. 10/12 Henry given, Dopamine given and weaned off, midodrine started. HF consulted, arterial line d/c'd. Motor function continued to improve. 10/13 EP consulted, midodrine increased. 10/14 Pt asx and flores to high 20s, henry weaned off. Pt transferred to [facility]. 10/15 patient in stable condition. NO CABG this admission, planned for transfer to cardiology service.     Vital Signs Last 24 Hrs  T(C): 36.4 (15 Oct 2022 14:48), Max: 37.1 (15 Oct 2022 05:00)  T(F): 97.6 (15 Oct 2022 14:48), Max: 98.8 (15 Oct 2022 05:00)  HR: 43 (15 Oct 2022 16:50) (39 - 50)  BP: 118/59 (15 Oct 2022 16:50) (95/50 - 118/59)  BP(mean): 85 (15 Oct 2022 16:50) (67 - 85)  RR: 18 (15 Oct 2022 16:50) (16 - 18)  SpO2: 100% (15 Oct 2022 16:50) (98% - 100%)    Parameters below as of 15 Oct 2022 16:50  Patient On (Oxygen Delivery Method): room air      I&O's Detail    14 Oct 2022 07:01  -  15 Oct 2022 07:00  --------------------------------------------------------  IN:    Heparin: 228 mL    Lactated Ringers Bolus: 501 mL    Oral Fluid: 520 mL  Total IN: 1249 mL    OUT:    Voided (mL): 2125 mL  Total OUT: 2125 mL    Total NET: -876 mL      15 Oct 2022 07:01  -  15 Oct 2022 17:28  --------------------------------------------------------  IN:    Heparin: 95 mL    Oral Fluid: 560 mL  Total IN: 655 mL    OUT:    Voided (mL): 200 mL  Total OUT: 200 mL    Total NET: 455 mL    Appearance: No Acute Distress  HEENT: JVP 10 cm H2O, Kussmauls sign +ve  Cardiovascular: RRR, Normal S1 S2, No murmurs/rubs/gallops  Respiratory: Clear to auscultation bilaterally  Gastrointestinal: Soft, Non-tender, non-distended	  Extremities: No LE edema, warm and well perfused  Psychiatry: A & O x 3,     LABS:                        11.0   6.34  )-----------( 193      ( 15 Oct 2022 05:21 )             32.7       COUMADIN:  No.    PT/INR - ( 15 Oct 2022 05:21 )   PT: 12.9 sec;   INR: 1.08          PTT - ( 15 Oct 2022 05:21 )  PTT:53.5 sec    10-15    142  |  108  |  9   ----------------------------<  95  4.2   |  26  |  1.03    Ca    9.1      15 Oct 2022 05:21  Phos  3.2     10-15  Mg     2.0     10-15    TPro  6.3  /  Alb  3.6  /  TBili  0.6  /  DBili  x   /  AST  21  /  ALT  18  /  AlkPhos  78  10-14      MEDICATIONS  (STANDING):  atorvastatin 40 milliGRAM(s) Oral at bedtime  clopidogrel Tablet 75 milliGRAM(s) Oral daily  heparin  Infusion 850 Unit(s)/Hr (9.5 mL/Hr) IV Continuous <Continuous>  influenza  Vaccine (HIGH DOSE) 0.7 milliLiter(s) IntraMuscular once  midodrine 15 milliGRAM(s) Oral three times a day  pantoprazole    Tablet 40 milliGRAM(s) Oral before breakfast  senna 2 Tablet(s) Oral at bedtime  sodium chloride 0.9% lock flush 3 milliLiter(s) IV Push every 8 hours    MEDICATIONS  (PRN):  acetaminophen     Tablet .. 650 milliGRAM(s) Oral every 6 hours PRN Temp greater or equal to 38C (100.4F), Mild Pain (1 - 3)    RADIOLOGY & ADDITIONAL TESTS:  < from: Xray Chest 1 View- PORTABLE-Routine (Xray Chest 1 View- PORTABLE-Routine in AM.) (10.14.22 @ 05:21) >  HISTORY: Follow-up preop cardiac surgery    IMPRESSION:    Lungs clear. No infiltrate pleural effusion or pneumothorax. Unremarkable   cardiac silhouette. No acute bone abnormality.    < end of copied text >

## 2022-10-15 NOTE — PROGRESS NOTE ADULT - SUBJECTIVE AND OBJECTIVE BOX
Doing well. Stepped down from ICU. No chest pain at present.   BPs are ok with midodrine  reports having baseline low BPs for many years.    Medications:  acetaminophen     Tablet .. 650 milliGRAM(s) Oral every 6 hours PRN  atorvastatin 40 milliGRAM(s) Oral at bedtime  clopidogrel Tablet 75 milliGRAM(s) Oral daily  heparin  Infusion 850 Unit(s)/Hr IV Continuous <Continuous>  influenza  Vaccine (HIGH DOSE) 0.7 milliLiter(s) IntraMuscular once  midodrine 20 milliGRAM(s) Oral every 8 hours  pantoprazole    Tablet 40 milliGRAM(s) Oral before breakfast  senna 2 Tablet(s) Oral at bedtime  sodium chloride 0.9% lock flush 3 milliLiter(s) IV Push every 8 hours    Vitals:  T(C): 36.4 (10-15-22 @ 14:48), Max: 37.2 (10-14-22 @ 17:18)  HR: 43 (10-15-22 @ 12:08) (39 - 50)  BP: 111/59 (10-15-22 @ 12:08) (95/50 - 115/57)  BP(mean): 82 (10-15-22 @ 12:08) (67 - 82)  ABP: --  ABP(mean): --  RR: 16 (10-15-22 @ 12:08) (16 - 18)  SpO2: 99% (10-15-22 @ 12:08) (97% - 100%)  Wt(kg): --  CVP(cm H2O): --  CO: --  CI: --  PA: --  PA(mean): --  PCWP: --  SVR: --  PVR: --    Daily     Daily         I&O's Summary    14 Oct 2022 07:01  -  15 Oct 2022 07:00  --------------------------------------------------------  IN: 1249 mL / OUT: 2125 mL / NET: -876 mL    15 Oct 2022 07:01  -  15 Oct 2022 15:10  --------------------------------------------------------  IN: 607.5 mL / OUT: 200 mL / NET: 407.5 mL        Physical Exam:  Appearance: No Acute Distress  HEENT: JVP 10 cm H2O, Kussmauls sign +ve  Cardiovascular: RRR, Normal S1 S2, No murmurs/rubs/gallops  Respiratory: Clear to auscultation bilaterally  Gastrointestinal: Soft, Non-tender, non-distended	  Extremities: No LE edema, warm and well perfused  Psychiatry: A & O x 3,       Labs:                        11.0   6.34  )-----------( 193      ( 15 Oct 2022 05:21 )             32.7     10-15    142  |  108  |  9   ----------------------------<  95  4.2   |  26  |  1.03    Ca    9.1      15 Oct 2022 05:21  Phos  3.2     10-15  Mg     2.0     10-15    TPro  6.3  /  Alb  3.6  /  TBili  0.6  /  DBili  x   /  AST  21  /  ALT  18  /  AlkPhos  78  10-14      PT/INR - ( 15 Oct 2022 05:21 )   PT: 12.9 sec;   INR: 1.08          PTT - ( 15 Oct 2022 05:21 )  PTT:53.5 sec                  TELEMETRY:    [ ] Echocardiogram:

## 2022-10-15 NOTE — PROGRESS NOTE ADULT - PROBLEM SELECTOR PLAN 2
Found to have acute ischemic MCA stroke s/p cerebral angio w/ thrombectomy with neurosurgery on 10/9/22   - Initially with expressive aphasia and RUE/RLE weakness; however, currently with no focal neuro deficits    Hospital course c/b hypotension (started on Midodrine) & bradycardia 20s-40s (improved to 40s) for which EP was consulted with plan for continued monitoring and assessment when able to be weaned off midodrine & post PCI as HR improves slightly with being off phenylephrine.  Deemed no longer a surgical candidate with plan for repeat CT head Tuesday for neuro clearance followed by PCI of LAD on Wednesday with Dr. Gandhi. R groin with large ecchymosis, quarter-sized non-tender hard, non-compressible hematoma with bruit appreciated at site of previous cath access, R DP 1+, R PT 2+  - f/u RLE arterial duplex R groin with large ecchymosis, quarter-sized non-tender hard, non-compressible hematoma with bruit appreciated at site of previous cath access, R DP 1+, R PT 2+  - RLE arterial duplex 10/10: thin layer of hypoechoic free fluid is noted superficial to the R CFA c/w confluent edema; no distinct fluid collection is identified; no hematoma or pseudoaneurysm identified.   - Continue to monitor

## 2022-10-15 NOTE — PROGRESS NOTE ADULT - PROBLEM SELECTOR PLAN 1
Currently CP free and hemodynamically stable  - Trop T peaked 0.34  - s/p diagnostic cath @ [facility] 10/7/22: mLAD 90%, pLCx 95%, mRCA 75%, RFA access  - Referred for CABG; however, deemed not a surgical candidate 2/2 MCA stroke  - c/w heparin gtt for NSTEMI  - f/u CT head on Tuesday Currently CP free and hemodynamically stable  - Trop T peaked 0.34  - s/p diagnostic cath @ [facility] 10/7/22: mLAD 90%, pLCx 95%, mRCA 75%, RFA access  - Limited TTE 10/10/22: LVEF 35% with regional WMA.   - Referred for CABG; however, deemed not a surgical candidate 2/2 MCA stroke  - MR cardiac viability performed, f/u results  - f/u CT head on Tuesday, f/u neuro for neuro clearance.  If neuro clearance obtained, then plan for staged PCI with Dr. Gandhi on Wednesday 10/19 pending MR viability results (consented, [facility] office).  - f/u neuro re: aspirin initiation   - c/w heparin gtt for NSTEMI  - c/w Plavix 75 mg PO QD, Atorvastatin 40 mg PO QD

## 2022-10-15 NOTE — PROGRESS NOTE ADULT - ASSESSMENT
72        Henry given for low BP, flores to 30s, given . Hematoma noted in R groin, vascular consulted. TTE negative for thrombus. 10/11 CT head repeated and revealed evolving infarct with no hemorrhage. 10/12 Henry given, Dopamine given and weaned off, midodrine started. HF consulted, arterial line d/c'd. Motor function continued to improve. 10/13 EP consulted, midodrine increased. 10/14 Pt asx and flores to high 20s, henry weaned off. Pt transferred to [facility].     Plan:    Neurovascular:   -Acute ischemic MCA stroke s/p cerebral angio w/ thrombectomy on 10/9/22  -Will need neuro clearance prior to PCI next week  -Neuro recs appreciated  -Cont plavix and statin  -Pain well controlled with current regimen. PRN's: Tylenol 650mg PO Q6H PRN mild pain    Cardiovascular:   -3v CAD   -Patient no longer a surgical candidate for CABG  -F/u MR viability  -Transfer to cardiac service for possible PCI next week  -EP consulted for possible PPM placement next week.  -Cont Plavix, Lipitor and Hep gtt  -Hypotension and Bradycardia (to high 20s today)  -Cont Midodrine, wean as tolerated.   -EP and HF following. Possible PPM next week.  -Hemodynamically stable.   -Monitor: BP, HR, tele    Respiratory:   -Current, 1/2 ppd daily smoker x30-40 years  -Oxygenating well on room air  -Encourage continued use of IS 10x/hr and frequent ambulation  -CXR: stable    GI:  -GI PPX: Protonix  -PO Diet  -Bowel Regimen: Senna     Renal / :  -Continue to monitor renal function: BUN/Cr: .12  -Monitor I/O's daily     Endocrine:    -No hx of DM or thyroid dx  -A1c: 5.4  -TSH: 2.29    Hematologic:  -CBC: H/H- 11.6/35.4  -Coagulation Panel.    ID:  -Temperature: Afebrile   -CBC: WBC- 7.20  -Continue to observe for SIRS/Sepsis Syndrome.    Prophylaxis:  -Cont heparin gtt  -Continue with SCD's b/l while patient is at rest     Disposition:  -Plan for possible PCI next week         Neurovascular:   -Acute ischemic MCA stroke s/p cerebral angio w/ thrombectomy on 10/9/22  -Will need neuro clearance prior to PCI next week  -Neuro recs appreciated  -Cont plavix and statin  -Pain well controlled with current regimen. PRN's: Tylenol 650mg PO Q6H PRN mild pain    Cardiovascular:   -3v CAD   -Patient no longer a surgical candidate for CABG  -F/u MR viability  -Transfer to cardiac service for possible PCI next week  -EP consulted for possible PPM placement next week.  -Cont Plavix, Lipitor and Hep gtt  -Hypotension and Bradycardia (to high 20s today)  -Cont Midodrine, wean as tolerated.   -EP and HF following. Possible PPM next week.  -Hemodynamically stable.   -Monitor: BP, HR, tele    Respiratory:   -Current, 1/2 ppd daily smoker x30-40 years  -Oxygenating well on room air  -Encourage continued use of IS 10x/hr and frequent ambulation  -CXR: stable    GI:  -GI PPX: Protonix  -PO Diet  -Bowel Regimen: Senna     Renal / :  -Continue to monitor renal function: BUN/Cr: 12/1.12  -Monitor I/O's daily     Endocrine:    -No hx of DM or thyroid dx  -A1c: 5.4  -TSH: 2.29    Hematologic:  -CBC: H/H- 11.6/35.4  -Coagulation Panel.    ID:  -Temperature: Afebrile   -CBC: WBC- 7.20  -Continue to observe for SIRS/Sepsis Syndrome.    Prophylaxis:  -Cont heparin gtt  -Continue with SCD's b/l while patient is at rest     Disposition:  -Plan for possible PCI next week       -Cont Midodrine, wean as tolerated.   -EP and HF following. Possible PPM next week.  -Hemodynamically stable.   -Monitor: BP, HR, tele    Respiratory:   -Current, 1/2 ppd daily smoker x30-40 years  -Oxygenating well on room air  -Encourage continued use of IS 10x/hr and frequent ambulation  -CXR: stable     72-year-old M, current 1/2 PPD smoker x 30-40 years,  poor medical follow up, no known PMHx who presented to [facility] c/o recurrent episodes SSCP radiating to B/L arms on waking that resolved with ASA, EKG revealed TWI lateral leads, trop T peaked 0.34, started on heparin gtt for NSTEMI, underwent cardiac cath on 10/7 revealing mLAD 90%, pLCx 95%, mRCA 75%. TTE revealed newly reduced EF 15-20%. Transferred to [facility] under Dr. Collins for CABG; however found to have acute ischemic MCA stroke, s/p cerebral angio w/ thrombectomy with neurosurgery on 10/9/22 (continues with expressive aphasia but strength on R side improving). Hospital course c/b hypotension (started on Midodrine) & bradycardia 20s-40s (improved to 40s) for which EP was consulted with plan for continued monitoring and assessment when able to be weaned off midodrine & post PCI as HR improves slightly with being off phenylephrine.  Deemed no longer a surgical candidate with plan for repeat CT head Tuesday for neuro clearance followed by PCI of LAD on Wednesday with Dr. Gandhi.

## 2022-10-15 NOTE — PROGRESS NOTE ADULT - NUTRITIONAL ASSESSMENT
72-year-old M, current 1/2 PPD smoker x 30-40 years,  poor medical follow up, no known PMHx who presented to [facility] c/o recurrent episodes SSCP radiating to B/L arms on waking that resolved with ASA, EKG revealed TWI lateral leads, trop T peaked 0.34, started on heparin gtt for NSTEMI, underwent cardiac cath on 10/7 revealing mLAD 90%, pLCx 95%, mRCA 75%. TTE revealed newly reduced EF 15-20%. Transferred to [facility] under Dr. Collins for CABG; however found to have acute ischemic MCA stroke, s/p cerebral angio w/ thrombectomy with neurosurgery on 10/9/22 (continues with expressive aphasia but strength on R side improving). Hospital course c/b hypotension (started on Midodrine) & bradycardia 20s-40s (improved to 40s) for which EP was consulted with plan for continued monitoring and assessment when able to be weaned off midodrine & post PCI as HR improves slightly with being off phenylephrine.  Deemed no longer a surgical candidate with plan for repeat CT head Tuesday for neuro clearance followed by PCI of LAD on Wednesday with Dr. Gandhi.

## 2022-10-15 NOTE — PROGRESS NOTE ADULT - ASSESSMENT
71 YO Male, current smoker, with no known PMHx originally presented to [facility] c/o 2 episodes of chest pain. Patient states that on 10/3, while at rest, he began to experience substernal chest pain w/ radiation to bilateral upper extremities, lasted about 15 minutes and resolved with Aspirin. On 10/6 patient experienced another episode of similar chest pain that again resolved with Aspirin, prompting patient to go to the ED. While in [facility] ED, patient's troponin was elevated and EKG was noted to have inverted T-waves in the lateral leads. Patient given Plavix load and started on heparin gtt. On 10/7 patient underwent cardiac cath that revealed 90% mLAD, 95% pLCX, 75% mRCA. Patient was transferred to [facility] under the care of Dr. Collins for further work-up and intervention. TTE significant for EF of 15-20%. Patient was undergoing pre-operative work-up for CABG, when on 10/9/22 patient found down w/ R-sided plegia & aphasia, stroke code called and pt found to have acute ischemic MCA stroke on CT imaging, no hemorrhage. Patient underwent cerebral angio w/ thrombectomy with neurosurgery on 10/9/22. Patient recovered in CTICU. On 10/10/22 patient was able to move R side, expressive aphasia as well, pt worked w/ PT/OT/S&S. Henry given for low BP, flores to 30s, given . Hematoma noted in R groin, vascular consulted. TTE negative for thrombus. 10/11 CT head repeated and revealed evolving infarct with no hemorrhage. 10/12 Henry given, Dopamine given and weaned off, midodrine started. HF consulted, arterial line d/c'd. Motor function continued to improve. 10/13 EP consulted, midodrine increased. 10/14 Pt asx and flores to high 20s, henry weaned off. Pt transferred to [facility]. 10/15 patient in stable condition. NO CABG this admission, planned for transfer to cardiology service.     Plan:  TRansfer to cardiology service  Complex PCI next week   F/u MRI results  CT head next Tuesday   c/w hep gtt  on plavix

## 2022-10-15 NOTE — PROGRESS NOTE ADULT - PROBLEM SELECTOR PLAN 6
15-20 pack years  - Encourage smoking cessation    - VTE ppx: heparin  - Dispo: pending clinical course

## 2022-10-15 NOTE — PROGRESS NOTE ADULT - SUBJECTIVE AND OBJECTIVE BOX
Interventional Cardiology PA [facility] Stepover Progress Note    INCOMPLETE    Hospital course    Subjective Assessment:  	  MEDICATIONS:  midodrine 15 milliGRAM(s) Oral three times a day  acetaminophen     Tablet .. 650 milliGRAM(s) Oral every 6 hours PRN  pantoprazole    Tablet 40 milliGRAM(s) Oral before breakfast  senna 2 Tablet(s) Oral at bedtime  atorvastatin 40 milliGRAM(s) Oral at bedtime  clopidogrel Tablet 75 milliGRAM(s) Oral daily  heparin  Infusion 850 Unit(s)/Hr IV Continuous <Continuous>  influenza  Vaccine (HIGH DOSE) 0.7 milliLiter(s) IntraMuscular once  sodium chloride 0.9% lock flush 3 milliLiter(s) IV Push every 8 hours    [PHYSICAL EXAM:  TELEMETRY:  T(C): 36.4 (10-15-22 @ 14:48), Max: 37.2 (10-14-22 @ 17:18)  HR: 43 (10-15-22 @ 12:08) (39 - 50)  BP: 111/59 (10-15-22 @ 12:08) (95/50 - 114/55)  RR: 16 (10-15-22 @ 12:08) (16 - 18)  SpO2: 99% (10-15-22 @ 12:08) (98% - 100%)  Wt(kg): --  I&O's Summary    14 Oct 2022 07:01  -  15 Oct 2022 07:00  --------------------------------------------------------  IN: 1249 mL / OUT: 2125 mL / NET: -876 mL    15 Oct 2022 07:01  -  15 Oct 2022 16:52  --------------------------------------------------------  IN: 607.5 mL / OUT: 200 mL / NET: 407.5 mL                Appearance: Normal	  HEENT:   Normal oral mucosa, PERRL, EOMI	  Neck: Supple, + JVD/ - JVD; Carotid Bruit   Cardiovascular: Normal S1 S2, No JVD, No murmurs,   Respiratory: Lungs clear to auscultation/Decreased Breath Sounds/No Rales, Rhonchi, Wheezing	  Gastrointestinal:  Soft, Non-tender, + BS	  Skin: No rashes, No ecchymoses, No cyanosis  Extremities: Normal range of motion, No clubbing, cyanosis or edema  Vascular: Peripheral pulses palpable 2+ bilaterally  Neurologic: Non-focal  Psychiatry: A & O x 3, Mood & affect appropriate                        11.0   6.34  )-----------( 193      ( 15 Oct 2022 05:21 )             32.7     10-15    142  |  108  |  9   ----------------------------<  95  4.2   |  26  |  1.03    Ca    9.1      15 Oct 2022 05:21  Phos  3.2     10-15  Mg     2.0     10-15    TPro  6.3  /  Alb  3.6  /  TBili  0.6  /  DBili  x   /  AST  21  /  ALT  18  /  AlkPhos  78  10-14  PT/INR - ( 15 Oct 2022 05:21 )   PT: 12.9 sec;   INR: 1.08     PTT - ( 15 Oct 2022 05:21 )  PTT:53.5 sec   Interventional Cardiology PA 9 [facility] Progress Note        Hospital course:  71 YO Male, current smoker, with no known PMHx originally presented to [facility] c/o 2 episodes of chest pain. Patient states that on 10/3, while at rest, he began to experience substernal chest pain w/ radiation to bilateral upper extremities, lasted about 15 minutes and resolved with Aspirin. On 10/6 patient experienced another episode of similar chest pain that again resolved with Aspirin, prompting patient to go to the ED. While in [facility] ED, patient's troponin was elevated and EKG was noted to have inverted T-waves in the lateral leads. Patient given Plavix load and started on heparin gtt. On 10/7 patient underwent cardiac cath that revealed 90% mLAD, 95% pLCX, 75% mRCA. Patient was transferred to [facility] under the care of Dr. Collins for further work-up and intervention. TTE significant for EF of 15-20%. Patient was undergoing pre-operative work-up for CABG, when on 10/9/22 patient found down w/ R-sided plegia & aphasia, stroke code called and pt found to have acute ischemic MCA stroke on CT imaging, no hemorrhage. Patient underwent cerebral angio w/ thrombectomy with neurosurgery on 10/9/22. Patient recovered in CTICU. On 10/10/22 patient was able to move R side, expressive aphasia as well, pt worked w/ PT/OT/S&S. Henry given for low BP, flores to 30s, given . Hematoma noted in R groin, vascular consulted. TTE negative for thrombus. 10/11 CT head repeated and revealed evolving infarct with no hemorrhage. 10/12 Henry given, Dopamine given and weaned off, midodrine started. HF consulted, arterial line d/c'd. Motor function continued to improve. 10/13 EP consulted, midodrine increased. 10/14 Pt asx and flores to high 20s, henry weaned off. Pt transferred to [facility].     Subjective Assessment:  Patient seen and evaluated at bedside this evening on 9L. States HR commonly in 40s at home. Denies CP, SOB, dizziness, palpitations, N/V. All other ROS otherwise negative except per subjective.   	  MEDICATIONS:  midodrine 15 milliGRAM(s) Oral three times a day  acetaminophen     Tablet .. 650 milliGRAM(s) Oral every 6 hours PRN  pantoprazole    Tablet 40 milliGRAM(s) Oral before breakfast  senna 2 Tablet(s) Oral at bedtime  atorvastatin 40 milliGRAM(s) Oral at bedtime  clopidogrel Tablet 75 milliGRAM(s) Oral daily  heparin  Infusion 850 Unit(s)/Hr IV Continuous <Continuous>  influenza  Vaccine (HIGH DOSE) 0.7 milliLiter(s) IntraMuscular once  sodium chloride 0.9% lock flush 3 milliLiter(s) IV Push every 8 hours    [PHYSICAL EXAM:  TELEMETRY:  T(C): 36.4 (10-15-22 @ 14:48), Max: 37.2 (10-14-22 @ 17:18)  HR: 43 (10-15-22 @ 12:08) (39 - 50)  BP: 111/59 (10-15-22 @ 12:08) (95/50 - 114/55)  RR: 16 (10-15-22 @ 12:08) (16 - 18)  SpO2: 99% (10-15-22 @ 12:08) (98% - 100%)  Wt(kg): --  I&O's Summary    14 Oct 2022 07:  -  15 Oct 2022 07:00  --------------------------------------------------------  IN: 1249 mL / OUT: 2125 mL / NET: -876 mL    15 Oct 2022 07:  -  15 Oct 2022 16:52  --------------------------------------------------------  IN: 607.5 mL / OUT: 200 mL / NET: 407.5 mL                Appearance: WD/WN laying in hospital bed in NAD	  HEENT: EOMI	  Neck: - JVD  Cardiovascular: SB, No murmurs  Respiratory: CTA B/L, no w/r/r  Gastrointestinal:  Soft, Non-tender, + BS	x4  Skin: No rashes, No ecchymoses, No cyanosis  Extremities: No pedal edema B/L  Vascular: R DP 1+, R PT 2+, L DP/PT 2+  Neurologic: Non-focal, UE/LE strength 5/5 B/L  Psychiatry: A & O x 3, Mood & affect appropriate                        11.0   6.34  )-----------( 193      ( 15 Oct 2022 05:21 )             32.7     10-15    142  |  108  |  9   ----------------------------<  95  4.2   |  26  |  1.03    Ca    9.1      15 Oct 2022 05:21  Phos  3.2     10-15  Mg     2.0     10-15    TPro  6.3  /  Alb  3.6  /  TBili  0.6  /  DBili  x   /  AST  21  /  ALT  18  /  AlkPhos  78  10-14  PT/INR - ( 15 Oct 2022 05:21 )   PT: 12.9 sec;   INR: 1.08     PTT - ( 15 Oct 2022 05:21 )  PTT:53.5 sec     Interventional Cardiology PA 9 [facility] Progress Note    Hospital course:  73 YO Male, current smoker, with no known PMHx originally presented to [facility] c/o 2 episodes of chest pain. Patient states that on 10/3, while at rest, he began to experience substernal chest pain w/ radiation to bilateral upper extremities, lasted about 15 minutes and resolved with Aspirin. On 10/6 patient experienced another episode of similar chest pain that again resolved with Aspirin, prompting patient to go to the ED. While in [facility] ED, patient's troponin was elevated and EKG was noted to have inverted T-waves in the lateral leads. Patient given Plavix load and started on heparin gtt. On 10/7 patient underwent cardiac cath that revealed 90% mLAD, 95% pLCX, 75% mRCA. Patient was transferred to [facility] under the care of Dr. Collins for further work-up and intervention. TTE significant for EF of 15-20%. Patient was undergoing pre-operative work-up for CABG, when on 10/9/22 patient found down w/ R-sided plegia & aphasia, stroke code called and pt found to have acute ischemic MCA stroke on CT imaging, no hemorrhage. Patient underwent cerebral angio w/ thrombectomy with neurosurgery on 10/9/22. Patient recovered in CTICU. On 10/10/22 patient was able to move R side, expressive aphasia as well, pt worked w/ PT/OT/S&S. Henry given for low BP, flores to 30s, given . Hematoma noted in R groin, vascular consulted. TTE negative for thrombus. 10/11 CT head repeated and revealed evolving infarct with no hemorrhage. 10/12 Henry given, Dopamine given and weaned off, midodrine started. HF consulted, arterial line d/c'd. Motor function continued to improve. 10/13 EP consulted, midodrine increased. 10/14 Pt asx and flores to high 20s, henry weaned off. Pt transferred to [facility].     Subjective Assessment:  Patient seen and evaluated at bedside this evening on 9L. States HR commonly in 40s at home. Denies CP, SOB, dizziness, palpitations, N/V. All other ROS otherwise negative except per subjective.   	  MEDICATIONS:  midodrine 15 milliGRAM(s) Oral three times a day  acetaminophen     Tablet .. 650 milliGRAM(s) Oral every 6 hours PRN  pantoprazole    Tablet 40 milliGRAM(s) Oral before breakfast  senna 2 Tablet(s) Oral at bedtime  atorvastatin 40 milliGRAM(s) Oral at bedtime  clopidogrel Tablet 75 milliGRAM(s) Oral daily  heparin  Infusion 850 Unit(s)/Hr IV Continuous <Continuous>  influenza  Vaccine (HIGH DOSE) 0.7 milliLiter(s) IntraMuscular once  sodium chloride 0.9% lock flush 3 milliLiter(s) IV Push every 8 hours    [PHYSICAL EXAM:  TELEMETRY:  T(C): 36.4 (10-15-22 @ 14:48), Max: 37.2 (10-14-22 @ 17:18)  HR: 43 (10-15-22 @ 12:08) (39 - 50)  BP: 111/59 (10-15-22 @ 12:08) (95/50 - 114/55)  RR: 16 (10-15-22 @ 12:08) (16 - 18)  SpO2: 99% (10-15-22 @ 12:08) (98% - 100%)  Wt(kg): --  I&O's Summary    14 Oct 2022 07:  -  15 Oct 2022 07:00  --------------------------------------------------------  IN: 1249 mL / OUT: 2125 mL / NET: -876 mL    15 Oct 2022 07:  -  15 Oct 2022 16:52  --------------------------------------------------------  IN: 607.5 mL / OUT: 200 mL / NET: 407.5 mL                Appearance: WD/WN laying in hospital bed in NAD	  HEENT: EOMI	  Neck: - JVD  Cardiovascular: SB, No murmurs  Respiratory: CTA B/L, no w/r/r  Gastrointestinal:  Soft, Non-tender, + BS	x4  Skin: No rashes, No ecchymoses, No cyanosis  Extremities: No pedal edema B/L  Vascular: R DP 1+, R PT 2+, L DP/PT 2+  Neurologic: Non-focal, UE/LE strength 5/5 B/L  Psychiatry: A & O x 3, Mood & affect appropriate                        11.0   6.34  )-----------( 193      ( 15 Oct 2022 05:21 )             32.7     10-15    142  |  108  |  9   ----------------------------<  95  4.2   |  26  |  1.03    Ca    9.1      15 Oct 2022 05:21  Phos  3.2     10-15  Mg     2.0     10-15    TPro  6.3  /  Alb  3.6  /  TBili  0.6  /  DBili  x   /  AST  21  /  ALT  18  /  AlkPhos  78  10-14  PT/INR - ( 15 Oct 2022 05:21 )   PT: 12.9 sec;   INR: 1.08     PTT - ( 15 Oct 2022 05:21 )  PTT:53.5 sec

## 2022-10-15 NOTE — PROGRESS NOTE ADULT - ASSESSMENT
72/M smoker w/ NSTEMI. [facility] with 3 vesseal CAD(90% mid LAD, 95% Prox CX, 85% RCA disease) and was planned for CABG. Pt suffered CVA and had a fall with laceration to scalp. Pt had thrombectomy of MCA with resolution of the weakness. Now planning PCI instead of CABG      Plan:  Cont antiplatelets and statin  on midodrine 20q8, wean to 15 tid now and then to 10 tid if tolerated given reduced EF with aim to stop it when possible.  Cardiac MRI report pending  PCI with Dr Gandhi on Wednesday tentatively  Will start GDMT once he is able to come off midodrine.     TTE with EF 15-20%, LVEDD 5.8cms, Normal RV, Mod MR, NO LV thrombus.

## 2022-10-15 NOTE — PROGRESS NOTE ADULT - PROBLEM SELECTOR PLAN 3
Found to have acute ischemic MCA stroke s/p cerebral angio w/ thrombectomy with neurosurgery on 10/9/22   - Initially with expressive aphasia and RUE/RLE weakness; however, currently with no focal neuro deficits   - c/w Plavix 75 mg PO QD, Atorvastatin 40 mg PO QD  - f/u CT head planned for Tues 10/18, f/u and appreciate neuro reccs Found to have acute ischemic MCA stroke s/p cerebral angio w/ thrombectomy with neurosurgery on 10/9/22   - Initially with expressive aphasia and RUE/RLE weakness; however, currently with no focal neuro deficits   - Limited TTE w/ contrast 10/10: No obvious LV thrombus seen.  - c/w Plavix 75 mg PO QD, Atorvastatin 40 mg PO QD  - f/u CT head planned for Tues 10/18, f/u and appreciate neuro reccs  - f/u EP reccs, possible ILR implant on d/c

## 2022-10-16 LAB
ANION GAP SERPL CALC-SCNC: 11 MMOL/L — SIGNIFICANT CHANGE UP (ref 5–17)
APTT BLD: 52.2 SEC — HIGH (ref 27.5–35.5)
BUN SERPL-MCNC: 13 MG/DL — SIGNIFICANT CHANGE UP (ref 7–23)
CALCIUM SERPL-MCNC: 9.5 MG/DL — SIGNIFICANT CHANGE UP (ref 8.4–10.5)
CHLORIDE SERPL-SCNC: 106 MMOL/L — SIGNIFICANT CHANGE UP (ref 96–108)
CO2 SERPL-SCNC: 24 MMOL/L — SIGNIFICANT CHANGE UP (ref 22–31)
CREAT SERPL-MCNC: 1.11 MG/DL — SIGNIFICANT CHANGE UP (ref 0.5–1.3)
EGFR: 71 ML/MIN/1.73M2 — SIGNIFICANT CHANGE UP
GLUCOSE SERPL-MCNC: 98 MG/DL — SIGNIFICANT CHANGE UP (ref 70–99)
HCT VFR BLD CALC: 34.5 % — LOW (ref 39–50)
HGB BLD-MCNC: 11.7 G/DL — LOW (ref 13–17)
MAGNESIUM SERPL-MCNC: 2 MG/DL — SIGNIFICANT CHANGE UP (ref 1.6–2.6)
MCHC RBC-ENTMCNC: 32.1 PG — SIGNIFICANT CHANGE UP (ref 27–34)
MCHC RBC-ENTMCNC: 33.9 GM/DL — SIGNIFICANT CHANGE UP (ref 32–36)
MCV RBC AUTO: 94.5 FL — SIGNIFICANT CHANGE UP (ref 80–100)
NRBC # BLD: 0 /100 WBCS — SIGNIFICANT CHANGE UP (ref 0–0)
PLATELET # BLD AUTO: 222 K/UL — SIGNIFICANT CHANGE UP (ref 150–400)
POTASSIUM SERPL-MCNC: 4.1 MMOL/L — SIGNIFICANT CHANGE UP (ref 3.5–5.3)
POTASSIUM SERPL-SCNC: 4.1 MMOL/L — SIGNIFICANT CHANGE UP (ref 3.5–5.3)
RBC # BLD: 3.65 M/UL — LOW (ref 4.2–5.8)
RBC # FLD: 13.4 % — SIGNIFICANT CHANGE UP (ref 10.3–14.5)
SARS-COV-2 RNA SPEC QL NAA+PROBE: SIGNIFICANT CHANGE UP
SODIUM SERPL-SCNC: 141 MMOL/L — SIGNIFICANT CHANGE UP (ref 135–145)
WBC # BLD: 6.09 K/UL — SIGNIFICANT CHANGE UP (ref 3.8–10.5)
WBC # FLD AUTO: 6.09 K/UL — SIGNIFICANT CHANGE UP (ref 3.8–10.5)

## 2022-10-16 PROCEDURE — 99233 SBSQ HOSP IP/OBS HIGH 50: CPT

## 2022-10-16 RX ADMIN — SENNA PLUS 2 TABLET(S): 8.6 TABLET ORAL at 21:25

## 2022-10-16 RX ADMIN — PANTOPRAZOLE SODIUM 40 MILLIGRAM(S): 20 TABLET, DELAYED RELEASE ORAL at 06:03

## 2022-10-16 RX ADMIN — CLOPIDOGREL BISULFATE 75 MILLIGRAM(S): 75 TABLET, FILM COATED ORAL at 12:13

## 2022-10-16 RX ADMIN — SODIUM CHLORIDE 3 MILLILITER(S): 9 INJECTION INTRAMUSCULAR; INTRAVENOUS; SUBCUTANEOUS at 21:25

## 2022-10-16 RX ADMIN — SODIUM CHLORIDE 3 MILLILITER(S): 9 INJECTION INTRAMUSCULAR; INTRAVENOUS; SUBCUTANEOUS at 13:59

## 2022-10-16 RX ADMIN — HEPARIN SODIUM 9.5 UNIT(S)/HR: 5000 INJECTION INTRAVENOUS; SUBCUTANEOUS at 14:31

## 2022-10-16 RX ADMIN — MIDODRINE HYDROCHLORIDE 15 MILLIGRAM(S): 2.5 TABLET ORAL at 21:25

## 2022-10-16 RX ADMIN — MIDODRINE HYDROCHLORIDE 15 MILLIGRAM(S): 2.5 TABLET ORAL at 06:03

## 2022-10-16 RX ADMIN — SODIUM CHLORIDE 3 MILLILITER(S): 9 INJECTION INTRAMUSCULAR; INTRAVENOUS; SUBCUTANEOUS at 05:32

## 2022-10-16 RX ADMIN — ATORVASTATIN CALCIUM 40 MILLIGRAM(S): 80 TABLET, FILM COATED ORAL at 21:24

## 2022-10-16 RX ADMIN — MIDODRINE HYDROCHLORIDE 15 MILLIGRAM(S): 2.5 TABLET ORAL at 13:54

## 2022-10-16 NOTE — PROGRESS NOTE ADULT - PROBLEM SELECTOR PLAN 5
Bradycardia 20s-40s during hospital admit, now 40s-50s. Patient states his baseline HR 40s.  - EP was consulted with plan for continued monitoring and assessment when able to be weaned off midodrine & post PCI as HR improves slightly with being off phenylephrine.  - Continue to monitor

## 2022-10-16 NOTE — PROGRESS NOTE ADULT - ASSESSMENT
72-year-old M, current 1/2 PPD smoker x 30-40 years,  poor medical follow up, no known PMHx who presented to [facility] c/o recurrent episodes SSCP radiating to B/L arms on waking that resolved with ASA, EKG revealed TWI lateral leads, trop T peaked 0.34, started on heparin gtt for NSTEMI, underwent cardiac cath on 10/7 revealing 3vCADs(mLAD 90%, pLCx 95%, mRCA 75%). TTE revealed newly reduced EF 15-20% (Repeated TTE EF 35%) Transferred to [facility] under Dr. Collins for CABG; however found to have acute ischemic MCA stroke, s/p cerebral angio w/ thrombectomy with neurosurgery on 10/9/22 (continues with expressive aphasia but strength on R side improving). Hospital course c/b hypotension (started on Midodrine) & bradycardia 20s-40s (now improved to high 40s-50s) for which EP was consulted with plan for continued monitoring and assessment when able to be weaned off midodrine & post PCI as HR improves slightly with being off phenylephrine.  Deemed no longer a surgical candidate with plan for repeat CT head Tuesday for neuro clearance followed by PCI of LAD on Wednesday with Dr. Gandhi.

## 2022-10-16 NOTE — PROGRESS NOTE ADULT - PROBLEM SELECTOR PLAN 2
R groin with large ecchymosis, quarter-sized non-tender hard, non-compressible hematoma with bruit appreciated at site of previous cath access, R DP 1+, R PT 2+  - RLE arterial duplex 10/10: thin layer of hypoechoic free fluid is noted superficial to the R CFA c/w confluent edema; no distinct fluid collection is identified; no hematoma or pseudoaneurysm identified.   - Continue to monitor

## 2022-10-16 NOTE — PROGRESS NOTE ADULT - PROBLEM SELECTOR PLAN 1
Currently CP free and hemodynamically stable  - Trop T peaked 0.34  - s/p diagnostic cath @ [facility] 10/7/22: mLAD 90%, pLCx 95%, mRCA 75%, RFA access  - Limited TTE 10/10/22: LVEF 35% with regional WMA.   - Referred for CABG; however, deemed not a surgical candidate 2/2 MCA stroke  - MR cardiac viability performed, f/u results  - Plan for CT head on Tuesday 10/18 and f/u with neuro for neuro clearance (re: aspirin initiation).  If neuro clearance obtained, then plan for staged PCI with Dr. Gandhi on Wednesday 10/19 pending MR viability results (consented, [facility] office).  - c/w heparin gtt for NSTEMI  - c/w Plavix 75 mg PO QD, Atorvastatin 40 mg PO QD

## 2022-10-16 NOTE — PROGRESS NOTE ADULT - SUBJECTIVE AND OBJECTIVE BOX
Interventional Cardiology PA Adult Progress Note      Subjective Assessment:  Pt seen and examined at bedside this AM without any complaints. Denies any CP, SOB, palpitations, fever/chills, abdominal pain, LE edema. All other ROS Negative except as per Subjective and HPI  	  MEDICATIONS:  midodrine 15 milliGRAM(s) Oral three times a day  acetaminophen     Tablet .. 650 milliGRAM(s) Oral every 6 hours PRN  pantoprazole    Tablet 40 milliGRAM(s) Oral before breakfast  senna 2 Tablet(s) Oral at bedtime  atorvastatin 40 milliGRAM(s) Oral at bedtime  clopidogrel Tablet 75 milliGRAM(s) Oral daily  heparin  Infusion 850 Unit(s)/Hr IV Continuous <Continuous>  influenza  Vaccine (HIGH DOSE) 0.7 milliLiter(s) IntraMuscular once  sodium chloride 0.9% lock flush 3 milliLiter(s) IV Push every 8 hours      	    [PHYSICAL EXAM:  TELEMETRY:  T(C): 36.4 (10-16-22 @ 14:54), Max: 36.6 (10-16-22 @ 01:01)  HR: 51 (10-16-22 @ 14:21) (41 - 52)  BP: 112/57 (10-16-22 @ 14:21) (99/53 - 124/59)  RR: 17 (10-16-22 @ 14:21) (16 - 18)  SpO2: 96% (10-16-22 @ 14:21) (96% - 100%)  Wt(kg): --  I&O's Summary    15 Oct 2022 07:01  -  16 Oct 2022 07:00  --------------------------------------------------------  IN: 1102.5 mL / OUT: 1200 mL / NET: -97.5 mL    16 Oct 2022 07:01  -  16 Oct 2022 15:24  --------------------------------------------------------  IN: 85.5 mL / OUT: 750 mL / NET: -664.5 mL        Welch:  Central/PICC/Mid Line:                                         Appearance: Normal	  HEENT:  PERRL, EOMI	  Neck:- JVD  Cardiovascular: Normal S1 S2, No JVD, No murmurs,   Respiratory: Lungs clear to auscultation, No Rales, Rhonchi, or Wheezing	  Gastrointestinal:  Soft, Non-tender, + BS x 4 quads	  Skin: large ecchymoses at the r groin surgical site with quarter-sized non-tender hard, non-compressible hematoma; No rashes, No ecchymoses, No cyanosis  Extremities: Normal range of motion, No clubbing, cyanosis or edema  Vascular: R DP/PT 1 and L DP/PT 2+   Neurologic: A & O x 3, able to name items (pen and gloves) with no difficulty, no dysarthria when repeating "mama and today is a bright and jimmie day", extremities 5/5 throughout, sensation intact  Psychiatry: Mood & affect appropriate      LABS:	 	               11.7   6.09  )-----------( 222      ( 16 Oct 2022 05:34 )             34.5     10-16    141  |  106  |  13  ----------------------------<  98  4.1   |  24  |  1.11    Ca    9.5      16 Oct 2022 05:34  Phos  3.2     10-15  Mg     2.0     10-16        PT/INR - ( 15 Oct 2022 05:21 )   PT: 12.9 sec;   INR: 1.08          PTT - ( 16 Oct 2022 05:34 )  PTT:52.2 sec

## 2022-10-16 NOTE — PROGRESS NOTE ADULT - PROBLEM SELECTOR PLAN 3
Found to have acute ischemic MCA stroke s/p cerebral angio w/ thrombectomy with neurosurgery on 10/9/22   - Initially with expressive aphasia and RUE/RLE weakness; however, currently with no focal neuro deficits   - Limited TTE w/ contrast 10/10: No obvious LV thrombus seen.  - c/w Plavix 75 mg PO QD, Atorvastatin 40 mg PO QD  - CT head planned for Tues 10/18, f/u and appreciate neuro reccs  - f/u EP reccs, possible ILR implant on d/c

## 2022-10-16 NOTE — PROGRESS NOTE ADULT - PROBLEM SELECTOR PLAN 6
15-20 pack years  - Encourage smoking cessation    F: none  E: Replete if K+ <4 and Mg <2  N: Dash diet  DVT ppx: on hep gtt  Dispo: pending clinical course    Case discussed with Dr. Adame

## 2022-10-17 LAB
ANION GAP SERPL CALC-SCNC: 11 MMOL/L — SIGNIFICANT CHANGE UP (ref 5–17)
APTT BLD: 57.3 SEC — HIGH (ref 27.5–35.5)
BUN SERPL-MCNC: 9 MG/DL — SIGNIFICANT CHANGE UP (ref 7–23)
CALCIUM SERPL-MCNC: 9.7 MG/DL — SIGNIFICANT CHANGE UP (ref 8.4–10.5)
CHLORIDE SERPL-SCNC: 104 MMOL/L — SIGNIFICANT CHANGE UP (ref 96–108)
CO2 SERPL-SCNC: 24 MMOL/L — SIGNIFICANT CHANGE UP (ref 22–31)
CREAT SERPL-MCNC: 1.17 MG/DL — SIGNIFICANT CHANGE UP (ref 0.5–1.3)
EGFR: 67 ML/MIN/1.73M2 — SIGNIFICANT CHANGE UP
GLUCOSE SERPL-MCNC: 103 MG/DL — HIGH (ref 70–99)
HCT VFR BLD CALC: 37.4 % — LOW (ref 39–50)
HGB BLD-MCNC: 12.7 G/DL — LOW (ref 13–17)
MAGNESIUM SERPL-MCNC: 2 MG/DL — SIGNIFICANT CHANGE UP (ref 1.6–2.6)
MCHC RBC-ENTMCNC: 32.3 PG — SIGNIFICANT CHANGE UP (ref 27–34)
MCHC RBC-ENTMCNC: 34 GM/DL — SIGNIFICANT CHANGE UP (ref 32–36)
MCV RBC AUTO: 95.2 FL — SIGNIFICANT CHANGE UP (ref 80–100)
NRBC # BLD: 0 /100 WBCS — SIGNIFICANT CHANGE UP (ref 0–0)
PLATELET # BLD AUTO: 229 K/UL — SIGNIFICANT CHANGE UP (ref 150–400)
POTASSIUM SERPL-MCNC: 4.2 MMOL/L — SIGNIFICANT CHANGE UP (ref 3.5–5.3)
POTASSIUM SERPL-SCNC: 4.2 MMOL/L — SIGNIFICANT CHANGE UP (ref 3.5–5.3)
RBC # BLD: 3.93 M/UL — LOW (ref 4.2–5.8)
RBC # FLD: 13.4 % — SIGNIFICANT CHANGE UP (ref 10.3–14.5)
SODIUM SERPL-SCNC: 139 MMOL/L — SIGNIFICANT CHANGE UP (ref 135–145)
WBC # BLD: 4.72 K/UL — SIGNIFICANT CHANGE UP (ref 3.8–10.5)
WBC # FLD AUTO: 4.72 K/UL — SIGNIFICANT CHANGE UP (ref 3.8–10.5)

## 2022-10-17 PROCEDURE — 99232 SBSQ HOSP IP/OBS MODERATE 35: CPT | Mod: GC

## 2022-10-17 PROCEDURE — 99233 SBSQ HOSP IP/OBS HIGH 50: CPT

## 2022-10-17 PROCEDURE — 70450 CT HEAD/BRAIN W/O DYE: CPT | Mod: 26

## 2022-10-17 PROCEDURE — 76882 US LMTD JT/FCL EVL NVASC XTR: CPT | Mod: 26,RT

## 2022-10-17 RX ORDER — MIDODRINE HYDROCHLORIDE 2.5 MG/1
10 TABLET ORAL THREE TIMES A DAY
Refills: 0 | Status: DISCONTINUED | OUTPATIENT
Start: 2022-10-17 | End: 2022-10-17

## 2022-10-17 RX ORDER — ASPIRIN/CALCIUM CARB/MAGNESIUM 324 MG
325 TABLET ORAL ONCE
Refills: 0 | Status: COMPLETED | OUTPATIENT
Start: 2022-10-18 | End: 2022-10-18

## 2022-10-17 RX ORDER — MIDODRINE HYDROCHLORIDE 2.5 MG/1
10 TABLET ORAL ONCE
Refills: 0 | Status: COMPLETED | OUTPATIENT
Start: 2022-10-17 | End: 2022-10-17

## 2022-10-17 RX ADMIN — MIDODRINE HYDROCHLORIDE 10 MILLIGRAM(S): 2.5 TABLET ORAL at 14:50

## 2022-10-17 RX ADMIN — ATORVASTATIN CALCIUM 40 MILLIGRAM(S): 80 TABLET, FILM COATED ORAL at 21:42

## 2022-10-17 RX ADMIN — SODIUM CHLORIDE 3 MILLILITER(S): 9 INJECTION INTRAMUSCULAR; INTRAVENOUS; SUBCUTANEOUS at 22:16

## 2022-10-17 RX ADMIN — SODIUM CHLORIDE 3 MILLILITER(S): 9 INJECTION INTRAMUSCULAR; INTRAVENOUS; SUBCUTANEOUS at 06:52

## 2022-10-17 RX ADMIN — HEPARIN SODIUM 9.5 UNIT(S)/HR: 5000 INJECTION INTRAVENOUS; SUBCUTANEOUS at 18:01

## 2022-10-17 RX ADMIN — MIDODRINE HYDROCHLORIDE 10 MILLIGRAM(S): 2.5 TABLET ORAL at 21:41

## 2022-10-17 RX ADMIN — MIDODRINE HYDROCHLORIDE 15 MILLIGRAM(S): 2.5 TABLET ORAL at 05:31

## 2022-10-17 RX ADMIN — PANTOPRAZOLE SODIUM 40 MILLIGRAM(S): 20 TABLET, DELAYED RELEASE ORAL at 05:31

## 2022-10-17 RX ADMIN — SODIUM CHLORIDE 3 MILLILITER(S): 9 INJECTION INTRAMUSCULAR; INTRAVENOUS; SUBCUTANEOUS at 12:56

## 2022-10-17 RX ADMIN — HEPARIN SODIUM 9.5 UNIT(S)/HR: 5000 INJECTION INTRAVENOUS; SUBCUTANEOUS at 20:27

## 2022-10-17 RX ADMIN — CLOPIDOGREL BISULFATE 75 MILLIGRAM(S): 75 TABLET, FILM COATED ORAL at 14:50

## 2022-10-17 NOTE — PROGRESS NOTE ADULT - PROBLEM SELECTOR PLAN 6
15-20 pack years  - Encourage smoking cessation    F: none  E: Replete if K+ <4 and Mg <2  N: NPO after MN  DVT ppx: on hep gtt  Dispo: pending cardiac cath 10/18 with Dr. Gandhi    Case discussed with Dr. Adame

## 2022-10-17 NOTE — PROGRESS NOTE ADULT - SUBJECTIVE AND OBJECTIVE BOX
INTERVAL EVENTS:  feels well, denies cp/sob        MEDICATIONS  (STANDING):  atorvastatin 40 milliGRAM(s) Oral at bedtime  clopidogrel Tablet 75 milliGRAM(s) Oral daily  heparin  Infusion 850 Unit(s)/Hr (9.5 mL/Hr) IV Continuous <Continuous>  midodrine 15 milliGRAM(s) Oral three times a day  pantoprazole    Tablet 40 milliGRAM(s) Oral before breakfast  senna 2 Tablet(s) Oral at bedtime  sodium chloride 0.9% lock flush 3 milliLiter(s) IV Push every 8 hours    MEDICATIONS  (PRN):  acetaminophen     Tablet .. 650 milliGRAM(s) Oral every 6 hours PRN Temp greater or equal to 38C (100.4F), Mild Pain (1 - 3)      Home Medications:      Vital Signs Last 24 Hrs  T(C): 36.6 (17 Oct 2022 10:30), Max: 36.9 (17 Oct 2022 05:00)  T(F): 97.8 (17 Oct 2022 10:30), Max: 98.4 (17 Oct 2022 05:00)  HR: 50 (17 Oct 2022 08:25) (48 - 58)  BP: 125/59 (17 Oct 2022 08:25) (106/53 - 125/59)  BP(mean): 74 (16 Oct 2022 12:12) (74 - 74)  RR: 18 (17 Oct 2022 08:25) (17 - 18)  SpO2: 98% (17 Oct 2022 08:25) (96% - 98%)    Parameters below as of 17 Oct 2022 08:25  Patient On (Oxygen Delivery Method): room air         PHYSICAL EXAM:  NAD  No JVD  Regular, bradycardic, no m/r/g  CTAB  No LE edema    LABS:                        12.7   4.72  )-----------( 229      ( 17 Oct 2022 06:13 )             37.4     10-17    139  |  104  |  9   ----------------------------<  103<H>  4.2   |  24  |  1.17    Ca    9.7      17 Oct 2022 06:13  Mg     2.0     10-17          PTT - ( 16 Oct 2022 05:34 )  PTT:52.2 sec    I&O's Summary    16 Oct 2022 07:01  -  17 Oct 2022 07:00  --------------------------------------------------------  IN: 408 mL / OUT: 1650 mL / NET: -1242 mL    17 Oct 2022 07:01  -  17 Oct 2022 10:33  --------------------------------------------------------  IN: 120 mL / OUT: 0 mL / NET: 120 mL      A/P:    72M nicotine dependence no known PMH (doesn’t follow doctors) presented on 10/7 with intermittent CP x5d, found with NSTEMI 3V CAD, course c/b syncopal episode (found on floor with R forehead lac, aphasia, R weakness) while awaiting cabg, found w/ L M1 CVA on 10/9 s/p [facility] aspiration thrombectomy c/b R groin hematoma. Briefly on aleena for BP support in ludwin-infarct period of CVA. Had been on dopamine for HR support (HR range 30s-50s), asymptomatic.    #HFrEF/ICM:  - GDMT: Now off dopamine and aleena. Would wean midodrine 15 q8h -> 10 q8h -> 5 q8h -> off, each day. Will consider sglt2 and MRA prior to dc.  - Volume: euvolemic, off diuretics  - Etiology: ischemic. Will f/u cMRI for viability to determine whether benefit of revascularization outweighs benefit (bleeding risk w/ heparin during LHC), and if so, will discuss timing with interventional cardiology.  - Device: not candidate at this point    d/w HF attending  Job Reynoso MD [id number]

## 2022-10-17 NOTE — PROGRESS NOTE ADULT - ASSESSMENT
72-year-old M, current 1/2 PPD smoker x 30-40 years,  poor medical follow up, no known PMHx who presented to [facility] c/o recurrent episodes SSCP radiating to B/L arms on waking that resolved with ASA, EKG revealed TWI lateral leads, trop T peaked 0.34, started on heparin gtt for NSTEMI, underwent cardiac cath on 10/7 revealing 3vCADs(mLAD 90%, pLCx 95%, mRCA 75%). TTE revealed newly reduced EF 15-20% (Repeated TTE EF 35%) Transferred to [facility] under Dr. Collins for CABG; however found to have acute ischemic MCA stroke, s/p cerebral angio w/ thrombectomy with neurosurgery on 10/9/22 (continues with expressive aphasia but strength on R side improving). Hospital course c/b hypotension (started on Midodrine) & bradycardia 20s-40s (now improved to high 40s-50s) for which EP was consulted with plan for continued monitoring and assessment when able to be weaned off midodrine & post PCI as HR improves slightly with being off phenylephrine  Deemed no longer a surgical candidate. Plan for cardiac cath tomorrow 10/18 with Dr. Gandhi.     Precath Checklist    ASA III				Mallampati class: III	              Sedation Plan:   [  ] None   [x] Moderate   [  ]  Deep    [  ]  General Anesthesia   Patient Is Suitable Candidate For Sedation?     [ x ] Yes   [  ] No   [  ] Not Applicable   Cath Order Entered: [ x ] Yes  DAPT LOAD Ordered: [  ] Yes  [x  ] No load 2/2 pt is on heparin gtt and Plavix 75mg po daily. H/H stable, pt is to be load with Aspirin 325mg tomorrow 10/18.   Pre-Cath fluids Ordered: [  ] Yes  [ x] Not indicated 2/2 EF 15-20% with Cr 1.17  Per Dr. Gandhi, pt can have a light breakfast 10/18    Risks & benefits of procedure and sedation and risks and benefits for the alternative therapy have been explained to the patient and/or HCP in layman’s terms including but not limited to: allergic reaction, bleeding, infection, arrhythmia, respiratory compromise, renal and vascular compromise, limb damage, MI, CVA, emergent CABG/Vascular Surgery and death. Informed consent obtained and in chart.

## 2022-10-17 NOTE — PROGRESS NOTE ADULT - SUBJECTIVE AND OBJECTIVE BOX
Interventional Cardiology PA Adult Progress Note    Subjective Assessment:  Pt seen and examined at bedside this morning without any compliant. Denies any CP, palpitations, dizziness, syncope, diaphoresis, fatigue, LE edema, SOB, RUIZ, orthopnea, PND, N/V/D, abd pain, cough, congestion, fever, chills or recent sick contact. All other ROS Negative except as per Subjective and HPI  	  MEDICATIONS:  midodrine 10 milliGRAM(s) Oral three times a day  acetaminophen     Tablet .. 650 milliGRAM(s) Oral every 6 hours PRN  pantoprazole    Tablet 40 milliGRAM(s) Oral before breakfast  senna 2 Tablet(s) Oral at bedtime  atorvastatin 40 milliGRAM(s) Oral at bedtime  clopidogrel Tablet 75 milliGRAM(s) Oral daily  heparin  Infusion 850 Unit(s)/Hr IV Continuous <Continuous>  sodium chloride 0.9% lock flush 3 milliLiter(s) IV Push every 8 hours      	    [PHYSICAL EXAM:  TELEMETRY:  T(C): 36.3 (10-17-22 @ 13:39), Max: 36.9 (10-17-22 @ 05:00)  HR: 55 (10-17-22 @ 17:51) (49 - 55)  BP: 146/69 (10-17-22 @ 17:51) (106/53 - 146/69)  RR: 18 (10-17-22 @ 16:47) (18 - 18)  SpO2: 97% (10-17-22 @ 16:47) (96% - 98%)  Wt(kg): --  I&O's Summary    16 Oct 2022 07:01  -  17 Oct 2022 07:00  --------------------------------------------------------  IN: 408 mL / OUT: 1650 mL / NET: -1242 mL    17 Oct 2022 07:01  -  17 Oct 2022 19:09  --------------------------------------------------------  IN: 300 mL / OUT: 0 mL / NET: 300 mL                                            Appearance: Normal	  HEENT:  PERRL, EOMI	  Neck:- JVD  Cardiovascular: Normal S1 S2, No JVD, No murmurs,   Respiratory: Lungs clear to auscultation, No Rales, Rhonchi, or Wheezing	  Gastrointestinal:  Soft, Non-tender, + BS x 4 quads	  Skin: large ecchymoses at the r groin surgical site with quarter-sized non-tender hard, non-compressible hematoma; No rashes, No ecchymoses, No cyanosis  Extremities: Normal range of motion, No clubbing, cyanosis or edema  Vascular: R DP/PT 1 and L DP/PT 2+   Neurologic: A & O x 3,   Psychiatry: Mood & affect appropriate    LABS:	 	               12.7   4.72  )-----------( 229      ( 17 Oct 2022 06:13 )             37.4     10-17    139  |  104  |  9   ----------------------------<  103<H>  4.2   |  24  |  1.17    Ca    9.7      17 Oct 2022 06:13  Mg     2.0     10-17        PTT - ( 17 Oct 2022 12:02 )  PTT:57.3 sec

## 2022-10-17 NOTE — CHART NOTE - NSCHARTNOTEFT_GEN_A_CORE
Admitting Diagnosis:   Patient is a 72y old  Male who presents with a chief complaint of Coronary Artery Disease (17 Oct 2022 10:33)      PAST MEDICAL & SURGICAL HISTORY:  H/O inguinal hernia repair          Current Nutrition Order:soft bite sized /DASH       PO Intake: Good (%) [ x  ]  Fair (50-75%) [   ] Poor (<25%) [   ]    GI Issues: BM this am    Pain:denied    Skin Integrity:right shayy laceration and left and right knee abrasion    Labs:   10-17    139  |  104  |  9   ----------------------------<  103<H>  4.2   |  24  |  1.17    Ca    9.7      17 Oct 2022 06:13  Mg     2.0     10-17      CAPILLARY BLOOD GLUCOSE          Medications:  MEDICATIONS  (STANDING):  atorvastatin 40 milliGRAM(s) Oral at bedtime  clopidogrel Tablet 75 milliGRAM(s) Oral daily  heparin  Infusion 850 Unit(s)/Hr (9.5 mL/Hr) IV Continuous <Continuous>  midodrine 10 milliGRAM(s) Oral three times a day  pantoprazole    Tablet 40 milliGRAM(s) Oral before breakfast  senna 2 Tablet(s) Oral at bedtime  sodium chloride 0.9% lock flush 3 milliLiter(s) IV Push every 8 hours    MEDICATIONS  (PRN):  acetaminophen     Tablet .. 650 milliGRAM(s) Oral every 6 hours PRN Temp greater or equal to 38C (100.4F), Mild Pain (1 - 3)      Weight:71.2kg  Daily     Daily no updated weights IBW:75.4kg,UBW:79.5kg    Weight Change: no updated weights    Estimated energy needs: Based on ABW due to between % of IBW.ABW:71.2kg [id number]:1780-2136kcal and 1.2-1.4gmprotein:85-100gmprotein and fluids per team      Subjective: 72M nicotine dependence no known PMH (doesn’t follow doctors) presented on 10/7 with intermittent CP x5d, found with NSTEMI 3V CAD, course c/b syncopal episode (found on floor with R forehead lac, aphasia, R weakness) while awaiting CABG found w/ L M1 CVA on 10/9 s/p [facility] aspiration thrombectomy c/b R groin hematoma. Briefly on aleena for BP support in ludwin-infarct period of CVA. Had been on dopamine for HR support (HR range 30s-50s), asymptomatic.  This afternoon, patient in bed watching movie with no complaints. Finished 100% of lunch tray. No N/V/D/C or pain reported. Patient stated he is fine being on soft diet and that he had a BM today.     Previous Nutrition Diagnosis :Increased nutrient needs (protein and kcal) r/t physiological demands of nutrients AEB: post-op demands    Active [ x  ]  Resolved [   ]    If resolved, new PES:     Goal :Meet >75% of EER consistently    Recommendations:1.Updated weights    Education: completed.      Risk Level: High [   ] Moderate [  x ] Low [   ]

## 2022-10-17 NOTE — PROGRESS NOTE ADULT - PROBLEM SELECTOR PLAN 5
Bradycardia 20s-40s during hospital admit, now 50s. Patient states his baseline HR 40s.  - EP was consulted with plan for continued monitoring and assessment when able to be weaned off midodrine & post PCI as HR improves slightly with being off phenylephrine.  - Continue to monitor

## 2022-10-17 NOTE — PROGRESS NOTE ADULT - PROBLEM SELECTOR PLAN 2
R groin with large ecchymosis, quarter-sized non-tender hard, non-compressible hematoma with bruit appreciated at site of previous cath access, R DP 1+, R PT 2+  - RLE arterial duplex 10/10: thin layer of hypoechoic free fluid is noted superficial to the R CFA c/w confluent edema; no distinct fluid collection is identified; no hematoma or pseudoaneurysm identified.   -Repeat RLE duplex 10/17: No right groin mass or fluid collection. A few benign-appearing right inguinal lymph nodes visualized in the palpable area of concern.  - Continue to monitor

## 2022-10-17 NOTE — PROGRESS NOTE ADULT - PROBLEM SELECTOR PLAN 3
Found to have acute ischemic MCA stroke s/p cerebral angio w/ thrombectomy with neurosurgery on 10/9/22   - Initially with expressive aphasia and RUE/RLE weakness; however, currently with no focal neuro deficits   - Limited TTE w/ contrast 10/10: No obvious LV thrombus seen.  - c/w Plavix 75 mg PO QD, Atorvastatin 40 mg PO QD  - CT head 10/17 as above  - f/u EP reccs, possible ILR implant on d/c

## 2022-10-17 NOTE — PROGRESS NOTE ADULT - PROBLEM SELECTOR PLAN 1
Currently CP free and hemodynamically stable  - Trop T peaked 0.3  - s/p diagnostic cath @ [facility] 10/7/22: mLAD 90%, pLCx 95%, mRCA 75%, RFA access  - Limited TTE 10/10/22: LVEF 35% with regional WMA.   - Referred for CABG; however, deemed not a surgical candidate 2/2 MCA stroke  - MR cardiac viability 10/14: EF 45% Basal and mid myocardial severe wall thinning of the inferolateral   wall with extension towards the inferior and anterolateral walls. Akinetic inferolateral wall as described above.Normal right ventricular size and function (RVEF: 68%). No LV thrombus. Nonviable basal to mid thinned out inferolateral wall and associated aspects of inferior and anterolateral walls. Question of minimal inferior to inferoseptal wall subendocardial delayed myocardial enhancement along the mid-chamber of less than 50% is viable.  - CTH 10/17: Continued interval evolution of the subacute infarct involving the left subinsular region. No hemorrhage.  -NPO after MN for cardiac cath with Dr. Gandhi tomorrow 10/18 (pt can have light breakfast and to be load with Aspirin 325mg PO x 1 and consented at [facility] office).  - c/w heparin gtt for NSTEMI  - c/w Plavix 75 mg PO QD, Atorvastatin 40 mg PO QD

## 2022-10-18 LAB
ALBUMIN SERPL ELPH-MCNC: 4.3 G/DL — SIGNIFICANT CHANGE UP (ref 3.3–5)
ALP SERPL-CCNC: 80 U/L — SIGNIFICANT CHANGE UP (ref 40–120)
ALT FLD-CCNC: 50 U/L — HIGH (ref 10–45)
ANION GAP SERPL CALC-SCNC: 11 MMOL/L — SIGNIFICANT CHANGE UP (ref 5–17)
ANION GAP SERPL CALC-SCNC: 9 MMOL/L — SIGNIFICANT CHANGE UP (ref 5–17)
APTT BLD: 35.6 SEC — HIGH (ref 27.5–35.5)
APTT BLD: >200 SEC — CRITICAL HIGH (ref 27.5–35.5)
AST SERPL-CCNC: 39 U/L — SIGNIFICANT CHANGE UP (ref 10–40)
BASE EXCESS BLDA CALC-SCNC: -0.5 MMOL/L — SIGNIFICANT CHANGE UP (ref -2–3)
BASE EXCESS BLDV CALC-SCNC: -1.4 MMOL/L — SIGNIFICANT CHANGE UP (ref -2–3)
BASE EXCESS BLDV CALC-SCNC: -1.9 MMOL/L — SIGNIFICANT CHANGE UP (ref -2–3)
BILIRUB SERPL-MCNC: 1.4 MG/DL — HIGH (ref 0.2–1.2)
BUN SERPL-MCNC: 12 MG/DL — SIGNIFICANT CHANGE UP (ref 7–23)
BUN SERPL-MCNC: 13 MG/DL — SIGNIFICANT CHANGE UP (ref 7–23)
CA-I BLDA-SCNC: 1.14 MMOL/L — LOW (ref 1.15–1.33)
CA-I SERPL-SCNC: 1.05 MMOL/L — LOW (ref 1.15–1.33)
CA-I SERPL-SCNC: 1.07 MMOL/L — LOW (ref 1.15–1.33)
CALCIUM SERPL-MCNC: 9.6 MG/DL — SIGNIFICANT CHANGE UP (ref 8.4–10.5)
CALCIUM SERPL-MCNC: 9.8 MG/DL — SIGNIFICANT CHANGE UP (ref 8.4–10.5)
CHLORIDE SERPL-SCNC: 103 MMOL/L — SIGNIFICANT CHANGE UP (ref 96–108)
CHLORIDE SERPL-SCNC: 104 MMOL/L — SIGNIFICANT CHANGE UP (ref 96–108)
CO2 BLDA-SCNC: 24 MMOL/L — SIGNIFICANT CHANGE UP (ref 19–24)
CO2 BLDV-SCNC: 23.3 MMOL/L — SIGNIFICANT CHANGE UP (ref 22–26)
CO2 BLDV-SCNC: 23.9 MMOL/L — SIGNIFICANT CHANGE UP (ref 22–26)
CO2 SERPL-SCNC: 24 MMOL/L — SIGNIFICANT CHANGE UP (ref 22–31)
CO2 SERPL-SCNC: 25 MMOL/L — SIGNIFICANT CHANGE UP (ref 22–31)
COHGB MFR BLDA: 1.5 % — SIGNIFICANT CHANGE UP
COHGB MFR BLDV: 2 % — SIGNIFICANT CHANGE UP
CREAT SERPL-MCNC: 1.14 MG/DL — SIGNIFICANT CHANGE UP (ref 0.5–1.3)
CREAT SERPL-MCNC: 1.19 MG/DL — SIGNIFICANT CHANGE UP (ref 0.5–1.3)
EGFR: 65 ML/MIN/1.73M2 — SIGNIFICANT CHANGE UP
EGFR: 69 ML/MIN/1.73M2 — SIGNIFICANT CHANGE UP
GAS PNL BLDV: 140 MMOL/L — SIGNIFICANT CHANGE UP (ref 136–145)
GAS PNL BLDV: 140 MMOL/L — SIGNIFICANT CHANGE UP (ref 136–145)
GLUCOSE BLDA-MCNC: 94 MG/DL — SIGNIFICANT CHANGE UP (ref 70–99)
GLUCOSE BLDC GLUCOMTR-MCNC: 105 MG/DL — HIGH (ref 70–99)
GLUCOSE BLDC GLUCOMTR-MCNC: 92 MG/DL — SIGNIFICANT CHANGE UP (ref 70–99)
GLUCOSE BLDV-MCNC: 84 MG/DL — SIGNIFICANT CHANGE UP (ref 70–99)
GLUCOSE BLDV-MCNC: 90 MG/DL — SIGNIFICANT CHANGE UP (ref 70–99)
GLUCOSE SERPL-MCNC: 106 MG/DL — HIGH (ref 70–99)
GLUCOSE SERPL-MCNC: 93 MG/DL — SIGNIFICANT CHANGE UP (ref 70–99)
HCO3 BLDA-SCNC: 23 MMOL/L — SIGNIFICANT CHANGE UP (ref 21–28)
HCO3 BLDV-SCNC: 22 MMOL/L — SIGNIFICANT CHANGE UP (ref 22–29)
HCO3 BLDV-SCNC: 23 MMOL/L — SIGNIFICANT CHANGE UP (ref 22–29)
HCT VFR BLD CALC: 40.4 % — SIGNIFICANT CHANGE UP (ref 39–50)
HGB BLD CALC-MCNC: 12.6 G/DL — SIGNIFICANT CHANGE UP (ref 12.6–17.4)
HGB BLD-MCNC: 13.5 G/DL — SIGNIFICANT CHANGE UP (ref 13–17)
HGB BLDA-MCNC: 13.2 G/DL — SIGNIFICANT CHANGE UP (ref 12.6–17.4)
INR BLD: 1.09 — SIGNIFICANT CHANGE UP (ref 0.88–1.16)
INR BLD: 1.14 — SIGNIFICANT CHANGE UP (ref 0.88–1.16)
ISTAT ACTK (ACTIVATED CLOTTING TIME KAOLIN): 126 SEC — SIGNIFICANT CHANGE UP (ref 74–137)
ISTAT ACTK (ACTIVATED CLOTTING TIME KAOLIN): 225 SEC — HIGH (ref 74–137)
ISTAT ACTK (ACTIVATED CLOTTING TIME KAOLIN): 335 SEC — HIGH (ref 74–137)
LACTATE SERPL-SCNC: 1.3 MMOL/L — SIGNIFICANT CHANGE UP (ref 0.5–2)
MAGNESIUM SERPL-MCNC: 2.1 MG/DL — SIGNIFICANT CHANGE UP (ref 1.6–2.6)
MAGNESIUM SERPL-MCNC: 2.1 MG/DL — SIGNIFICANT CHANGE UP (ref 1.6–2.6)
MCHC RBC-ENTMCNC: 31.8 PG — SIGNIFICANT CHANGE UP (ref 27–34)
MCHC RBC-ENTMCNC: 33.4 GM/DL — SIGNIFICANT CHANGE UP (ref 32–36)
MCV RBC AUTO: 95.3 FL — SIGNIFICANT CHANGE UP (ref 80–100)
METHGB MFR BLDA: 0.7 % — SIGNIFICANT CHANGE UP
METHGB MFR BLDV: 1.1 % — SIGNIFICANT CHANGE UP
NRBC # BLD: 0 /100 WBCS — SIGNIFICANT CHANGE UP (ref 0–0)
OXYHGB MFR BLDA: 97 % — HIGH (ref 90–95)
PCO2 BLDA: 33 MMHG — LOW (ref 35–48)
PCO2 BLDV: 35 MMHG — LOW (ref 42–55)
PCO2 BLDV: 36 MMHG — LOW (ref 42–55)
PH BLDA: 7.45 — SIGNIFICANT CHANGE UP (ref 7.35–7.45)
PH BLDV: 7.41 — SIGNIFICANT CHANGE UP (ref 7.32–7.43)
PH BLDV: 7.41 — SIGNIFICANT CHANGE UP (ref 7.32–7.43)
PHOSPHATE SERPL-MCNC: 4.1 MG/DL — SIGNIFICANT CHANGE UP (ref 2.5–4.5)
PLATELET # BLD AUTO: 253 K/UL — SIGNIFICANT CHANGE UP (ref 150–400)
PO2 BLDA: 125 MMHG — HIGH (ref 83–108)
PO2 BLDV: 46 MMHG — HIGH (ref 25–45)
PO2 BLDV: 48 MMHG — HIGH (ref 25–45)
POTASSIUM BLDA-SCNC: 3.7 MMOL/L — SIGNIFICANT CHANGE UP (ref 3.5–5.1)
POTASSIUM BLDV-SCNC: 3.5 MMOL/L — SIGNIFICANT CHANGE UP (ref 3.5–5.1)
POTASSIUM BLDV-SCNC: 3.5 MMOL/L — SIGNIFICANT CHANGE UP (ref 3.5–5.1)
POTASSIUM SERPL-MCNC: 4.2 MMOL/L — SIGNIFICANT CHANGE UP (ref 3.5–5.3)
POTASSIUM SERPL-MCNC: 4.7 MMOL/L — SIGNIFICANT CHANGE UP (ref 3.5–5.3)
POTASSIUM SERPL-SCNC: 4.2 MMOL/L — SIGNIFICANT CHANGE UP (ref 3.5–5.3)
POTASSIUM SERPL-SCNC: 4.7 MMOL/L — SIGNIFICANT CHANGE UP (ref 3.5–5.3)
PROT SERPL-MCNC: 7.3 G/DL — SIGNIFICANT CHANGE UP (ref 6–8.3)
PROTHROM AB SERPL-ACNC: 13 SEC — SIGNIFICANT CHANGE UP (ref 10.5–13.4)
PROTHROM AB SERPL-ACNC: 13.6 SEC — HIGH (ref 10.5–13.4)
RBC # BLD: 4.24 M/UL — SIGNIFICANT CHANGE UP (ref 4.2–5.8)
RBC # FLD: 13.7 % — SIGNIFICANT CHANGE UP (ref 10.3–14.5)
SAO2 % BLDA: 99.2 % — HIGH (ref 94–98)
SAO2 % BLDV: 77 % — SIGNIFICANT CHANGE UP (ref 67–88)
SODIUM BLDA-SCNC: 138 MMOL/L — SIGNIFICANT CHANGE UP (ref 136–145)
SODIUM SERPL-SCNC: 138 MMOL/L — SIGNIFICANT CHANGE UP (ref 135–145)
SODIUM SERPL-SCNC: 138 MMOL/L — SIGNIFICANT CHANGE UP (ref 135–145)
WBC # BLD: 6.11 K/UL — SIGNIFICANT CHANGE UP (ref 3.8–10.5)
WBC # FLD AUTO: 6.11 K/UL — SIGNIFICANT CHANGE UP (ref 3.8–10.5)

## 2022-10-18 PROCEDURE — 93010 ELECTROCARDIOGRAM REPORT: CPT

## 2022-10-18 PROCEDURE — 99233 SBSQ HOSP IP/OBS HIGH 50: CPT

## 2022-10-18 PROCEDURE — 99232 SBSQ HOSP IP/OBS MODERATE 35: CPT

## 2022-10-18 PROCEDURE — 33990 INSJ PERQ VAD L HRT ARTERIAL: CPT

## 2022-10-18 PROCEDURE — 99291 CRITICAL CARE FIRST HOUR: CPT

## 2022-10-18 PROCEDURE — 99232 SBSQ HOSP IP/OBS MODERATE 35: CPT | Mod: GC

## 2022-10-18 PROCEDURE — 92928 PRQ TCAT PLMT NTRAC ST 1 LES: CPT | Mod: LC,59

## 2022-10-18 PROCEDURE — 99152 MOD SED SAME PHYS/QHP 5/>YRS: CPT

## 2022-10-18 PROCEDURE — 93460 R&L HRT ART/VENTRICLE ANGIO: CPT | Mod: 26,59

## 2022-10-18 RX ORDER — SODIUM CHLORIDE 9 MG/ML
500 INJECTION INTRAMUSCULAR; INTRAVENOUS; SUBCUTANEOUS
Refills: 0 | Status: DISCONTINUED | OUTPATIENT
Start: 2022-10-18 | End: 2022-10-19

## 2022-10-18 RX ORDER — ASPIRIN/CALCIUM CARB/MAGNESIUM 324 MG
81 TABLET ORAL DAILY
Refills: 0 | Status: DISCONTINUED | OUTPATIENT
Start: 2022-10-19 | End: 2022-10-19

## 2022-10-18 RX ADMIN — CLOPIDOGREL BISULFATE 75 MILLIGRAM(S): 75 TABLET, FILM COATED ORAL at 11:55

## 2022-10-18 RX ADMIN — SODIUM CHLORIDE 3 MILLILITER(S): 9 INJECTION INTRAMUSCULAR; INTRAVENOUS; SUBCUTANEOUS at 11:33

## 2022-10-18 RX ADMIN — SODIUM CHLORIDE 50 MILLILITER(S): 9 INJECTION INTRAMUSCULAR; INTRAVENOUS; SUBCUTANEOUS at 09:51

## 2022-10-18 RX ADMIN — SODIUM CHLORIDE 3 MILLILITER(S): 9 INJECTION INTRAMUSCULAR; INTRAVENOUS; SUBCUTANEOUS at 22:11

## 2022-10-18 RX ADMIN — Medication 325 MILLIGRAM(S): at 11:56

## 2022-10-18 RX ADMIN — SODIUM CHLORIDE 3 MILLILITER(S): 9 INJECTION INTRAMUSCULAR; INTRAVENOUS; SUBCUTANEOUS at 06:43

## 2022-10-18 RX ADMIN — ATORVASTATIN CALCIUM 40 MILLIGRAM(S): 80 TABLET, FILM COATED ORAL at 22:08

## 2022-10-18 RX ADMIN — PANTOPRAZOLE SODIUM 40 MILLIGRAM(S): 20 TABLET, DELAYED RELEASE ORAL at 06:10

## 2022-10-18 NOTE — PROGRESS NOTE ADULT - PROBLEM SELECTOR PLAN 1
Currently CP free and hemodynamically stable  - Trop T peaked 0.3  - s/p diagnostic cath @ [facility] 10/7/22: mLAD 90%, pLCx 95%, mRCA 75%, RFA access  - Limited TTE 10/10/22: LVEF 35% with regional WMA.   - Referred for CABG; however, deemed not a surgical candidate 2/2 MCA stroke  - MR cardiac viability 10/14: EF 45% Basal and mid myocardial severe wall thinning of the inferolateral   wall with extension towards the inferior and anterolateral walls. Akinetic inferolateral wall as described above.Normal right ventricular size and function (RVEF: 68%). No LV thrombus. Nonviable basal to mid thinned out inferolateral wall and associated aspects of inferior and anterolateral walls. Question of minimal inferior to inferoseptal wall subendocardial delayed myocardial enhancement along the mid-chamber of less than 50% is viable.  - CTH 10/17: Continued interval evolution of the subacute infarct involving the left subinsular region. No hemorrhage.  -NPO after MN for cardiac cath with Dr. Gandhi tomorrow 10/18 (pt can have light breakfast and to be load with Aspirin 325mg PO x 1 and consented at [facility] office).  - c/w heparin gtt for NSTEMI  - c/w Plavix 75 mg PO QD, Atorvastatin 40 mg PO QD Currently CP free and hemodynamically stable  - Trop T peaked 0.3  - s/p diagnostic cath @ [facility] 10/7/22: mLAD 90%, pLCx 95%, mRCA 75%, RFA access  - Limited TTE 10/10/22: LVEF 35% with regional WMA.   - Referred for CABG; however, deemed not a surgical candidate 2/2 MCA stroke  - MR cardiac viability 10/14: EF 45% Basal and mid myocardial severe wall thinning of the inferolateral   wall with extension towards the inferior and anterolateral walls. Akinetic inferolateral wall as described above.Normal right ventricular size and function (RVEF: 68%). No LV thrombus. Nonviable basal to mid thinned out inferolateral wall and associated aspects of inferior and anterolateral walls. Question of minimal inferior to inferoseptal wall subendocardial delayed myocardial enhancement along the mid-chamber of less than 50% is viable.  -NPO after MN for cardiac cath with Dr. Gandhi today, f/u cath report  - c/w heparin gtt pending cath  - Loaded w/ ASA 325mg x1 10/18, c/w ASA 81mg QD, Plavix 75 mg PO QD, Atorvastatin 40 mg PO QD

## 2022-10-18 NOTE — PROGRESS NOTE ADULT - PROBLEM SELECTOR PLAN 5
Bradycardia 20s-40s during hospital admit, now 50s. Patient states his baseline HR 40s.  - EP was consulted with plan for continued monitoring and assessment when able to be weaned off midodrine & post PCI as HR improves slightly with being off phenylephrine.  - Continue to monitor R groin with large ecchymosis, quarter-sized non-tender hard, non-compressible hematoma with bruit appreciated at site of previous cath access, R DP 1+, R PT 2+  - RLE arterial duplex 10/10: thin layer of hypoechoic free fluid is noted superficial to the R CFA c/w confluent edema; no distinct fluid collection is identified; no hematoma or pseudoaneurysm identified.   -Repeat RLE duplex 10/17: No right groin mass or fluid collection. A few benign-appearing right inguinal lymph nodes visualized in the palpable area of concern.  - Continue to monitor

## 2022-10-18 NOTE — PROGRESS NOTE ADULT - PROBLEM SELECTOR PLAN 2
R groin with large ecchymosis, quarter-sized non-tender hard, non-compressible hematoma with bruit appreciated at site of previous cath access, R DP 1+, R PT 2+  - RLE arterial duplex 10/10: thin layer of hypoechoic free fluid is noted superficial to the R CFA c/w confluent edema; no distinct fluid collection is identified; no hematoma or pseudoaneurysm identified.   -Repeat RLE duplex 10/17: No right groin mass or fluid collection. A few benign-appearing right inguinal lymph nodes visualized in the palpable area of concern.  - Continue to monitor Found to have acute ischemic MCA stroke s/p cerebral angio w/ thrombectomy with neurosurgery on 10/9/22   - Initially with expressive aphasia and RUE/RLE weakness; however, currently with no focal neuro deficits   - Limited TTE w/ contrast 10/10: No obvious LV thrombus seen.  - c/w Plavix 75 mg PO QD, Atorvastatin 40 mg PO QD, added ASA 81mg QD as above  - CT head 10/17: Continued interval evolution of the subacute infarct involving the left subinsular region. No hemorrhage.  - f/u EP reccs, possible ILR implant on d/c Found to have acute ischemic MCA stroke s/p cerebral angio w/ thrombectomy with neurosurgery on 10/9/22   - Initially with expressive aphasia and RUE/RLE weakness; however, currently with no focal neuro deficits   - Limited TTE w/ contrast 10/10: No obvious LV thrombus seen.  - c/w Plavix 75 mg PO QD, Atorvastatin 40 mg PO QD, added ASA 81mg QD as above  - CT head 10/17: Continued interval evolution of the subacute infarct involving the left subinsular region. No hemorrhage.  -Neuro stroke following, recs: No need for further imaging at this time, pt cleared for cath/DAPT/heparin, mod risk for cath. f/u further recs

## 2022-10-18 NOTE — CHART NOTE - NSCHARTNOTESELECT_GEN_ALL_CORE
Nutrition Services
Stroke Code/Event Note
CTS PA/Off Service Note
Post thrombectomy NIHSS/Event Note

## 2022-10-18 NOTE — PROGRESS NOTE ADULT - ASSESSMENT
72M nicotine dependence no known PMH (doesn’t follow doctors) presented on 10/7 with intermittent CP x5d, found with NSTEMI 3V CAD, course c/b syncopal episode (found on floor with R forehead lac, aphasia, R weakness) while awaiting cabg, found w/ L M1 CVA on 10/9 s/p mech aspiration thrombectomy c/b R groin hematoma. Briefly on aleena for BP support in ludwin-infarct period of CVA. Had been on dopamine for HR support (HR range 30s-50s), asymptomatic.    #HFrEF/ICM:  - GDMT: Plan to start after cath and allow time for stability off midodrine.  Off midodrine now. BPs are in the 110s.   - Volume: euvolemic, off diuretics  - Etiology: ischemic.  - Device: not candidate at this point  cMRI shows EF 45% and scarred/thinned out basal/mid inferolateral walls and inferior and anterolateral walls. Otherwise the remaining walls are nl. nl RV fxn. Initial echo showed ef 20% but subsequent contrast echo was ~35%.

## 2022-10-18 NOTE — PROGRESS NOTE ADULT - PROBLEM SELECTOR PLAN 6
15-20 pack years  - Encourage smoking cessation    F: none  E: Replete if K+ <4 and Mg <2  N: NPO after MN  DVT ppx: on hep gtt  Dispo: pending cardiac cath 10/18 with Dr. Gandhi    Case discussed with Dr. Adame 15-20 pack years  - Encourage smoking cessation    F: none  E: Replete if K+ <4 and Mg <2  N: DASH. NPO after MN for cardiac cath today  DVT ppx: Heparin ggt for NSTEMI pending cath  Dispo: pending cath today    Case d/w Dr. Adame 15-20 pack years  - Encourage smoking cessation    F: none  E: Replete if K+ <4 and Mg <2  N: DASH. NPO after MN for cardiac cath today  DVT ppx: Heparin ggt for NSTEMI pending cath  PT recs acute rehab, but given no focal deficits reeval requested, f/u  Dispo: pending cath today    Case d/w Dr. Adame 15-20 pack years  - Encourage smoking cessation    F: none  E: Replete if K+ <4 and Mg <2  N: DASH. NPO after MN for cardiac cath today  DVT ppx: Heparin ggt for NSTEMI pending cath  PT recs acute rehab, but reassessment now reqs home w/ no needs  Dispo: pending cath today    Case d/w Dr. Adame

## 2022-10-18 NOTE — PROGRESS NOTE ADULT - ASSESSMENT
Patient is a 72-year-old male with nicotine dependence who has not followed with physicians regularly, presented to the emergency department with complaint of substernal chest pain radiating to bilateral arms, planned to undergo cardiac bypass this week. Stroke code was called on 10/9 after patient was found on the floor with a large right forehead laceration, aphasic, and with right sided weakness. He was brought to [facility] which was negative for hemorrhage, CTA did show left MCA occlusion. Initial NIHSS 21. Pt s/p thrombectomy TICI 0-3, with improvement in NIHSS. Pt has been on aspirin, plavix, heparin drip. Head CT done on 10/17 with small stroke, no bleed. Pt medium risk for cardiac cath and likely PCI, cleared from neurologic perspective.    1)Secondary stroke prevention  -on DAPT and heparin drip - ok for DAPT post cath  - Atorvastatin 40 mg    2) Stroke risk factors  - A1C: 5.4  - LDL: 113  -CAD  - HTN    3) Further management  -Head CT 10/10 with slight left MCA hypodensity  -Head CT 10/17 with continued evolution of left subinsular region stroke, no hemorrhage  -repeat TTE with contrast also did not show any LV thrombus. Would consider THAO and loop to further investigate cause of stroke  -q4 hour stroke neuro checks  - may need outpt neurology follow up  - provide stroke education    DVT prophylaxis   -heparin drip and SCDs    Case discussed with Dr Greene

## 2022-10-18 NOTE — PROGRESS NOTE ADULT - SUBJECTIVE AND OBJECTIVE BOX
TRANSFER FROM CARDIAC TELEMETRY TO CCU    72-year-old M, current 1/2 PPD smoker x 30-40 years,  poor medical follow up, no known PMHx who presented to [facility] c/o recurrent episodes of substernal chest pain (10/3 AM,10/4 PM) radiating to B/L arms upon awakening lasting 15 minutes, that resolved with ASA, EKG revealed inverted T-waves in the lateral leads. Trop T peaked at 0.34, and patient was started on heparin gtt for NSTEMI. A cardiac cath on 10/7 showed 3vCADs(mLAD 90%, pLCx 95%, mRCA 75%). TTE revealed newly reduced EF 15-20% (Repeated TTE EF 35%). He was initially transferred to [facility] under Dr. Collins for CABG; however ccb acute ischemic MCA stroke (r-sided weakness), s/p cerebral angio w/ thrombectomy with neurosurgery on 10/9/22 (initially expressive aphasia but strength on R side improving). Hospital course c/b hypotension (started on Midodrine) & bradycardia 20s-40s (now improved to high 40s-50s) for which EP was consulted with plan for continued monitoring and assessment when able to be weaned off midodrine & post PCI as HR improves slightly with being off phenylephrine  Deemed no longer a surgical candidate. Plan for cardiac cath tomorrow 10/18 with Dr. Gandhi.     evaluation. In the ER patient was found to have inverted T waves in the lateral leads and markedly elevated troponin. Patient was chest pain-free upon arrival in the ER. Emergency department spoke   with interventional cardiology who recommend loading the patient with Plavix starting on heparin drip for planned cardiac catheterization. Patient denies any associated neck or jaw pain   with his chest pain symptoms. Patient denies any cardiac evaluation in the past. He denies any family history of heart disease. Cardiac cath showed (90% mLAD, 95% pLCX, 75% mRCA).  Denies GI bleeding, stroke, melena, varicosities.   TRANSFER FROM CARDIAC TELEMETRY TO CCU    72-year-old M, current 1/2 PPD smoker x 30-40 years,  poor medical follow up, no known PMHx who presented to [facility] c/o recurrent episodes of substernal chest pain (10/3 AM,10/4 PM) radiating to B/L arms upon awakening lasting 15 minutes, that resolved with ASA, EKG revealed inverted T-waves in the lateral leads. Trop T peaked at 0.34, and patient was started on heparin gtt for NSTEMI. A cardiac cath on 10/7 showed 3vCADs(mLAD 90%, pLCx 95%, mRCA 75%). TTE revealed newly reduced EF 15-20% (Repeated TTE EF 35%). He was initially transferred to [facility] under Dr. Collins for CABG; however ccb acute ischemic MCA stroke (r-sided weakness), s/p cerebral angio w/ thrombectomy with neurosurgery on 10/9/22 (initially expressive aphasia but now neurological exam without deficits). On 10/10 R. groin hematoma was noted, with duplex negative for hematoma and pseudoaneurysm. Hospital course c/b hypotension (started on Midodrine) & bradycardia 20s-40s (now improved to high 40s-50s) for which EP was consulted with plan for continued monitoring and assessment when able to be weaned off midodrine & post PCI as HR improves slightly with being off phenylephrine.  On 10/18, patient cleared by Neurology for PCI (>1 week since stroke), and received impella-assisted PCI s/p LAD/LCX.    Still with low BP and requiring midodrine.  Pt feels on cooler side to me and I am worried cardiac output is low.  Cleared from Neurology for PCI (>1 wk since stroke) and given urgency of PCI (NSTEMI) no point in delaying care.  We had a long discussion with pt/family about risks/benefits of PCI  Given no LV clot on MRI and low LVEF/hypotension/cardiogenic shock will plan for RHC/Impella and PCI of LAD/LCX. The RCA disease appears moderate and stable. WIll medically        TRANSFER ACCEPTANCE FROM CARDIAC TELEMETRY TO CCU    72-year-old M, current 1/2 PPD smoker x 30-40 years,  poor medical follow up, no known PMHx who presented to [facility] c/o recurrent episodes of substernal chest pain (10/3 AM,10/4 PM) radiating to B/L arms upon awakening lasting 15 minutes, that resolved with ASA, EKG revealed inverted T-waves in the lateral leads. Trop T peaked at 0.34, and patient was started on heparin gtt for NSTEMI. A cardiac cath on 10/7 showed 3vCADs(mLAD 90%, pLCx 95%, mRCA 75%). TTE revealed newly reduced EF 15-20% (Repeated TTE EF 35%). He was initially transferred to [facility] under Dr. Collins for CABG; however ccb acute ischemic MCA stroke (r-sided weakness), s/p cerebral angio w/ thrombectomy with neurosurgery on 10/9/22 (initially expressive aphasia but now neurological exam without deficits). On 10/10 R. groin hematoma was noted, with duplex negative for hematoma and pseudoaneurysm. Hospital course c/b hypotension (started on Midodrine) & bradycardia 20s-40s (now improved to high 40s-50s) for which EP was consulted with plan for continued monitoring and assessment when able to be weaned off midodrine & post PCI as HR improves slightly with being off phenylephrine.  On 10/18, patient cleared by Neurology for PCI (>1 week since stroke), and received impella-assisted PCI s/p LAD/LCX. The RCA residual disease is moderate and stable. He was then transferred to CCU for management.       Subjective: Patient seen at bedside, NAD. Gauze and early bird seen on left groin. Saturated after 1 hour, held pressure and put on pressure bandage.     ICU Vital Signs Last 24 Hrs  T(C): 36.6 (18 Oct 2022 18:15), Max: 37.4 (18 Oct 2022 05:07)  T(F): 97.9 (18 Oct 2022 18:15), Max: 99.4 (18 Oct 2022 05:07)  HR: 56 (18 Oct 2022 19:30) (45 - 59)  BP: 129/71 (18 Oct 2022 19:15) (112/58 - 144/90)  BP(mean): 94 (18 Oct 2022 19:15) (77 - 108)  ABP: --  ABP(mean): --  RR: 21 (18 Oct 2022 19:30) (14 - 24)  SpO2: 99% (18 Oct 2022 19:30) (94% - 100%)    O2 Parameters below as of 18 Oct 2022 18:30  Patient On (Oxygen Delivery Method): room air          I&O's Summary    17 Oct 2022 07:01  -  18 Oct 2022 07:00  --------------------------------------------------------  IN: 704 mL / OUT: 0 mL / NET: 704 mL    18 Oct 2022 07:01  -  18 Oct 2022 19:33  --------------------------------------------------------  IN: 280 mL / OUT: 0 mL / NET: 280 mL      MEDICATIONS  (STANDING):  atorvastatin 40 milliGRAM(s) Oral at bedtime  clopidogrel Tablet 75 milliGRAM(s) Oral daily  pantoprazole    Tablet 40 milliGRAM(s) Oral before breakfast  senna 2 Tablet(s) Oral at bedtime  sodium chloride 0.9% lock flush 3 milliLiter(s) IV Push every 8 hours  sodium chloride 0.9%. 500 milliLiter(s) (50 mL/Hr) IV Continuous <Continuous>    MEDICATIONS  (PRN):  acetaminophen     Tablet .. 650 milliGRAM(s) Oral every 6 hours PRN Temp greater or equal to 38C (100.4F), Mild Pain (1 - 3)      PHYSICAL EXAM:  GENERAL:   HEAD:  Atraumatic, Normocephalic  EYES: EOMI, PERRLA, conjunctiva and sclera clear. Xanthomas seen on eyelids.   NECK: Supple, No JVD, Normal thyroid, no enlarged nodes  NERVOUS SYSTEM:  Alert & Awake.   CHEST/LUNG: B/L good air entry, no rales, rhonchi, or wheezing  HEART: S1S2 normal, no S3, Regular rate and rhythm; No murmurs  ABDOMEN: Soft, Nontender, Nondistended; Bowel sounds present. Impella removal site packed with bandage CDI.   EXTREMITIES:  2+ Peripheral Pulses, No clubbing, cyanosis, or edema  LYMPH: No lymphadenopathy noted  SKIN: No rashes or lesions    LABS:                        13.5   6.11  )-----------( 253      ( 18 Oct 2022 05:30 )             40.4     10-18    138  |  103  |  12  ----------------------------<  93  4.7   |  24  |  1.14    Ca    9.8      18 Oct 2022 18:19  Phos  4.1     10-18  Mg     2.1     10-18    TPro  7.3  /  Alb  4.3  /  TBili  1.4<H>  /  DBili  x   /  AST  39  /  ALT  50<H>  /  AlkPhos  80  10-18    LIVER FUNCTIONS - ( 18 Oct 2022 18:19 )  Alb: 4.3 g/dL / Pro: 7.3 g/dL / ALK PHOS: 80 U/L / ALT: 50 U/L / AST: 39 U/L / GGT: x           PT/INR - ( 18 Oct 2022 18:19 )   PT: 13.6 sec;   INR: 1.14          PTT - ( 18 Oct 2022 18:19 )  PTT:>200.0 sec  CAPILLARY BLOOD GLUCOSE      POCT Blood Glucose.: 92 mg/dL (18 Oct 2022 12:21)  POCT Blood Glucose.: 105 mg/dL (18 Oct 2022 07:01)    ABG - ( 18 Oct 2022 16:00 )  pH, Arterial: 7.45  pH, Blood: x     /  pCO2: 33    /  pO2: 125   / HCO3: 23    / Base Excess: -0.5  /  SaO2: 99.2        RADIOLOGY & ADDITIONAL TESTS:  CXR:        Care Discussed with Consultants/Other Providers [ x] YES  [ ] NO

## 2022-10-18 NOTE — PROGRESS NOTE ADULT - ASSESSMENT
72-year-old M, current 1/2 PPD smoker x 30-40 years,  poor medical follow up, no known PMHx who presented to [facility] c/o recurrent episodes SSCP radiating to B/L arms on waking that resolved with ASA, EKG revealed TWI lateral leads, trop T peaked 0.34, started on heparin gtt for NSTEMI, underwent cardiac cath on 10/7 revealing 3vCADs(mLAD 90%, pLCx 95%, mRCA 75%). TTE revealed newly reduced EF 15-20% (Repeated TTE EF 35%) Transferred to [facility] under Dr. Collins for CABG; however found to have acute ischemic MCA stroke, s/p cerebral angio w/ thrombectomy with neurosurgery on 10/9/22 (continues with expressive aphasia but strength on R side improving). Hospital course c/b hypotension (started on Midodrine) & bradycardia 20s-40s (now improved to high 40s-50s) for which EP was consulted with plan for continued monitoring and assessment when able to be weaned off midodrine & post PCI as HR improves slightly with being off phenylephrine  Deemed no longer a surgical candidate. Plan for cardiac cath tomorrow 10/18 with Dr. Gandhi.     Precath Checklist    ASA III				Mallampati class: III	              Sedation Plan:   [  ] None   [x] Moderate   [  ]  Deep    [  ]  General Anesthesia   Patient Is Suitable Candidate For Sedation?     [ x ] Yes   [  ] No   [  ] Not Applicable   Cath Order Entered: [ x ] Yes  DAPT LOAD Ordered: [  ] Yes  [x  ] No load 2/2 pt is on heparin gtt and Plavix 75mg po daily. H/H stable, pt is to be load with Aspirin 325mg tomorrow 10/18.   Pre-Cath fluids Ordered: [  ] Yes  [ x] Not indicated 2/2 EF 15-20% with Cr 1.17  Per Dr. Gandhi, pt can have a light breakfast 10/18    Risks & benefits of procedure and sedation and risks and benefits for the alternative therapy have been explained to the patient and/or HCP in layman’s terms including but not limited to: allergic reaction, bleeding, infection, arrhythmia, respiratory compromise, renal and vascular compromise, limb damage, MI, CVA, emergent CABG/Vascular Surgery and death. Informed consent obtained and in chart.

## 2022-10-18 NOTE — PROGRESS NOTE ADULT - PROBLEM SELECTOR PROBLEM 1
Vibha Merchant, CMA  
NSTEMI (non-ST elevation myocardial infarction)

## 2022-10-18 NOTE — PROGRESS NOTE ADULT - SUBJECTIVE AND OBJECTIVE BOX
Interventional Cardiology PA Adult Progress Note    Subjective Assessment:    Pt seen and assessed at bedside. Pt appears comfortable, is in no acute distress, and has no complaints at this time.  12 point ROS negative other than what specified.  	  MEDICATIONS:  acetaminophen     Tablet .. 650 milliGRAM(s) Oral every 6 hours PRN  pantoprazole    Tablet 40 milliGRAM(s) Oral before breakfast  senna 2 Tablet(s) Oral at bedtime  atorvastatin 40 milliGRAM(s) Oral at bedtime  clopidogrel Tablet 75 milliGRAM(s) Oral daily  heparin  Infusion 850 Unit(s)/Hr IV Continuous <Continuous>  sodium chloride 0.9% lock flush 3 milliLiter(s) IV Push every 8 hours  sodium chloride 0.9%. 500 milliLiter(s) IV Continuous <Continuous>    [PHYSICAL EXAM:  TELEMETRY:  T(C): 36.5 (10-18-22 @ 10:52), Max: 37.4 (10-18-22 @ 05:07)  HR: 51 (10-18-22 @ 11:58) (45 - 55)  BP: 113/67 (10-18-22 @ 11:58) (112/58 - 146/69)  RR: 18 (10-18-22 @ 11:58) (16 - 18)  SpO2: 97% (10-18-22 @ 11:58) (94% - 97%)  Wt(kg): --  I&O's Summary    17 Oct 2022 07:01  -  18 Oct 2022 07:00  --------------------------------------------------------  IN: 704 mL / OUT: 0 mL / NET: 704 mL    18 Oct 2022 07:01  -  18 Oct 2022 12:19  --------------------------------------------------------  IN: 180 mL / OUT: 0 mL / NET: 180 mL    Welch:  Central/PICC/Mid Line:                                         Appearance: Normal	  HEENT:   Normal oral mucosa, PERRL, EOMI	  Neck: Supple, - JVD; no carotid Bruit   Cardiovascular: Normal S1 S2, No JVD, No murmurs,   Respiratory: Lungs clear to auscultation, No Rales, Rhonchi, or Wheezing	  Gastrointestinal:  Soft, Non-tender, + BS	  Skin: No rashes, No ecchymoses, No cyanosis  Extremities: Normal range of motion, No clubbing, cyanosis or edema  Vascular: Peripheral pulses palpable 2+ bilaterally  Neurologic: Non-focal  Psychiatry: A & O x 3, Mood & affect appropriate  	    ECG:  	  RADIOLOGY:   DIAGNOSTIC TESTING:  [ ] Echocardiogram:  [ ]  Catheterization:  [ ] Stress Test:    [ ] THAO  OTHER: 	    LABS:	 	  CARDIAC MARKERS:                        13.5   6.11  )-----------( 253      ( 18 Oct 2022 05:30 )             40.4     10-18    138  |  104  |  13  ----------------------------<  106<H>  4.2   |  25  |  1.19    Ca    9.6      18 Oct 2022 05:30  Mg     2.1     10-18      proBNP:   Lipid Profile:   HgA1c:   TSH:   PT/INR - ( 18 Oct 2022 05:30 )   PT: 13.0 sec;   INR: 1.09          PTT - ( 17 Oct 2022 12:02 )  PTT:57.3 sec     Interventional Cardiology PA Adult Progress Note    Subjective Assessment:    Pt seen and assessed at bedside. Pt appears comfortable, is in no acute distress, and has no complaints at this time. Plan for pt to remain NPO after breakfast for staged PCI w/ Dr. Gandhi today (10/18) d/w pt and pt in agreement.  12 point ROS negative other than what specified.  	  MEDICATIONS:  acetaminophen     Tablet .. 650 milliGRAM(s) Oral every 6 hours PRN  pantoprazole    Tablet 40 milliGRAM(s) Oral before breakfast  senna 2 Tablet(s) Oral at bedtime  atorvastatin 40 milliGRAM(s) Oral at bedtime  clopidogrel Tablet 75 milliGRAM(s) Oral daily  heparin  Infusion 850 Unit(s)/Hr IV Continuous <Continuous>  sodium chloride 0.9% lock flush 3 milliLiter(s) IV Push every 8 hours  sodium chloride 0.9%. 500 milliLiter(s) IV Continuous <Continuous>    [PHYSICAL EXAM:  TELEMETRY:  T(C): 36.5 (10-18-22 @ 10:52), Max: 37.4 (10-18-22 @ 05:07)  HR: 51 (10-18-22 @ 11:58) (45 - 55)  BP: 113/67 (10-18-22 @ 11:58) (112/58 - 146/69)  RR: 18 (10-18-22 @ 11:58) (16 - 18)  SpO2: 97% (10-18-22 @ 11:58) (94% - 97%)  Wt(kg): --  I&O's Summary    17 Oct 2022 07:01  -  18 Oct 2022 07:00  --------------------------------------------------------  IN: 704 mL / OUT: 0 mL / NET: 704 mL    18 Oct 2022 07:01  -  18 Oct 2022 12:19  --------------------------------------------------------  IN: 180 mL / OUT: 0 mL / NET: 180 mL    Welch:  Central/PICC/Mid Line:                                         Appearance: Normal	  HEENT:   Normal oral mucosa, PERRL, EOMI	  Neck: Supple, - JVD; no carotid Bruit   Cardiovascular: Normal S1 S2, No JVD, No murmurs,   Respiratory: Lungs clear to auscultation, No Rales, Rhonchi, or Wheezing	  Gastrointestinal:  Soft, Non-tender, + BS	  Skin: No rashes, No ecchymoses, No cyanosis  Extremities: Normal range of motion, No clubbing, cyanosis or edema  Vascular: Peripheral pulses palpable 2+ bilaterally  Neurologic: Non-focal  Psychiatry: A & O x 3, Mood & affect appropriate  	    ECG:  	  RADIOLOGY:   DIAGNOSTIC TESTING:  [ ] Echocardiogram:  [ ]  Catheterization:  [ ] Stress Test:    [ ] THAO  OTHER: 	    LABS:	 	  CARDIAC MARKERS:                        13.5   6.11  )-----------( 253      ( 18 Oct 2022 05:30 )             40.4     10-18    138  |  104  |  13  ----------------------------<  106<H>  4.2   |  25  |  1.19    Ca    9.6      18 Oct 2022 05:30  Mg     2.1     10-18      proBNP:   Lipid Profile:   HgA1c:   TSH:   PT/INR - ( 18 Oct 2022 05:30 )   PT: 13.0 sec;   INR: 1.09     PTT - ( 17 Oct 2022 12:02 )  PTT:57.3 sec

## 2022-10-18 NOTE — PROGRESS NOTE ADULT - SUBJECTIVE AND OBJECTIVE BOX
Pt seen/examined today  Still with low BP and requiring midodrine.  Pt feels on cooler side to me and I am worried cardiac output is low.  Cleared from Neurology for PCI (>1 wk since stroke) and given urgency of PCI (NSTEMI) no point in delaying care.  We had a long discussion with pt/family about risks/benefits of PCI  Given no LV clot on MRI and low LVEF/hypotension/cardiogenic shock will plan for RHC/Impella and PCI of LAD/LCX. The RCA disease appears moderate and stable. WIll medically manage that.

## 2022-10-18 NOTE — PROGRESS NOTE ADULT - SUBJECTIVE AND OBJECTIVE BOX
Doing well overall. No SOB. Plan for cath today.    Medications:  acetaminophen     Tablet .. 650 milliGRAM(s) Oral every 6 hours PRN  aspirin 325 milliGRAM(s) Oral once  atorvastatin 40 milliGRAM(s) Oral at bedtime  clopidogrel Tablet 75 milliGRAM(s) Oral daily  heparin  Infusion 850 Unit(s)/Hr IV Continuous <Continuous>  pantoprazole    Tablet 40 milliGRAM(s) Oral before breakfast  senna 2 Tablet(s) Oral at bedtime  sodium chloride 0.9% lock flush 3 milliLiter(s) IV Push every 8 hours  sodium chloride 0.9%. 500 milliLiter(s) IV Continuous <Continuous>    Vitals:  T(C): 36.5 (10-18-22 @ 10:52), Max: 37.4 (10-18-22 @ 05:07)  HR: 50 (10-18-22 @ 08:28) (45 - 55)  BP: 119/59 (10-18-22 @ 08:28) (112/58 - 146/69)  BP(mean): 79 (10-18-22 @ 05:17) (77 - 79)  ABP: --  ABP(mean): --  RR: 18 (10-18-22 @ 08:28) (16 - 18)  SpO2: 97% (10-18-22 @ 08:28) (94% - 97%)  Wt(kg): --  CVP(cm H2O): --  CO: --  CI: --  PA: --  PA(mean): --  PCWP: --  SVR: --  PVR: --    Daily     Daily Weight in k.8 (18 Oct 2022 05:07)        I&O's Summary    17 Oct 2022 07:  -  18 Oct 2022 07:00  --------------------------------------------------------  IN: 704 mL / OUT: 0 mL / NET: 704 mL    18 Oct 2022 07:  -  18 Oct 2022 11:46  --------------------------------------------------------  IN: 180 mL / OUT: 0 mL / NET: 180 mL        Physical Exam:  Appearance: No Acute Distress  HEENT: JVP 6 cm H2O, no HJR  Cardiovascular: RRR, Normal S1 S2, No murmurs/rubs/gallops  Respiratory: Clear to auscultation bilaterally  Gastrointestinal: Soft, Non-tender, non-distended	  Extremities: No LE edema, warm and well perfused  Psychiatry: A & O x 3,       Labs:                        13.5   6.11  )-----------( 253      ( 18 Oct 2022 05:30 )             40.4     10-18    138  |  104  |  13  ----------------------------<  106<H>  4.2   |  25  |  1.19    Ca    9.6      18 Oct 2022 05:30  Mg     2.1     10-18        PT/INR - ( 18 Oct 2022 05:30 )   PT: 13.0 sec;   INR: 1.09          PTT - ( 17 Oct 2022 12:02 )  PTT:57.3 sec                  TELEMETRY:    [ ] Echocardiogram:

## 2022-10-18 NOTE — CHART NOTE - NSCHARTNOTEFT_GEN_A_CORE
71 YO Male, current smoker, with no known PMHx originally presented to [facility] c/o 2 episodes of chest pain. Patient states that on 10/3, while at rest, he began to experience substernal chest pain w/ radiation to bilateral upper extremities, lasted about 15 minutes and resolved with Aspirin. On 10/6 patient experienced another episode of similar chest pain that again resolved with Aspirin, prompting patient to go to the ED. While in [facility] ED, patient's troponin was elevated and EKG was noted to have inverted T-waves in the lateral leads. Patient given Plavix load and started on heparin gtt. On 10/7 patient underwent cardiac cath that revealed 90% mLAD, 95% pLCX, 75% mRCA. Patient was transferred to [facility] under the care of Dr. Collins for further work-up and intervention. TTE significant for EF of 15-20%. Patient was undergoing pre-operative work-up for CABG, when on 10/9/22 patient found down w/ R-sided plegia & aphasia, stroke code called and pt found to have acute ischemic MCA stroke on CT imaging, no hemorrhage. Patient underwent cerebral angio w/ thrombectomy with neurosurgery on 10/9/22. Patient recovered in CTICU. On 10/10/22 patient was able to move R side, expressive aphasia as well, pt worked w/ PT/OT/S&S. Henry given for low BP, flores to 30s, given . Hematoma noted in R groin, vascular consulted. TTE negative for thrombus. 10/11 CT head repeated and revealed evolving infarct with no hemorrhage. 10/12 Henry given, Dopamine given and weaned off, midodrine started. HF consulted, arterial line d/c'd. Motor function continued to improve. 10/13 EP consulted, midodrine increased. 10/14 Pt asx and flores to high 20s, henry weaned off. Pt transferred to [facility]. 10/15 patient in stable condition. NO CABG this admission, transferred to cardiology service.    Plan:  Problem 1: CAD  - Patient no longer surgical candidate 2/2 to acute MCA stroke  - Patient was transferred to Cardiology service  - Underwent Impella assisted PCI today 10/18/22  - CTS to sign off patient, please reconsult with any further questions or concerns.  - Care per Primary Team

## 2022-10-18 NOTE — PROGRESS NOTE ADULT - ASSESSMENT
72-year-old M, current 1/2 PPD smoker x 30-40 years,  poor medical follow up, no known PMHx who initially presented to OSH with CC of recurrent episodes of sscp radiating to b/l arms which resolved with aspirin, found to have NSTEMI, s/p cardiac cath on 10/7 revealing 3vCADs(mLAD 90%, pLCx 95%, mRCA 75%) and newly reduced EF 15-20% (Repeated TTE EF 35%), transferred to [facility] under Dr. Collins for CABG; however ccb have acute ischemic MCA stroke, s/p cerebral angio w/ thrombectomy with neurosurgery on 10/9/22, also hypotension and bradycardia. He was deemed no longer a surgical candidate, cleared by neurology for cath 10/18, s/p cath with PCI to LAD and LCX with stenting (residual right disease deemed stable), now on CCU for management.     Problems  #Neuro  Found to have acute ischemic MCA stroke s/p cerebral angio w/ thrombectomy with neurosurgery on 10/9/22. Initially with expressive aphasia and RUE/RLE weakness; however, currently with no focal neuro deficits. CT head 10/17: Continued interval evolution of the subacute infarct involving the left subinsular region. No hemorrhage.  - c/w Plavix 75 mg PO QD, Atorvastatin 40 mg PO QD, and ASA 81mg QD  -Neuro stroke following, recs: No need for further imaging at this time.  -q6 neuro checks.     CV    #NSTEMI non-ST elevation myocardial infarction   3vCADs(mLAD 90%, pLCx 95%, mRCA 75%)  ( Trop T peaked 0.3). s/p PCI to mLAD and pLCx 10/18).     - c/w Plavix 75 mg PO QD, Atorvastatin 40 mg PO QD, and ASA 81mg QD  - f/u TTE  - f/u Coags, lactate, electrolytes.     ###Bradycardia.    Bradycardia 20s-40s during hospital admit, now 50s. Patient states his baseline HR 40s.  - EP following w/ plan for continued monitoring and assessment off midodrine & post PCI as HR improves off Midodrine  - Continue to monitor.    Pulm  #GUSTABO    GI  #GUSTABO    #Nutrition  - Start on DASH diet.    #MSK  Stable Hematoma  R groin with large ecchymosis, quarter-sized non-tender hard, non-compressible hematoma with bruit appreciated at site of previous cath access, R DP 1+, R PT 2+  - RLE arterial duplex 10/10: thin layer of hypoechoic free fluid is noted superficial to the R CFA c/w confluent edema; no distinct fluid collection is identified; no hematoma or pseudoaneurysm identified.   -Repeat RLE duplex 10/17: No right groin mass or fluid collection. A few benign-appearing right inguinal lymph nodes visualized in the palpable area of concern.  - Continue to monitor.    #Nicotine dependence.   ·  Plan: 15-20 pack years  - Encourage smoking cessation    F: none  E: Replete aggressively K+ <4 and Mg <2  N: DASH Diet  DVT ppx: Heparin ggt for NSTEMI pending cathDispo: CCU

## 2022-10-18 NOTE — PROGRESS NOTE ADULT - PROBLEM SELECTOR PLAN 4
Appears euvolemic, not in exacerbation, saturating well on RA, no pedal edema B/L, lungs CTA  - TTE 10/8/22: LVEF 15-20% with regional WMA, grade I LV DD, mild-mod MR  - Limited TTE 10/10/22: LVEF 35% with regional WMA.   - Hold Midodrine in the AM   - Unable to tolerate BB 2/2 SB 40s  - Plan to start GDMT once he is able to come off midodrine.   - Advanced HF team consulted, appreciate reccs Appears euvolemic, not in exacerbation, saturating well on RA, no pedal edema B/L, lungs CTA  - TTE 10/8/22: LVEF 15-20% with regional WMA, G1DD, mild-mod MR  - Limited TTE 10/10/22: LVEF 35% w/ extensive RWMA.   - Midodrine now DC'd  - Unable to tolerate BB 2/2 SB 40s  - Consider adding Losartan for GDMT after cath  - Advanced HF team consulted, appreciate recs

## 2022-10-18 NOTE — PROGRESS NOTE ADULT - PROBLEM SELECTOR PLAN 3
Found to have acute ischemic MCA stroke s/p cerebral angio w/ thrombectomy with neurosurgery on 10/9/22   - Initially with expressive aphasia and RUE/RLE weakness; however, currently with no focal neuro deficits   - Limited TTE w/ contrast 10/10: No obvious LV thrombus seen.  - c/w Plavix 75 mg PO QD, Atorvastatin 40 mg PO QD  - CT head 10/17 as above  - f/u EP reccs, possible ILR implant on d/c Bradycardia 20s-40s during hospital admit, now 50s. Patient states his baseline HR 40s.  - EP was consulted with plan for continued monitoring and assessment off midodrine & post PCI as HR improves off Midodrine  - Continue to monitor Bradycardia 20s-40s during hospital admit, now 50s. Patient states his baseline HR 40s.  - EP following w/ plan for continued monitoring and assessment off midodrine & post PCI as HR improves off Midodrine  - Continue to monitor

## 2022-10-19 ENCOUNTER — TRANSCRIPTION ENCOUNTER (OUTPATIENT)
Age: 72
End: 2022-10-19

## 2022-10-19 VITALS
RESPIRATION RATE: 21 BRPM | OXYGEN SATURATION: 100 % | DIASTOLIC BLOOD PRESSURE: 58 MMHG | SYSTOLIC BLOOD PRESSURE: 114 MMHG | HEART RATE: 54 BPM

## 2022-10-19 LAB
ALBUMIN SERPL ELPH-MCNC: 3.6 G/DL — SIGNIFICANT CHANGE UP (ref 3.3–5)
ALP SERPL-CCNC: 70 U/L — SIGNIFICANT CHANGE UP (ref 40–120)
ALT FLD-CCNC: 47 U/L — HIGH (ref 10–45)
ANION GAP SERPL CALC-SCNC: 11 MMOL/L — SIGNIFICANT CHANGE UP (ref 5–17)
ANION GAP SERPL CALC-SCNC: 12 MMOL/L — SIGNIFICANT CHANGE UP (ref 5–17)
APTT BLD: 32.9 SEC — SIGNIFICANT CHANGE UP (ref 27.5–35.5)
AST SERPL-CCNC: 42 U/L — HIGH (ref 10–40)
BASOPHILS # BLD AUTO: 0.05 K/UL — SIGNIFICANT CHANGE UP (ref 0–0.2)
BASOPHILS NFR BLD AUTO: 0.7 % — SIGNIFICANT CHANGE UP (ref 0–2)
BILIRUB SERPL-MCNC: 1.2 MG/DL — SIGNIFICANT CHANGE UP (ref 0.2–1.2)
BUN SERPL-MCNC: 14 MG/DL — SIGNIFICANT CHANGE UP (ref 7–23)
BUN SERPL-MCNC: 14 MG/DL — SIGNIFICANT CHANGE UP (ref 7–23)
CALCIUM SERPL-MCNC: 9.2 MG/DL — SIGNIFICANT CHANGE UP (ref 8.4–10.5)
CALCIUM SERPL-MCNC: 9.3 MG/DL — SIGNIFICANT CHANGE UP (ref 8.4–10.5)
CHLORIDE SERPL-SCNC: 101 MMOL/L — SIGNIFICANT CHANGE UP (ref 96–108)
CHLORIDE SERPL-SCNC: 101 MMOL/L — SIGNIFICANT CHANGE UP (ref 96–108)
CO2 SERPL-SCNC: 18 MMOL/L — LOW (ref 22–31)
CO2 SERPL-SCNC: 23 MMOL/L — SIGNIFICANT CHANGE UP (ref 22–31)
CREAT SERPL-MCNC: 1.18 MG/DL — SIGNIFICANT CHANGE UP (ref 0.5–1.3)
CREAT SERPL-MCNC: 1.2 MG/DL — SIGNIFICANT CHANGE UP (ref 0.5–1.3)
EGFR: 65 ML/MIN/1.73M2 — SIGNIFICANT CHANGE UP
EGFR: 66 ML/MIN/1.73M2 — SIGNIFICANT CHANGE UP
EOSINOPHIL # BLD AUTO: 0.29 K/UL — SIGNIFICANT CHANGE UP (ref 0–0.5)
EOSINOPHIL NFR BLD AUTO: 4 % — SIGNIFICANT CHANGE UP (ref 0–6)
GLUCOSE SERPL-MCNC: 103 MG/DL — HIGH (ref 70–99)
GLUCOSE SERPL-MCNC: 106 MG/DL — HIGH (ref 70–99)
HCT VFR BLD CALC: 41.2 % — SIGNIFICANT CHANGE UP (ref 39–50)
HGB BLD-MCNC: 13.5 G/DL — SIGNIFICANT CHANGE UP (ref 13–17)
IMM GRANULOCYTES NFR BLD AUTO: 0.1 % — SIGNIFICANT CHANGE UP (ref 0–0.9)
INR BLD: 1.05 — SIGNIFICANT CHANGE UP (ref 0.88–1.16)
LACTATE SERPL-SCNC: 0.9 MMOL/L — SIGNIFICANT CHANGE UP (ref 0.5–2)
LYMPHOCYTES # BLD AUTO: 1.2 K/UL — SIGNIFICANT CHANGE UP (ref 1–3.3)
LYMPHOCYTES # BLD AUTO: 16.5 % — SIGNIFICANT CHANGE UP (ref 13–44)
MAGNESIUM SERPL-MCNC: 2 MG/DL — SIGNIFICANT CHANGE UP (ref 1.6–2.6)
MCHC RBC-ENTMCNC: 31.8 PG — SIGNIFICANT CHANGE UP (ref 27–34)
MCHC RBC-ENTMCNC: 32.8 GM/DL — SIGNIFICANT CHANGE UP (ref 32–36)
MCV RBC AUTO: 97.2 FL — SIGNIFICANT CHANGE UP (ref 80–100)
MONOCYTES # BLD AUTO: 0.79 K/UL — SIGNIFICANT CHANGE UP (ref 0–0.9)
MONOCYTES NFR BLD AUTO: 10.9 % — SIGNIFICANT CHANGE UP (ref 2–14)
NEUTROPHILS # BLD AUTO: 4.92 K/UL — SIGNIFICANT CHANGE UP (ref 1.8–7.4)
NEUTROPHILS NFR BLD AUTO: 67.8 % — SIGNIFICANT CHANGE UP (ref 43–77)
NRBC # BLD: 0 /100 WBCS — SIGNIFICANT CHANGE UP (ref 0–0)
PHOSPHATE SERPL-MCNC: 4.5 MG/DL — SIGNIFICANT CHANGE UP (ref 2.5–4.5)
PLATELET # BLD AUTO: 213 K/UL — SIGNIFICANT CHANGE UP (ref 150–400)
POTASSIUM SERPL-MCNC: 4.3 MMOL/L — SIGNIFICANT CHANGE UP (ref 3.5–5.3)
POTASSIUM SERPL-MCNC: SIGNIFICANT CHANGE UP (ref 3.5–5.3)
POTASSIUM SERPL-SCNC: 4.3 MMOL/L — SIGNIFICANT CHANGE UP (ref 3.5–5.3)
POTASSIUM SERPL-SCNC: SIGNIFICANT CHANGE UP (ref 3.5–5.3)
PROT SERPL-MCNC: 7.2 G/DL — SIGNIFICANT CHANGE UP (ref 6–8.3)
PROTHROM AB SERPL-ACNC: 12.5 SEC — SIGNIFICANT CHANGE UP (ref 10.5–13.4)
RBC # BLD: 4.24 M/UL — SIGNIFICANT CHANGE UP (ref 4.2–5.8)
RBC # FLD: 13.6 % — SIGNIFICANT CHANGE UP (ref 10.3–14.5)
SODIUM SERPL-SCNC: 131 MMOL/L — LOW (ref 135–145)
SODIUM SERPL-SCNC: 135 MMOL/L — SIGNIFICANT CHANGE UP (ref 135–145)
WBC # BLD: 7.26 K/UL — SIGNIFICANT CHANGE UP (ref 3.8–10.5)
WBC # FLD AUTO: 7.26 K/UL — SIGNIFICANT CHANGE UP (ref 3.8–10.5)

## 2022-10-19 PROCEDURE — 92610 EVALUATE SWALLOWING FUNCTION: CPT

## 2022-10-19 PROCEDURE — C8929: CPT

## 2022-10-19 PROCEDURE — 83735 ASSAY OF MAGNESIUM: CPT

## 2022-10-19 PROCEDURE — C1757: CPT

## 2022-10-19 PROCEDURE — 93005 ELECTROCARDIOGRAM TRACING: CPT

## 2022-10-19 PROCEDURE — U0005: CPT

## 2022-10-19 PROCEDURE — 97161 PT EVAL LOW COMPLEX 20 MIN: CPT

## 2022-10-19 PROCEDURE — 84132 ASSAY OF SERUM POTASSIUM: CPT

## 2022-10-19 PROCEDURE — 80048 BASIC METABOLIC PNL TOTAL CA: CPT

## 2022-10-19 PROCEDURE — 83036 HEMOGLOBIN GLYCOSYLATED A1C: CPT

## 2022-10-19 PROCEDURE — 94150 VITAL CAPACITY TEST: CPT

## 2022-10-19 PROCEDURE — C1874: CPT

## 2022-10-19 PROCEDURE — 84484 ASSAY OF TROPONIN QUANT: CPT

## 2022-10-19 PROCEDURE — 70498 CT ANGIOGRAPHY NECK: CPT

## 2022-10-19 PROCEDURE — 97535 SELF CARE MNGMENT TRAINING: CPT

## 2022-10-19 PROCEDURE — 76882 US LMTD JT/FCL EVL NVASC XTR: CPT

## 2022-10-19 PROCEDURE — 71045 X-RAY EXAM CHEST 1 VIEW: CPT

## 2022-10-19 PROCEDURE — 99232 SBSQ HOSP IP/OBS MODERATE 35: CPT | Mod: GC

## 2022-10-19 PROCEDURE — 85027 COMPLETE CBC AUTOMATED: CPT

## 2022-10-19 PROCEDURE — 93306 TTE W/DOPPLER COMPLETE: CPT | Mod: 26

## 2022-10-19 PROCEDURE — 84295 ASSAY OF SERUM SODIUM: CPT

## 2022-10-19 PROCEDURE — 83880 ASSAY OF NATRIURETIC PEPTIDE: CPT

## 2022-10-19 PROCEDURE — 80053 COMPREHEN METABOLIC PANEL: CPT

## 2022-10-19 PROCEDURE — 82330 ASSAY OF CALCIUM: CPT

## 2022-10-19 PROCEDURE — 93926 LOWER EXTREMITY STUDY: CPT

## 2022-10-19 PROCEDURE — 71045 X-RAY EXAM CHEST 1 VIEW: CPT | Mod: 26

## 2022-10-19 PROCEDURE — 84100 ASSAY OF PHOSPHORUS: CPT

## 2022-10-19 PROCEDURE — C1894: CPT

## 2022-10-19 PROCEDURE — 85025 COMPLETE CBC W/AUTO DIFF WBC: CPT

## 2022-10-19 PROCEDURE — 93880 EXTRACRANIAL BILAT STUDY: CPT

## 2022-10-19 PROCEDURE — 0042T: CPT

## 2022-10-19 PROCEDURE — C1889: CPT

## 2022-10-19 PROCEDURE — 82962 GLUCOSE BLOOD TEST: CPT

## 2022-10-19 PROCEDURE — 97116 GAIT TRAINING THERAPY: CPT

## 2022-10-19 PROCEDURE — 85730 THROMBOPLASTIN TIME PARTIAL: CPT

## 2022-10-19 PROCEDURE — P9045: CPT

## 2022-10-19 PROCEDURE — 92523 SPEECH SOUND LANG COMPREHEN: CPT

## 2022-10-19 PROCEDURE — 97162 PT EVAL MOD COMPLEX 30 MIN: CPT

## 2022-10-19 PROCEDURE — 85347 COAGULATION TIME ACTIVATED: CPT

## 2022-10-19 PROCEDURE — U0003: CPT

## 2022-10-19 PROCEDURE — 85576 BLOOD PLATELET AGGREGATION: CPT

## 2022-10-19 PROCEDURE — 81001 URINALYSIS AUTO W/SCOPE: CPT

## 2022-10-19 PROCEDURE — 70450 CT HEAD/BRAIN W/O DYE: CPT

## 2022-10-19 PROCEDURE — 84443 ASSAY THYROID STIM HORMONE: CPT

## 2022-10-19 PROCEDURE — A9577: CPT

## 2022-10-19 PROCEDURE — 86900 BLOOD TYPING SEROLOGIC ABO: CPT

## 2022-10-19 PROCEDURE — 85610 PROTHROMBIN TIME: CPT

## 2022-10-19 PROCEDURE — 86803 HEPATITIS C AB TEST: CPT

## 2022-10-19 PROCEDURE — 93308 TTE F-UP OR LMTD: CPT

## 2022-10-19 PROCEDURE — 82803 BLOOD GASES ANY COMBINATION: CPT

## 2022-10-19 PROCEDURE — C1760: CPT

## 2022-10-19 PROCEDURE — 70496 CT ANGIOGRAPHY HEAD: CPT

## 2022-10-19 PROCEDURE — 83605 ASSAY OF LACTIC ACID: CPT

## 2022-10-19 PROCEDURE — 75561 CARDIAC MRI FOR MORPH W/DYE: CPT

## 2022-10-19 PROCEDURE — 36415 COLL VENOUS BLD VENIPUNCTURE: CPT

## 2022-10-19 PROCEDURE — 99239 HOSP IP/OBS DSCHRG MGMT >30: CPT

## 2022-10-19 PROCEDURE — 86901 BLOOD TYPING SEROLOGIC RH(D): CPT

## 2022-10-19 PROCEDURE — C1769: CPT

## 2022-10-19 PROCEDURE — 92507 TX SP LANG VOICE COMM INDIV: CPT

## 2022-10-19 PROCEDURE — 71046 X-RAY EXAM CHEST 2 VIEWS: CPT

## 2022-10-19 PROCEDURE — 86850 RBC ANTIBODY SCREEN: CPT

## 2022-10-19 PROCEDURE — 97530 THERAPEUTIC ACTIVITIES: CPT

## 2022-10-19 PROCEDURE — C8924: CPT

## 2022-10-19 PROCEDURE — C1887: CPT

## 2022-10-19 PROCEDURE — 82533 TOTAL CORTISOL: CPT

## 2022-10-19 PROCEDURE — C1725: CPT

## 2022-10-19 PROCEDURE — 80061 LIPID PANEL: CPT

## 2022-10-19 PROCEDURE — 93010 ELECTROCARDIOGRAM REPORT: CPT

## 2022-10-19 RX ORDER — ASPIRIN/CALCIUM CARB/MAGNESIUM 324 MG
1 TABLET ORAL
Qty: 30 | Refills: 3
Start: 2022-10-19 | End: 2023-02-15

## 2022-10-19 RX ORDER — ATORVASTATIN CALCIUM 80 MG/1
1 TABLET, FILM COATED ORAL
Qty: 30 | Refills: 3
Start: 2022-10-19 | End: 2023-02-15

## 2022-10-19 RX ORDER — SACUBITRIL AND VALSARTAN 24; 26 MG/1; MG/1
1 TABLET, FILM COATED ORAL
Qty: 60 | Refills: 2
Start: 2022-10-19 | End: 2023-01-16

## 2022-10-19 RX ORDER — SACUBITRIL AND VALSARTAN 24; 26 MG/1; MG/1
1 TABLET, FILM COATED ORAL
Qty: 60 | Refills: 0
Start: 2022-10-19 | End: 2022-11-17

## 2022-10-19 RX ORDER — CLOPIDOGREL BISULFATE 75 MG/1
1 TABLET, FILM COATED ORAL
Qty: 30 | Refills: 2
Start: 2022-10-19 | End: 2023-01-16

## 2022-10-19 RX ORDER — ATORVASTATIN CALCIUM 80 MG/1
1 TABLET, FILM COATED ORAL
Qty: 30 | Refills: 1
Start: 2022-10-19 | End: 2022-12-17

## 2022-10-19 RX ORDER — SACUBITRIL AND VALSARTAN 24; 26 MG/1; MG/1
1 TABLET, FILM COATED ORAL
Qty: 60 | Refills: 3
Start: 2022-10-19 | End: 2023-02-15

## 2022-10-19 RX ORDER — ATORVASTATIN CALCIUM 80 MG/1
80 TABLET, FILM COATED ORAL AT BEDTIME
Refills: 0 | Status: DISCONTINUED | OUTPATIENT
Start: 2022-10-19 | End: 2022-10-19

## 2022-10-19 RX ORDER — METOPROLOL TARTRATE 50 MG
0.5 TABLET ORAL
Qty: 15 | Refills: 3
Start: 2022-10-19 | End: 2023-02-15

## 2022-10-19 RX ORDER — SACUBITRIL AND VALSARTAN 24; 26 MG/1; MG/1
1 TABLET, FILM COATED ORAL EVERY 12 HOURS
Refills: 0 | Status: DISCONTINUED | OUTPATIENT
Start: 2022-10-19 | End: 2022-10-19

## 2022-10-19 RX ORDER — METOPROLOL TARTRATE 50 MG
12.5 TABLET ORAL EVERY 24 HOURS
Refills: 0 | Status: DISCONTINUED | OUTPATIENT
Start: 2022-10-19 | End: 2022-10-19

## 2022-10-19 RX ORDER — ASPIRIN/CALCIUM CARB/MAGNESIUM 324 MG
1 TABLET ORAL
Qty: 30 | Refills: 2
Start: 2022-10-19 | End: 2023-01-16

## 2022-10-19 RX ORDER — METOPROLOL TARTRATE 50 MG
0.5 TABLET ORAL
Qty: 15 | Refills: 0
Start: 2022-10-19 | End: 2022-11-17

## 2022-10-19 RX ORDER — CHLORHEXIDINE GLUCONATE 213 G/1000ML
1 SOLUTION TOPICAL DAILY
Refills: 0 | Status: DISCONTINUED | OUTPATIENT
Start: 2022-10-19 | End: 2022-10-19

## 2022-10-19 RX ORDER — CLOPIDOGREL BISULFATE 75 MG/1
1 TABLET, FILM COATED ORAL
Qty: 30 | Refills: 3
Start: 2022-10-19 | End: 2023-02-15

## 2022-10-19 RX ADMIN — Medication 12.5 MILLIGRAM(S): at 11:56

## 2022-10-19 RX ADMIN — SACUBITRIL AND VALSARTAN 1 TABLET(S): 24; 26 TABLET, FILM COATED ORAL at 11:56

## 2022-10-19 RX ADMIN — CLOPIDOGREL BISULFATE 75 MILLIGRAM(S): 75 TABLET, FILM COATED ORAL at 11:56

## 2022-10-19 RX ADMIN — CHLORHEXIDINE GLUCONATE 1 APPLICATION(S): 213 SOLUTION TOPICAL at 11:55

## 2022-10-19 RX ADMIN — SODIUM CHLORIDE 3 MILLILITER(S): 9 INJECTION INTRAMUSCULAR; INTRAVENOUS; SUBCUTANEOUS at 06:33

## 2022-10-19 RX ADMIN — Medication 81 MILLIGRAM(S): at 11:56

## 2022-10-19 RX ADMIN — PANTOPRAZOLE SODIUM 40 MILLIGRAM(S): 20 TABLET, DELAYED RELEASE ORAL at 07:07

## 2022-10-19 NOTE — DISCHARGE NOTE PROVIDER - NSDCFUADDAPPT_GEN_ALL_CORE_FT
Please follow up with cardiologist Dr. Tadeo Magallanes at your scheduled follow up appointment for further evaluation and management.    Please follow up with neurologist for further evaluation and management of your recent stroke. Please follow up with cardiologist Dr. Tadeo Magallanes at your scheduled follow up appointment for further evaluation and management. The office will call you for an earlier appointment when one becomes available.     Please follow up with neurologist for further evaluation and management of your recent stroke. Please follow up with cardiologist Dr. Tadeo Magallanes at your scheduled follow up appointment for further evaluation and management. The office will call you for an earlier appointment when one becomes available.     Please follow up with neurologist Dr Bre Moore for further evaluation and management of your recent stroke. Please follow up with cardiologist Dr. Tadeo Magallanes at your scheduled follow up appointment for further evaluation and management. The office will call you for an earlier appointment when one becomes available.     Please call neurologist Dr Bre Moore to schedule an appointment for further evaluation and management of your recent stroke. Please follow up with cardiologist Dr. Tadeo Magallanes at your scheduled follow up appointment for further evaluation and management. The office will call you for an earlier appointment when one becomes available.     You will get a call from neurology office to schedule follow up appt. If you do not receive a call within a week, please call [phone]

## 2022-10-19 NOTE — DISCHARGE NOTE NURSING/CASE MANAGEMENT/SOCIAL WORK - PATIENT PORTAL LINK FT
You can access the FollowMyHealth Patient Portal offered by [facility] by registering at the following website: [email or web address]. By joining [employer]’s FollowMyHealth portal, you will also be able to view your health information using other applications (apps) compatible with our system.

## 2022-10-19 NOTE — DISCHARGE NOTE NURSING/CASE MANAGEMENT/SOCIAL WORK - NSDCPEWEB_GEN_ALL_CORE
[facility] for Tobacco Control website --- [email or web address] website --- [email or web address]

## 2022-10-19 NOTE — PROGRESS NOTE ADULT - SUBJECTIVE AND OBJECTIVE BOX
INTERVAL EVENTS:  sp LHC/RHC      MEDICATIONS  (STANDING):  aspirin enteric coated 81 milliGRAM(s) Oral daily  atorvastatin 80 milliGRAM(s) Oral at bedtime  chlorhexidine 2% Cloths 1 Application(s) Topical daily  clopidogrel Tablet 75 milliGRAM(s) Oral daily  metoprolol succinate ER 12.5 milliGRAM(s) Oral every 24 hours  pantoprazole    Tablet 40 milliGRAM(s) Oral before breakfast  sacubitril 24 mG/valsartan 26 mG 1 Tablet(s) Oral every 12 hours  senna 2 Tablet(s) Oral at bedtime  sodium chloride 0.9% lock flush 3 milliLiter(s) IV Push every 8 hours  sodium chloride 0.9%. 500 milliLiter(s) (50 mL/Hr) IV Continuous <Continuous>    MEDICATIONS  (PRN):  acetaminophen     Tablet .. 650 milliGRAM(s) Oral every 6 hours PRN Temp greater or equal to 38C (100.4F), Mild Pain (1 - 3)      Home Medications:      Vital Signs Last 24 Hrs  T(C): 37.4 (19 Oct 2022 09:00), Max: 37.6 (19 Oct 2022 04:38)  T(F): 99.4 (19 Oct 2022 09:00), Max: 99.6 (19 Oct 2022 04:38)  HR: 72 (19 Oct 2022 10:00) (51 - 79)  BP: 132/65 (19 Oct 2022 10:00) (99/58 - 144/90)  BP(mean): 92 (19 Oct 2022 10:00) (72 - 108)  RR: 23 (19 Oct 2022 10:00) (14 - 28)  SpO2: 99% (19 Oct 2022 10:00) (95% - 100%)    Parameters below as of 19 Oct 2022 08:00  Patient On (Oxygen Delivery Method): room air         PHYSICAL EXAM:  GEN: Awake, alert. NAD.   HEENT: NCAT, PERRL, EOMI. Mucosa moist. No JVD.  RESP: CTA b/l  CV: RRR. Normal S1/S2. No m/r/g.  ABD: Soft. NT/ND. BS+  EXT: Warm. No edema, clubbing, or cyanosis.   NEURO: AAOx3. No focal deficits.     LABS:                        13.5   7.26  )-----------( 213      ( 19 Oct 2022 05:30 )             41.2     10-19    135  |  101  |  14  ----------------------------<  106<H>  4.3   |  23  |  1.18    Ca    9.3      19 Oct 2022 06:57  Phos  4.5     10-19  Mg     2.0     10-19    TPro  7.2  /  Alb  3.6  /  TBili  1.2  /  DBili  x   /  AST  42<H>  /  ALT  47<H>  /  AlkPhos  70  10-19        PT/INR - ( 19 Oct 2022 06:57 )   PT: 12.5 sec;   INR: 1.05          PTT - ( 19 Oct 2022 06:57 )  PTT:32.9 sec    I&O's Summary    18 Oct 2022 07:01  -  19 Oct 2022 07:00  --------------------------------------------------------  IN: 280 mL / OUT: 600 mL / NET: -320 mL    19 Oct 2022 07:01  -  19 Oct 2022 11:31  --------------------------------------------------------  IN: 150 mL / OUT: 0 mL / NET: 150 mL      A/P:    72M nicotine dependence no known PMH (doesn’t follow doctors) presented on 10/7 with intermittent CP x5d, found with NSTEMI 3V CAD, course c/b syncopal episode (found on floor with R forehead lac, aphasia, R weakness) while awaiting cabg, found w/ L M1 CVA on 10/9 s/p mech aspiration thrombectomy c/b R groin hematoma. Briefly on aleena for BP support in ludwin-infarct period of CVA. Had been on dopamine for HR support (HR range 30s-50s), asymptomatic.    #HFrEF/ICM:  - GDMT: agree with toprol 12.5 qd and entresto 24/26 bid  - Volume: euvolemic, off diuretics  - Etiology: ICM  - Device: not candidate at this point    d/w HF attending  Job Reynoso MD PGY6

## 2022-10-19 NOTE — PROGRESS NOTE ADULT - REASON FOR ADMISSION
Coronary Artery Disease
3vCAD, CVA
Coronary Artery Disease

## 2022-10-19 NOTE — DISCHARGE NOTE PROVIDER - NSDCFUSCHEDAPPT_GEN_ALL_CORE_FT
Tadeo Magallanes  [facility]  HEARTVASC [address]  Scheduled Appointment: 12/06/2022

## 2022-10-19 NOTE — DISCHARGE NOTE PROVIDER - NSDCMRMEDTOKEN_GEN_ALL_CORE_FT
Entresto 24 mg-26 mg oral tablet: 1 tab(s) orally 2 times a day    aspirin 81 mg oral delayed release tablet: 1 tab(s) orally once a day  atorvastatin 80 mg oral tablet: 1 tab(s) orally once a day (at bedtime)  clopidogrel 75 mg oral tablet: 1 tab(s) orally once a day  Entresto 24 mg-26 mg oral tablet: 1 tab(s) orally 2 times a day   Metoprolol Succinate ER 25 mg oral tablet, extended release: Take 1/2 tab orally once a day    aspirin 81 mg oral delayed release tablet: 1 tab(s) orally once a day  atorvastatin 80 mg oral tablet: 1 tab(s) orally once a day (at bedtime)  clopidogrel 75 mg oral tablet: 1 tab(s) orally once a day  clopidogrel 75 mg oral tablet: 1 tab(s) orally once a day  Entresto 24 mg-26 mg oral tablet: 1 tab(s) orally 2 times a day   Entresto 24 mg-26 mg oral tablet: 1 tab(s) orally 2 times a day   Metoprolol Succinate ER 25 mg oral tablet, extended release: Take 1/2 tab orally once a day

## 2022-10-19 NOTE — DISCHARGE NOTE NURSING/CASE MANAGEMENT/SOCIAL WORK - NSDCFUADDAPPT_GEN_ALL_CORE_FT
Please follow up with cardiologist Dr. Tadeo Magallanes at your scheduled follow up appointment for further evaluation and management. The office will call you for an earlier appointment when one becomes available.     Please call neurologist Dr Bre Moore to schedule an appointment for further evaluation and management of your recent stroke.

## 2022-10-19 NOTE — DISCHARGE NOTE PROVIDER - NSDCCPCAREPLAN_GEN_ALL_CORE_FT
PRINCIPAL DISCHARGE DIAGNOSIS  Diagnosis: NSTEMI (non-ST elevation myocardial infarction)  Assessment and Plan of Treatment: You presented to [facility]       PRINCIPAL DISCHARGE DIAGNOSIS  Diagnosis: NSTEMI (non-ST elevation myocardial infarction)  Assessment and Plan of Treatment: You presented to [facility] with a heart attack. A heart attack occurs when blood and oxygen supply to the heart is cut off. A heart attack can cause damage to the heart that cannot be fixed. A heart attack is also called a myocardial infarction, or MI.    What are the causes?  The heart with a close-up of a normal artery and an artery with plaque buildup.   This condition may be caused by:  •A fatty substance (plaque) in the blood vessels (arteries). This can block the flow of blood to the heart.  •A blood clot in the blood vessels that go to the heart. The blood clot blocks blood flow.  •An abnormal heartbeat.  •Some diseases, such as problems in red blood cells (anemia)orproblems in breathing (respiratory failure).  •Tightening (spasm) of a blood vessel that cuts off blood to the heart.  •A tear in a blood vessel of the heart.  You were treated with medications to prevent clotting and then you had 2 stents placed in the vessels of your heart to ensure your heart receives the blood supply it needs to continue pumping.   Summary  •A heart attack occurs when blood and oxygen supply to the heart is cut off.  • Do not take NSAIDs unless your doctor says it is okay.  • Do not smoke. Avoid secondhand smoke.  •Exercise regularly. Ask your doctor about a cardiac rehab program.  Please make sure to take all of your new medications: plavix, aspirin, atorvastatin, entresto, and toprol. These will help your heart recover and continue working properly following your heart attack. It is also important to follow up with your cardiologist as scheduled so they may continue to monitor your condition.      SECONDARY DISCHARGE DIAGNOSES  Diagnosis: Acute ischemic stroke  Assessment and Plan of Treatment: An ischemic stroke occurs when blood flow to part of your brain is blocked. The block is usually caused by a blood clot that gets stuck in a narrow blood vessel. When oxygen cannot get to an area of the brain, tissue in that area may get damaged. The damage can cause loss of body functions controlled by that area of the brain. A stroke is a medical emergency that needs immediate treatment. Most medicines and treatments work best the sooner they are given.     DISCHARGE INSTRUCTIONS:  Call your local emergency number (427 in the [place]) or have someone else call if:   •You have any of the following signs of a stroke: ?Numbness or drooping on one side of your face   ?Weakness in an arm or leg  ?Confusion or difficulty speaking  ?Dizziness, a severe headache, or vision loss  Seek care immediately if:   •Your arm or leg feels warm, tender, and painful. It may look swollen and red.  •You have loss of balance or coordination.  •You have double vision or vision loss.  •You have unusual or heavy bleeding.  Call your doctor if:   •Your blood pressure is higher or lower than you were told it should be.  •You have questions or concerns about your condition or care.       PRINCIPAL DISCHARGE DIAGNOSIS  Diagnosis: NSTEMI (non-ST elevation myocardial infarction)  Assessment and Plan of Treatment: You presented to [facility] with a heart attack. A heart attack occurs when blood and oxygen supply to the heart is cut off. A heart attack can cause damage to the heart that cannot be fixed. A heart attack is also called a myocardial infarction, or MI.    What are the causes?  The heart with a close-up of a normal artery and an artery with plaque buildup.   This condition may be caused by:  •A fatty substance (plaque) in the blood vessels (arteries). This can block the flow of blood to the heart.  •A blood clot in the blood vessels that go to the heart. The blood clot blocks blood flow.  •An abnormal heartbeat.  •Some diseases, such as problems in red blood cells (anemia)orproblems in breathing (respiratory failure).  •Tightening (spasm) of a blood vessel that cuts off blood to the heart.  •A tear in a blood vessel of the heart.  You were treated with medications to prevent clotting and then you had 2 stents placed in the vessels of your heart to ensure your heart receives the blood supply it needs to continue pumping.   Summary  •A heart attack occurs when blood and oxygen supply to the heart is cut off.  • Do not take NSAIDs unless your doctor says it is okay.  • Do not smoke. Avoid secondhand smoke.  •Exercise regularly. Ask your doctor about a cardiac rehab program.  Please make sure to take all of your new medications: plavix, aspirin, atorvastatin, entresto, and toprol. These will help your heart recover and continue working properly following your heart attack. It is also important to follow up with your cardiologist as scheduled so they may continue to monitor your condition.      SECONDARY DISCHARGE DIAGNOSES  Diagnosis: Acute ischemic stroke  Assessment and Plan of Treatment: An ischemic stroke occurs when blood flow to part of your brain is blocked. The block is usually caused by a blood clot that gets stuck in a narrow blood vessel. When oxygen cannot get to an area of the brain, tissue in that area may get damaged. The damage can cause loss of body functions controlled by that area of the brain. A stroke is a medical emergency that needs immediate treatment. Most medicines and treatments work best the sooner they are given.  You were treated for your stroke with medications to prevent clots and you also underwent a procedure called a thrombectomy which removed the clot that was disrupting blood flow to a part of your brain. This restored blood flow to that portion of the brain.      DISCHARGE INSTRUCTIONS:  Call your local emergency number (911 in the [place]) or have someone else call if:   •You have any of the following signs of a stroke:  -Numbness or drooping on one side of your face   -Weakness in an arm or leg  -Confusion or difficulty speaking  -Dizziness, a severe headache, or vision loss  Seek care immediately if:   •Your arm or leg feels warm, tender, and painful. It may look swollen and red.  •You have loss of balance or coordination.  •You have double vision or vision loss.  •You have unusual or heavy bleeding.  Call your doctor if:   •Your blood pressure is higher or lower than you were told it should be.  •You have questions or concerns about your condition or care.

## 2022-10-19 NOTE — PROGRESS NOTE ADULT - ATTENDING SUPERVISION STATEMENT
hepatic encephalopathy  r/o sbp  h/o JIMÉNEZ  cad  hyperlipidemia  afib  thrombocytopenia  coagulopathy      stable for dc  fu inr
Fellow
Resident

## 2022-10-19 NOTE — DISCHARGE NOTE PROVIDER - PROVIDER TOKENS
PROVIDER:[TOKEN:[[id number]]] FREE:[LAST:[Elpidio],FIRST:[Tadeo],PHONE:[phone]],FAX:[(   )    -],ADDRESS:[[address] [place] [address] [address] [place] [address]],SCHEDULEDAPPT:[12/06/2022],SCHEDULEDAPPTTIME:[09:45 AM]] FREE:[LAST:[Elpidio],FIRST:[Tadeo],PHONE:[phone]],FAX:[(   )    -],ADDRESS:[[address] [place] [address] [address] [place] [address]],SCHEDULEDAPPT:[12/06/2022],SCHEDULEDAPPTTIME:[09:45 AM]],PROVIDER:[TOKEN:[[id number]]]

## 2022-10-19 NOTE — DISCHARGE NOTE PROVIDER - NSDCQMACEA_CARD_A_CORE
Operative Note    Truman Esquivel  3/7/2018    Pre-op Diagnosis:   Squamous cell carcinoma of larynx [C32.9]  Abnormal Imaging of Colon    Post-op Diagnosis:     Post-Op Diagnosis Codes:     * Squamous cell carcinoma of larynx [C32.9]  Abnormal Imaging of Colon    Procedure/CPT® Codes:      Procedure(s):  INSERTION OF MEDIPORT  COLONOSCOPY with biopsy of Rectum    Surgeon(s):  Kris Solano MD    Anesthesia: General    Staff:   Circulator: Joshua Ordonez RN  Radiology Technologist: Ernestine Vargas  Scrub Person: Alka Salinas  Assistant: Norma Lamb CSA  Endo Nurse: Arti Morfin RN    Estimated Blood Loss: minimal    Specimens:                ID Type Source Tests Collected by Time   A : RECTUM Tissue Large Intestine, Rectum TISSUE PATHOLOGY EXAM Kris Solano MD 3/7/2018 0751         Drains:      Findings: Rectal inflammation seen on colonoscopy, biopsy    Complications: None    Indication: Laryngeal cancer with abnormality seen in the rectum on PET CT    Operative Note:    The patient was seen and consented preoperatively.  Following this he is brought to the operating room and placed in supine position on the OR table.  Preoperative briefing and timeout were performed.  His pre-existing tracheostomy was then connected to the ventilatory circuit I anesthesia.  IV sedation was administered.  The patient was then positioned in the left side down lateral position.    Colonoscopy was performed from the anus to the cecum.  Photo documentation of the cecum was obtained.  The colonoscope was then carefully withdrawn examining the mucosa of the colon during withdrawal.  No polyps were seen.  There is a small area of inflamed appearing mucosa within the rectum and this was biopsied.  No other abnormalities of the colon were seen on direct or retroflexed views.  Patient was then repositioned in the supine position on the OR table.    Timeout was repeated.  The neck and upper chest were  prepped and draped bilaterally.  Attempts were made to access the subclavian vein on the right side and appeared to be unsuccessful.  Attention was then turned to the left side.  The left subclavian vein was accessed with a large gauge needle.  Local anesthetic was injected below the left clavicle.  A skin incision was made adjacent to the site of the wire which been placed through the needle.  A subcutaneous pocket was then fashioned.  The wire was confirmed to be in good position on fluoroscopic imaging.  A dilator and sheath were then placed over the wire under fluoroscopic visualization.  The wire and dilator were withdrawn.  The catheter was then placed through the peel-away sheath.  The catheter appeared to be in good position.  Sheath was then peeled away.  The catheter was retracted until it was at the cavoatrial junction.  Catheter was then trimmed and attached to the port appliance.  The port was then secured to the pectoral fascia using Prolene sutures.  The port was then accessed and aspirated blood without difficulty and was flushed with heparinized saline.  The skin was then closed in layers using 3-0 and 4-0 Vicryl.  Skin affix was placed over the incision site.  Chest x-ray in recovery room confirmed the port to be in good position with no evidence of pneumothorax.          This document has been electronically signed by Kris Solano MD on March 10, 2018 10:37 AM        Kris Solano MD     Date: 3/10/2018  Time: 10:32 AM       Yes

## 2022-10-19 NOTE — PROGRESS NOTE ADULT - PROVIDER SPECIALTY LIST ADULT
CT Surgery
Critical Care
Electrophysiology
Heart Failure
Heart Failure
Intervent Cardiology
CCU
CCU
CT Surgery
Cardiology
Critical Care
Heart Failure
Neurology
Neurosurgery
CT Surgery
CT Surgery
Critical Care
Heart Failure
Heart Failure
Neurology
Neurology
Intervent Cardiology

## 2022-10-19 NOTE — PROGRESS NOTE ADULT - ATTENDING COMMENTS
72/M chronic smoker not seen a doctor in many years admitted with CP and found to have NSTEMI. C with 3 vesseal CAD(90% mid LAD, 95% Prox CX, 85% RCA disease) and was planned for CABG. TTE with EF 15-20%, LVEDD 5.8cms, Normal RV, Mod MR, NO LV thrombus.   Pt suffered CVA and had a fall with laceration to scalp. Pt had thrombectomy of MCA with resolution of the weakness.     Pt denies any symptoms  with walking, walked 3x yesterday.    exam: euvolemic, JVP ~8cms  no edema  s1s2+ RRR  b/l clear BS  Abdomen soft  no LE edema    Plan:  Cont antiplatelets and statin  off phenyl since this AM  on midodrine 20q8, will wean as tolerated  s/p volume with ALbumin and IVF  will not administed anymore volume.   Plan for revascularization -->  high risk PCI next week   Cardiac MRI pending    discussed with Dr. Gandhi  d/w CTICU team    will cont follow and optimize GDMT as tolerated.
73 YO M with a history of tobacco use and no routine medical care who initially presented to the ED with chest pain and found to have NSTEMI with TTE revealing severe LV dysfunction with regional WMAs and LHC showing severe 3v CAD. He was transferred to [facility] and CABG was scheduled but he was found down in his room with CTA showing L MCA thrombus and he underwent urgent thrombectomy. He is no longer a surgical candidate. cMRI revealed partial viability, particularly in LAD territory and is he is now s/p PCI to the LAD and LCx with good angiographic result. His hemodynamics at time of cath were optimal. He was on midodrine but now normotensive/hypertensive.     RHC 10/19: RA 1, PA 24/5, PCWP 5, Camila CO/CI 5.3/2.8, MAP 75 with SVR 1100    # Acute systolic heart failure  - GDMT: start entresto 24-26 mg BID and metoprolol succinate 12.5 mg daily. eventual MRA/SGLT2i  - Diuretics: euvolemic off diuretics, encouraged increased hydration  - Device: needs reassessment of LVEF after PCI/GDMT    # NSTEMI  - On DAPT and high potency statin      # Sinus bradycardia  - Improved, cautious introduction of BB as above     # Acute CVA s/p thrombectomy  - Improving deficits    Patient being discharge today, will follow up with Dr. Gandhi next week and should subsequently follow with Dr. Magallanes in [facility]
73 YO M with a history of tobacco use and no routine medical care who initially presented to the ED with chest pain and found to have NSTEMI with TTE revealing severe LV dysfunction with regional WMAs and LHC showing severe 3v CAD. He was transferred to [facility] and CABG was scheduled but he was found down in his room with CTA showing L MCA thrombus and he underwent urgent thrombectomy. He is no longer a surgical candidate and tentative plan is for high risk PCI.    He is euvolemic on exam. He is normotensive but on midodrine which we will try to wean before starting GDMT. Typically midodrine requirements in setting of systolic dysfunction is concerning but he appears warm/perfused, is asymptomatic, and has normal end organ function    # Acute systolic heart failure  - Wean midodrine by 5 mg TID each day  - GDMT: will initiate after midodrine wean   - Diuretics: euvolemic off diuretics, OK to administer small boluses IVF     # NSTEMI  - On plavix monotherapy and anticoagulation per primary team  - cMRI viability performed, awaiting results      # Sinus bradycardia   - EP consulted, being considered for device if continues post PCI    # Acute CVA s/p thrombectomy  - Improving deficits
73 YO M with a history of tobacco use and no routine medical care who initially presented to the ED with chest pain and found to have NSTEMI with TTE revealing severe LV dysfunction with regional WMAs and LHC showing severe 3v CAD. He was transferred to [facility] and CABG was scheduled but he was found down in his room with CTA showing L MCA thrombus and he underwent urgent thrombectomy. He is no longer a surgical candidate. cMRI revealed viability in LAD territory (no viablility in LCx and partial viability in RCA territory) and he is scheduled for PCI today, likely to LAD region    He is euvolemic on exam. He is normotensive but on midodrine which we will try to wean before starting GDMT. Typically midodrine requirements in setting of systolic dysfunction is concerning but he appears warm/perfused, is asymptomatic, and has normal end organ function. HIs LVEF also appears better on his MRI     # Acute systolic heart failure  - Midodrine weaned off today  - GDMT: will initiate tomorrow after PCI   - Diuretics: euvolemic off diuretics    # NSTEMI  - On DAPT and high potency statin      # Sinus bradycardia  - EP consulted, being considered for device if continues post PCI    # Acute CVA s/p thrombectomy  - Improving deficits .
72/M chronic smoker not seen a doctor in many years admitted with CP and found to have NSTEMI. C with 3 vesseal CAD(90% mid LAD, 95% Prox CX, 85% RCA disease) and was planned for CABG. TTE with EF 15-20%, LVEDD 5.8cms, Normal RV, Mod MR, NO LV thrombus.   Pt suffered CVA and had a fall with laceration to scalp. Pt had thrombectomy of MCA with resolution of the weakness.     Pt denies any symptoms  with walking    exam: euvolemic, JVP ~8cms  no edema  s1s2+ RRR  b/l clear BS  Abdomen soft  no LE edema    Plan:  Cont antiplatelets and statin  wean  midodrine 15q8, will wean as tolerated  s/p volume with ALbumin and IVF, PRN diuretics    Plan for revascularization -->  high risk PCI next week   Cardiac MRI pending    discussed with Dr. Gandhi    Pt is stable for transfer to [facility]  will cont follow and optimize GDMT as tolerated.
Pt is a 71 y/o c CAD s/p NSTEMI with cath revealing 3v dx and acute systolic CHF (EF 15%) who was initally referred for CABG. While waiting for CABG pt with acute CVA with thrombectomy. Pt then went for impella assisted PCI instead of CABG.    sb @57 c [place], V1-V6  RHC PCWP 5    afeb, HR 55, 109/75, 96% RA    Hgb 13.5  Plt 213    Cr 1.2  AST 42  ALT 47  Kayce 0.3    CXR: WNL    - pt with nstemi s/p PCI to LAD/LCX c Impella  - would increase lipitor to 80mg  - remove impella  - cont dapt  - consider d/c with arb/arni/b-blocker depending on hemodynamics

## 2022-10-19 NOTE — DISCHARGE NOTE NURSING/CASE MANAGEMENT/SOCIAL WORK - NSDCPEFALRISK_GEN_ALL_CORE
For information on Fall & Injury Prevention, visit: [email or web address] OR  [email or web address] OR  https://www.cdc.gov/steadi/patient.html

## 2022-10-19 NOTE — DISCHARGE NOTE NURSING/CASE MANAGEMENT/SOCIAL WORK - NSDCPEEMAIL_GEN_ALL_CORE
[facility] email [email or web address]

## 2022-10-19 NOTE — DISCHARGE NOTE PROVIDER - CARE PROVIDER_API CALL
Tadeo Magallanes)  Cardiology; Internal Medicine  [address] [place] [address]  Phone: [phone]  Fax: [phone]  Follow Up Time:    Tadeo Magallanes  [address] [place] [address]  Entrance B 1st floor  Phone: [phone]  Fax: (   )    -  Scheduled Appointment: 12/06/2022 09:45 AM   Tadeo Magallanes  [address] [place] [id number]  Entrance B 1st floor  Phone: [phone]  Fax: (   )    -  Scheduled Appointment: 12/06/2022 09:45 AM    Bre Moore)  Neurology  [address] [address]  Phone: [phone]  Fax: [phone]  Follow Up Time:

## 2022-10-19 NOTE — DISCHARGE NOTE PROVIDER - HOSPITAL COURSE
72-year-old M, current 1/2 PPD smoker x 30-40 years,  poor medical follow up, no known PMHx originially presented to [facility] on 10/4 c/o recurrent episodes of substernal chest pain (10/3 AM,10/4 PM) radiating to B/L arms, EKG revealed inverted T-waves in the lateral leads. Trop T peaked at 0.34, and patient was started on heparin gtt for NSTEMI. A cardiac cath on 10/7 showed 3vCADs(mLAD 90%, pLCx 95%, mRCA 75%). TTE revealed newly reduced EF 15-20% (Repeated TTE EF 35%). He was initially transferred to [facility] under Dr. Collins for CABG; however ccb acute ischemic MCA stroke (r-sided weakness), s/p cerebral angio w/ thrombectomy with neurosurgery on 10/9/22 (initially expressive aphasia but now neurological exam without deficits). On 10/10 R. groin hematoma was noted, with duplex negative for hematoma and pseudoaneurysm. Hospital course c/b hypotension (started on Midodrine) & bradycardia 20s-40s (now improved to high 40s-50s), patient now weaned off midodrine & HR improved off phenylephrine. On 10/18, patient cleared by Neurology for PCI (>1 week since stroke), and received impella-assisted PCI s/p LAD/LCX. The RCA residual disease is moderate and stable. He was then transferred to CCU for management. Patient remained hemodynamically stable, he showed no residual neurologic deficits.    72-year-old M, current 1/2 PPD smoker x 30-40 years,  poor medical follow up, no known PMHx originially presented to [facility] on 10/4 c/o recurrent episodes of substernal chest pain (10/3 AM,10/4 PM) radiating to B/L arms, EKG revealed inverted T-waves in the lateral leads. Trop T peaked at 0.34, and patient was started on heparin gtt for NSTEMI. A cardiac cath on 10/7 showed 3vCADs(mLAD 90%, pLCx 95%, mRCA 75%). TTE revealed newly reduced EF 15-20% (Repeated TTE EF 35%). He was initially transferred to [facility] under Dr. Collins for CABG; however ccb acute ischemic MCA stroke (r-sided weakness), s/p cerebral angio w/ thrombectomy with neurosurgery on 10/9/22 (initially expressive aphasia but now neurological exam without deficits). On 10/10 R. groin hematoma was noted, with duplex negative for hematoma and pseudoaneurysm. Hospital course c/b hypotension (requiring Midodrine) & bradycardia 20s-40s, patient now weaned off midodrine & HR improved off phenylephrine. On 10/18, patient cleared by Neurology for PCI (>1 week since stroke), and received impella-assisted PCI s/p LAD/LCX. The RCA residual disease is moderate and stable. He was then transferred to CCU for management. Patient remained hemodynamically stable, he showed no residual neurologic deficits. Medically stable for discharge with plan to follow up with heart failure and neurosurgery outpatient.       #Acute ischemic MCA stroke  Found to have acute ischemic MCA stroke s/p cerebral angio w/ thrombectomy with neurosurgery on 10/9/22. Initially with expressive aphasia and RUE/RLE weakness; however, currently with no focal neuro deficits. CT head 10/17: Continued interval evolution of the subacute infarct involving the left subinsular region. No hemorrhage.  - Plavix 75 mg PO QD  - Atorvastatin 80 mg PO QD  - ASA 81mg QD  - Follow up with neurosurgery outpatient Dr Sosa     #NSTEMI    3vCADs(mLAD 90%, pLCx 95%, mRCA 75%)  ( Trop T peaked 0.3). s/p PCI to mLAD and pLCx 10/18).     - Plavix, atorvastatin, and aspirin as above  - May repeat TTE outpatient in 1-2 months to reevaluate EF post PCI     #Bradycardia   Bradycardia 20s-40s during hospital admit, now stable in 50s-60s off midodrine.     Pulm  #GUSTABO    GI  #GUSTABO    #Nutrition  - Start on DASH diet.    #MSK  Stable Hematoma  R groin with large ecchymosis, quarter-sized non-tender hard, non-compressible hematoma with bruit appreciated at site of previous cath access, R DP 1+, R PT 2+  - RLE arterial duplex 10/10: thin layer of hypoechoic free fluid is noted superficial to the R CFA c/w confluent edema; no distinct fluid collection is identified; no hematoma or pseudoaneurysm identified.   -Repeat RLE duplex 10/17: No right groin mass or fluid collection. A few benign-appearing right inguinal lymph nodes visualized in the palpable area of concern.  - Continue to monitor.    #Nicotine dependence.   ·  Plan: 15-20 pack years  - Encourage smoking cessation   72-year-old M, current 1/2 PPD smoker x 30-40 years,  poor medical follow up, no known PMHx originially presented to [facility] on 10/4 c/o recurrent episodes of substernal chest pain (10/3 AM,10/4 PM) radiating to B/L arms, EKG revealed inverted T-waves in the lateral leads. Trop T peaked at 0.34, and patient was started on heparin gtt for NSTEMI. A cardiac cath on 10/7 showed 3vCADs(mLAD 90%, pLCx 95%, mRCA 75%). TTE revealed newly reduced EF 15-20% (Repeated TTE EF 35%). He was initially transferred to [facility] under Dr. Collins for CABG; however ccb acute ischemic MCA stroke (r-sided weakness), s/p cerebral angio w/ thrombectomy with neurosurgery on 10/9/22 (initially expressive aphasia but now neurological exam without deficits). On 10/10 R. groin hematoma was noted, with duplex negative for hematoma and pseudoaneurysm. Hospital course c/b hypotension (requiring Midodrine) & bradycardia 20s-40s, patient now weaned off midodrine & HR improved off phenylephrine. On 10/18, patient cleared by Neurology for PCI (>1 week since stroke), and received impella-assisted PCI s/p LAD/LCX. The RCA residual disease is moderate and stable. He was then transferred to CCU for management. Patient remained hemodynamically stable, he showed no residual neurologic deficits. Medically stable for discharge with plan to follow up with heart failure and neurology outpatient.       #Acute ischemic MCA stroke  Found to have acute ischemic MCA stroke s/p cerebral angio w/ thrombectomy with neurosurgery on 10/9/22. Initially with expressive aphasia and RUE/RLE weakness; however, currently with no focal neuro deficits. CT head 10/17: Continued interval evolution of the subacute infarct involving the left subinsular region. No hemorrhage.  - Plavix 75 mg PO QD  - Atorvastatin 80 mg PO QD  - ASA 81mg QD  - Follow up with neurology outpatient Dr. Bre Moore     #NSTEMI    3vCADs (mLAD 90%, pLCx 95%, mRCA 75%)  ( Trop T peaked 0.3). s/p PCI to mLAD and pLCx 10/18.     - Plavix, atorvastatin, and aspirin as above  - May repeat TTE outpatient in 1-2 months to reevaluate EF post PCI   - Follow up with heart failure Dr Magallanes December 6th 9:45am (or earlier if appointment becomes available)     # HFrEF  EF 35% (10/10/2022).   - Entresto 24-26 po daily  - Toprol 12.5 po daily  - Follow up with Dr. Magallanes outpatient to evaluate for spironolactone and SGLT-2 inhibitor therapy based on NYHA HF class    #Bradycardia  RESOLVED. Bradycardia 20s-40s during hospital admit, now resolved, HR stable in 50s-60s off midodrine.     # Hematoma  R groin with large ecchymosis, quarter-sized non-tender hard, non-compressible hematoma with bruit appreciated at site of previous cath access.   RLE arterial duplex 10/10: no hematoma or pseudoaneurysm identified.   Repeat RLE duplex 10/17: No right groin mass or fluid collection. A few benign-appearing right inguinal lymph nodes visualized in the palpable area of concern.    #Nicotine dependence.   15-20 pack years  - Encourage smoking cessation    New medications: Plavix, atorvastatin, aspirin, entresto, toprol    72-year-old M, current 1/2 PPD smoker x 30-40 years,  poor medical follow up, no known PMHx originially presented to [facility] on 10/4 c/o recurrent episodes of substernal chest pain (10/3 AM,10/4 PM) radiating to B/L arms, EKG revealed inverted T-waves in the lateral leads. Trop T peaked at 0.34, and patient was started on heparin gtt for NSTEMI. A cardiac cath on 10/7 showed 3vCADs(mLAD 90%, pLCx 95%, mRCA 75%). TTE revealed newly reduced EF 15-20% (Repeated TTE EF 35%). He was initially transferred to [facility] under Dr. Collins for CABG; however ccb acute ischemic MCA stroke (r-sided weakness), s/p cerebral angio w/ thrombectomy with neurosurgery on 10/9/22 (initially expressive aphasia but now neurological exam without deficits). On 10/10 R. groin hematoma was noted, with duplex negative for hematoma and pseudoaneurysm. Hospital course c/b hypotension (requiring Midodrine) & bradycardia 20s-40s, patient now weaned off midodrine & HR improved off phenylephrine. On 10/18, patient cleared by Neurology for PCI (>1 week since stroke), and received impella-assisted PCI s/p LAD/LCX. The RCA residual disease is moderate and stable. He was then transferred to CCU for management. Patient remained hemodynamically stable, he showed no residual neurologic deficits. Medically stable for discharge with plan to follow up with heart failure and neurology outpatient.     #Acute ischemic MCA stroke  Found to have acute ischemic MCA stroke s/p cerebral angio w/ thrombectomy with neurosurgery on 10/9/22. Initially with expressive aphasia and RUE/RLE weakness; however, currently with no focal neuro deficits. CT head 10/17: Continued interval evolution of the subacute infarct involving the left subinsular region. No hemorrhage.  - Plavix 75 mg PO QD  - Atorvastatin 80 mg PO QD  - ASA 81mg QD  - Follow up with neurology outpatient Dr. Bre Moore     #NSTEMI    3vCADs (mLAD 90%, pLCx 95%, mRCA 75%)  ( Trop T peaked 0.3). s/p PCI to mLAD and pLCx 10/18.     - Plavix, atorvastatin, and aspirin as above  - May repeat TTE outpatient in 1-2 months to reevaluate EF post PCI   - Follow up with heart failure Dr Magallanes December 6th 9:45am (or earlier if appointment becomes available)     # HFrEF  EF 35% (10/10/2022).   - Entresto 24-26 po daily  - Toprol 12.5 po daily  - Follow up with Dr. Magallanes outpatient to evaluate for spironolactone and SGLT-2 inhibitor therapy based on NYHA HF class    #Bradycardia  RESOLVED. Bradycardia 20s-40s during hospital admit, now resolved, HR stable in 50s-60s off midodrine.     # Hematoma  R groin with large ecchymosis, quarter-sized non-tender hard, non-compressible hematoma with bruit appreciated at site of previous cath access.   RLE arterial duplex 10/10: no hematoma or pseudoaneurysm identified.   Repeat RLE duplex 10/17: No right groin mass or fluid collection. A few benign-appearing right inguinal lymph nodes visualized in the palpable area of concern.    #Nicotine dependence.   15-20 pack years  - Encourage smoking cessation    New medications: Plavix, atorvastatin, aspirin, entresto, toprol     PHYSICAL EXAM:  GENERAL: awake, alert, no acute distress  HEAD:  Atraumatic, Normocephalic  EYES: EOMI, PERRLA, conjunctiva and sclera clear. Xanthomas seen on eyelids.   NECK: Supple, No JVD, Normal thyroid, no enlarged nodes  NERVOUS SYSTEM:  Alert & Awake.   CHEST/LUNG: B/L good air entry, no rales, rhonchi, or wheezing  HEART: S1S2 normal, no S3, Regular rate and rhythm; No murmurs  ABDOMEN: Soft, Nontender, Nondistended; Bowel sounds present. Impella removal site packed with bandage CDI.   EXTREMITIES:  2+ Peripheral Pulses, No clubbing, cyanosis, or edema  LYMPH: No lymphadenopathy noted  SKIN: No rashes or lesions       72-year-old M, current 1/2 PPD smoker x 30-40 years,  poor medical follow up, no known PMHx originially presented to [facility] on 10/4 c/o recurrent episodes of substernal chest pain (10/3 AM,10/4 PM) radiating to B/L arms, EKG revealed inverted T-waves in the lateral leads. Trop T peaked at 0.34, and patient was started on heparin gtt for NSTEMI. A cardiac cath on 10/7 showed 3vCADs(mLAD 90%, pLCx 95%, mRCA 75%). TTE revealed newly reduced EF 15-20% (Repeated TTE EF 35%). He was initially transferred to [facility] under Dr. Collins for CABG; however ccb acute ischemic MCA stroke (r-sided weakness), s/p cerebral angio w/ thrombectomy with neurosurgery on 10/9/22 (initially expressive aphasia but now neurological exam without deficits). On 10/10 R. groin hematoma was noted, with duplex negative for hematoma and pseudoaneurysm. Hospital course c/b hypotension (requiring Midodrine) & bradycardia 20s-40s, patient now weaned off midodrine & HR improved off phenylephrine. On 10/18, patient cleared by Neurology for PCI (>1 week since stroke), and received impella-assisted PCI s/p LAD/LCX. The RCA residual disease is moderate and stable. He was then transferred to CCU for management. Patient remained hemodynamically stable, he showed no residual neurologic deficits. Medically stable for discharge with plan to follow up with heart failure and neurology outpatient.     #Acute ischemic MCA stroke  Found to have acute ischemic MCA stroke s/p cerebral angio w/ thrombectomy with neurosurgery on 10/9/22. Initially with expressive aphasia and RUE/RLE weakness; however, currently with no focal neuro deficits. CT head 10/17: Continued interval evolution of the subacute infarct involving the left subinsular region. No hemorrhage.  - Plavix 75 mg PO QD  - Atorvastatin 80 mg PO QD  - ASA 81mg QD  - Follow up with neurology outpatient Dr. Bre Moore     #NSTEMI    3vCADs (mLAD 90%, pLCx 95%, mRCA 75%)  ( Trop T peaked 0.3). s/p PCI to mLAD and pLCx 10/18.     - Plavix, atorvastatin, and aspirin as above  - May repeat TTE outpatient in 1-2 months to reevaluate EF post PCI   - Follow up with heart failure Dr Magallanes December 6th 9:45am (or earlier if appointment becomes available)     # HFrEF  EF 35% (10/10/2022).   - Entresto 24-26 po daily  - Toprol 12.5 po daily  - Follow up with Dr. Magallanes outpatient to evaluate for spironolactone and SGLT-2 inhibitor therapy based on NYHA HF class    #Bradycardia  RESOLVED. Bradycardia 20s-40s during hospital admit, now resolved, HR stable in 50s-60s off midodrine.     # Hematoma  R groin with large ecchymosis, quarter-sized non-tender hard, non-compressible hematoma with bruit appreciated at site of previous cath access.   RLE arterial duplex 10/10: no hematoma or pseudoaneurysm identified.   Repeat RLE duplex 10/17: No right groin mass or fluid collection. A few benign-appearing right inguinal lymph nodes visualized in the palpable area of concern.    #Nicotine dependence.   15-20 pack years  - Encourage smoking cessation    New medications: Plavix, atorvastatin, aspirin, entresto, toprol     PHYSICAL EXAM:  GENERAL: awake, alert, no acute distress  HEAD:  Atraumatic, Normocephalic  EYES: EOMI, PERRLA, conjunctiva and sclera clear. Xanthomas seen on eyelids.   NECK: Supple, No JVD, Normal thyroid, no enlarged nodes  NERVOUS SYSTEM:  Alert & Awake.   CHEST/LUNG: B/L good air entry, no rales, rhonchi, or wheezing  HEART: S1S2 normal, no S3, Regular rate and rhythm; No murmurs  ABDOMEN: Soft, Nontender, Nondistended; Bowel sounds present. Impella removal site packed with bandage CDI.   EXTREMITIES:  2+ Peripheral Pulses, No clubbing, cyanosis, or edema  LYMPH: No lymphadenopathy noted  SKIN: No rashes or lesions      Neurologic:  -Mental status: Awake, alert, oriented to person, place, and time. Speech is fluent with intact naming, repetition, and comprehension, no dysarthria. Recent and remote memory intact. Follows commands. Attention/concentration intact. Fund of knowledge appropriate.  -Cranial nerves:   II: Visual fields are full to confrontation.  III, IV, VI: Extraocular movements are intact without nystagmus. Pupils equally round and reactive to light  V:  Facial sensation V1-V3 equal and intact   VII: Face is symmetric   Motor: Normal bulk and tone. No pronator drift. Strength bilateral upper extremity 5/5, bilateral lower extremities 5/5.  Sensation: Intact to light touch bilaterally. No neglect or extinction on double simultaneous testing.  Coordination: No dysmetria on finger-to-nose     d/c nihss: 0

## 2022-10-20 ENCOUNTER — APPOINTMENT (OUTPATIENT)
Dept: HEART AND VASCULAR | Facility: CLINIC | Age: 72
End: 2022-10-20

## 2022-10-24 LAB
CORTICOSTEROID BINDING GLOBULIN RESULT: 1.1 MG/DL — LOW
CORTIS F/TOTAL MFR SERPL: 23 % — SIGNIFICANT CHANGE UP
CORTIS SERPL-MCNC: 8.3 UG/DL — SIGNIFICANT CHANGE UP
CORTISOL, FREE RESULT: 1.9 UG/DL — HIGH

## 2022-10-25 DIAGNOSIS — R29.721 NIHSS SCORE 21: ICD-10-CM

## 2022-10-25 DIAGNOSIS — I11.0 HYPERTENSIVE HEART DISEASE WITH HEART FAILURE: ICD-10-CM
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
## 2022-10-25 DIAGNOSIS — I63.312 CEREBRAL INFARCTION DUE TO THROMBOSIS OF LEFT MIDDLE CEREBRAL ARTERY: ICD-10-CM
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
## 2022-10-25 DIAGNOSIS — L76.32 POSTPROCEDURAL HEMATOMA OF SKIN AND SUBCUTANEOUS TISSUE FOLLOWING OTHER PROCEDURE: ICD-10-CM

## 2022-10-25 DIAGNOSIS — F17.210 NICOTINE DEPENDENCE, CIGARETTES, UNCOMPLICATED: ICD-10-CM

## 2022-10-25 DIAGNOSIS — G81.91 HEMIPLEGIA, UNSPECIFIED AFFECTING RIGHT DOMINANT SIDE: ICD-10-CM

## 2022-10-25 DIAGNOSIS — I21.4 NON-ST ELEVATION (NSTEMI) MYOCARDIAL INFARCTION: ICD-10-CM

## 2022-10-25 DIAGNOSIS — I25.110 ATHEROSCLEROTIC HEART DISEASE OF NATIVE CORONARY ARTERY WITH UNSTABLE ANGINA PECTORIS: ICD-10-CM

## 2022-10-25 DIAGNOSIS — R00.1 BRADYCARDIA, UNSPECIFIED: ICD-10-CM

## 2022-10-25 DIAGNOSIS — R47.01 APHASIA: ICD-10-CM

## 2022-10-25 DIAGNOSIS — I25.5 ISCHEMIC CARDIOMYOPATHY: ICD-10-CM

## 2022-10-25 DIAGNOSIS — I25.84 CORONARY ATHEROSCLEROSIS DUE TO CALCIFIED CORONARY LESION: ICD-10-CM

## 2022-10-25 DIAGNOSIS — I50.21 ACUTE SYSTOLIC (CONGESTIVE) HEART FAILURE: ICD-10-CM

## 2022-10-25 DIAGNOSIS — S01.81XA LACERATION WITHOUT FOREIGN BODY OF OTHER PART OF HEAD, INITIAL ENCOUNTER: ICD-10-CM

## 2022-10-25 DIAGNOSIS — E78.5 HYPERLIPIDEMIA, UNSPECIFIED: ICD-10-CM

## 2022-10-25 DIAGNOSIS — E87.20 ACIDOSIS, UNSPECIFIED: ICD-10-CM

## 2022-10-25 DIAGNOSIS — R29.706 NIHSS SCORE 6: ICD-10-CM
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
## 2022-10-25 DIAGNOSIS — R57.0 CARDIOGENIC SHOCK: ICD-10-CM

## 2022-10-28 ENCOUNTER — APPOINTMENT (OUTPATIENT)
Dept: HEART AND VASCULAR | Facility: CLINIC | Age: 72
End: 2022-10-28

## 2022-10-28 ENCOUNTER — NON-APPOINTMENT (OUTPATIENT)
Age: 72
End: 2022-10-28

## 2022-10-28 VITALS
DIASTOLIC BLOOD PRESSURE: 66 MMHG | SYSTOLIC BLOOD PRESSURE: 112 MMHG | HEART RATE: 102 BPM | TEMPERATURE: 97.6 F | HEIGHT: 70 IN | WEIGHT: 158.29 LBS | BODY MASS INDEX: 22.66 KG/M2 | OXYGEN SATURATION: 95 %

## 2022-10-28 DIAGNOSIS — I25.10 ATHEROSCLEROTIC HEART DISEASE OF NATIVE CORONARY ARTERY W/OUT ANGINA PECTORIS: ICD-10-CM

## 2022-10-28 DIAGNOSIS — I63.40 CEREBRAL INFARCTION DUE TO EMBOLISM OF UNSPECIFIED CEREBRAL ARTERY: ICD-10-CM

## 2022-10-28 DIAGNOSIS — I10 ESSENTIAL (PRIMARY) HYPERTENSION: ICD-10-CM

## 2022-10-28 DIAGNOSIS — Z87.898 PERSONAL HISTORY OF OTHER SPECIFIED CONDITIONS: ICD-10-CM

## 2022-10-28 DIAGNOSIS — Z78.9 OTHER SPECIFIED HEALTH STATUS: ICD-10-CM

## 2022-10-28 PROCEDURE — 93000 ELECTROCARDIOGRAM COMPLETE: CPT

## 2022-10-28 PROCEDURE — 99214 OFFICE O/P EST MOD 30 MIN: CPT

## 2022-10-28 RX ORDER — ATORVASTATIN CALCIUM 80 MG/1
80 TABLET, FILM COATED ORAL
Qty: 30 | Refills: 0 | Status: ACTIVE | COMMUNITY
Start: 2022-10-19

## 2022-10-29 PROBLEM — Z78.9 NON-SMOKER: Status: ACTIVE | Noted: 2022-10-29

## 2022-10-29 PROBLEM — I63.40 CEREBROVASCULAR ACCIDENT (CVA) DUE TO EMBOLISM OF CEREBRAL ARTERY: Status: RESOLVED | Noted: 2022-10-29 | Resolved: 2022-10-29

## 2022-10-29 PROBLEM — I25.10 3-VESSEL CAD: Status: RESOLVED | Noted: 2022-10-29 | Resolved: 2022-10-29

## 2022-10-29 PROBLEM — I10 BENIGN ESSENTIAL HYPERTENSION: Status: RESOLVED | Noted: 2022-10-29 | Resolved: 2022-10-29

## 2022-10-29 PROBLEM — Z87.898 HISTORY OF BRADYCARDIA: Status: RESOLVED | Noted: 2022-10-29 | Resolved: 2022-10-29

## 2022-10-29 NOTE — REASON FOR VISIT
[FreeTextEntry1] : 71 y/o M current smoker who presented with NSTEMI to [facility] and was found to have new systolic heart failure (LVEF ~35%) and 3V CAD. He was transferred to [facility] for CABG, but then had right sided weakness and found to have emolic MCA stroke s/p stent retrieval. He did well after the CVA and regained neurologic function. Due to recent stroke, CABG was cancelled and multi-vessel PCI performed (successful Impella assisted LCX and LAD PCI with excellent result). He was discharged on POD#2 on GDMT. He returned to [facility] ER this week for groin oozing and had a groin ultrasound that was negative.\par Other notable hospital events were hypotension requiring brief midodrine (after CVA) and bradycardia (that resolved after midodrine d/c).\par Today presents for f/u. Doing well, NYHA 1 symptoms, no CP/SOB.\par No angina, CCS 1\par \par Testing/Imaging reviewed:\par EKG today- sinus bradycardia, iRBBB, ST changes in anterolateral leads\par \par Cardiac cath 10/18/22:\par RHC - RA 1mmHg, RV 31/1mmHg, PA 24/5mmHg, PCWP 5mmHg\par CO 5.3L/min\par 3V CAD s/p successful Impella assisted PCI of mid LAD and pLCX\par \par \par

## 2022-10-29 NOTE — PHYSICAL EXAM
[Well Developed] : well developed [Well Nourished] : well nourished [No Acute Distress] : no acute distress [Normal Venous Pressure] : normal venous pressure [Normal Conjunctiva] : normal conjunctiva [No Carotid Bruit] : no carotid bruit [Normal S1, S2] : normal S1, S2 [No Murmur] : no murmur [No Rub] : no rub [No Gallop] : no gallop [Good Air Entry] : good air entry [Clear Lung Fields] : clear lung fields [No Respiratory Distress] : no respiratory distress  [Soft] : abdomen soft [Non Tender] : non-tender [No Masses/organomegaly] : no masses/organomegaly [Normal Bowel Sounds] : normal bowel sounds [Normal Gait] : normal gait [No Edema] : no edema [No Cyanosis] : no cyanosis [No Clubbing] : no clubbing [No Varicosities] : no varicosities [No Rash] : no rash [No Skin Lesions] : no skin lesions [Moves all extremities] : moves all extremities [No Focal Deficits] : no focal deficits [Normal Speech] : normal speech [Alert and Oriented] : alert and oriented [Normal memory] : normal memory [de-identified] : stable groin hematoma on Left/Right

## 2022-10-29 NOTE — ASSESSMENT
[FreeTextEntry1] : #Acute ischemic MCA stroke s/p thrombectomy with improvement\par -f/u with neurology\par - Plavix 75 mg PO QD \par - Atorvastatin 80 mg PO QD \par - ASA 81mg QD \par - NOAC for possible cardioembolic stroke\par - Follow up with neurology outpatient Dr. Bre Moore or Marianna Hudson\par \par #NSTEMI/CAD\par  3vCADs (mLAD 90%, pLCx 95%, mRCA 75%)  ( Trop T peaked 0.3). s/p Impella PCI to mLAD \par and pLCx 10/18. \par - Plavix, atorvastatin, and aspirin. On NOAC, can drop aspirin now (double therapy>triple)\par - Will repeat TTE outpatient in 1-2 months to reevaluate EF post PCI \par - Follow up with heart failure Dr Magallanes December 6th 9:45am (or earlier if \par appointment becomes available) \par \par # HFrEF - pt unwilling to take multiple medications, so we are trying to minimize medications to the essentials.\par EF 35% (10/10/2022). \par - Entresto 24-26 po daily \par - Toprol 12.5 po daily \par - Follow up with Dr. Magallanes outpatient to evaluate for spironolactone and \par SGLT-2 inhibitor therapy based on NYHA HF class \par \par #Bradycardia \par RESOLVED. Bradycardia 20s-40s during hospital admit, now resolved, HR stable in \par 50s-60s off midodrine. \par \par # Hematoma after diagnostic cardiac cath\par -R groin with large ecchymosis, quarter-sized non-tender hard, non-compressible \par hematoma with bruit appreciated at site of previous cath access. \par RLE arterial duplex 10/10: no hematoma or pseudoaneurysm identified. \par Repeat RLE duplex 10/17: No right groin mass or fluid collection. A few \par benign-appearing right inguinal lymph nodes visualized in the palpable area of \par concern. \par \par #Nicotine dependence. \par 15-20 pack years \par - Encourage smoking cessation \par \par did not bring in medications- will send me a list to confirm\par

## 2022-11-14 ENCOUNTER — RX RENEWAL (OUTPATIENT)
Age: 72
End: 2022-11-14

## 2022-11-14 RX ORDER — SACUBITRIL AND VALSARTAN 24; 26 MG/1; MG/1
24-26 TABLET, FILM COATED ORAL TWICE DAILY
Qty: 180 | Refills: 3 | Status: ACTIVE | COMMUNITY

## 2022-11-14 RX ORDER — ASPIRIN ENTERIC COATED TABLETS 81 MG 81 MG/1
81 TABLET, DELAYED RELEASE ORAL DAILY
Qty: 90 | Refills: 3 | Status: ACTIVE | COMMUNITY
Start: 2022-11-14 | End: 1900-01-01

## 2022-12-02 ENCOUNTER — APPOINTMENT (OUTPATIENT)
Dept: HEART AND VASCULAR | Facility: CLINIC | Age: 72
End: 2022-12-02

## 2022-12-02 VITALS
HEIGHT: 70 IN | OXYGEN SATURATION: 93 % | WEIGHT: 156.53 LBS | SYSTOLIC BLOOD PRESSURE: 142 MMHG | DIASTOLIC BLOOD PRESSURE: 80 MMHG | BODY MASS INDEX: 22.41 KG/M2 | HEART RATE: 84 BPM | TEMPERATURE: 98.2 F

## 2022-12-02 PROCEDURE — 99214 OFFICE O/P EST MOD 30 MIN: CPT

## 2022-12-02 RX ORDER — SACUBITRIL AND VALSARTAN 24; 26 MG/1; MG/1
24-26 TABLET, FILM COATED ORAL TWICE DAILY
Qty: 180 | Refills: 3 | Status: DISCONTINUED | COMMUNITY
Start: 2022-11-14 | End: 2022-12-02

## 2022-12-02 RX ORDER — CLOPIDOGREL BISULFATE 75 MG/1
75 TABLET, FILM COATED ORAL
Qty: 90 | Refills: 0 | Status: DISCONTINUED | COMMUNITY
Start: 2022-11-14 | End: 2022-12-02

## 2022-12-02 RX ORDER — CLOPIDOGREL BISULFATE 75 MG/1
75 TABLET, FILM COATED ORAL
Qty: 30 | Refills: 0 | Status: DISCONTINUED | COMMUNITY
Start: 2022-10-19 | End: 2022-12-02

## 2022-12-02 NOTE — REASON FOR VISIT
[FreeTextEntry1] : 73 y/o M current smoker who presented with NSTEMI to [facility] and was found to have new systolic heart failure (LVEF ~35%) and 3V CAD. He was transferred to [facility] for CABG, but then had right sided weakness and found to have emolic MCA stroke s/p stent retrieval. He did well after the CVA and regained neurologic function. Due to recent stroke, CABG was cancelled and multi-vessel PCI performed (successful Impella assisted LCX and LAD PCI with excellent result). He was discharged on POD#2 on GDMT.Other notable hospital events were hypotension requiring brief midodrine (after CVA) and bradycardia (that resolved after midodrine d/c).\par Today presents for f/u. Doing well, NYHA 1 symptoms, no CP/SOB.\par No angina, CCS 1\par He was in ER for R. shoulder pain and discharged after normal EKG\par \par Testing/Imaging reviewed:\par TTE 11/2022\par \par      IMPRESSION:\par       The left atrium is normal. Septal LVH The right atrium is normal. The RV size is normal. RV function is normal. The atrial septal is\par      normal. The aortic root is normal.\par      EF is 40% The inferior wall motion is Akinetic'. All other segments are normal.\par      The aortic valve trileaflets are mildly thickened and calcified with no evidence of stenosis.\par       There is mild mitral regurgitation\par       Tricuspid valve shows mild regurgitation. The calculated pulmonary artery systolic pressure is 21mmHg.\par       There is trace pulmonic regurgitation.\par \par EKG 10/28/22- sinus bradycardia, iRBBB, ST changes in anterolateral leads\par \par Cardiac cath 10/18/22:\par RHC - RA 1mmHg, RV 31/1mmHg, PA 24/5mmHg, PCWP 5mmHg\par CO 5.3L/min\par 3V CAD s/p successful Impella assisted PCI of mid LAD and pLCX\par \par \par

## 2022-12-02 NOTE — ASSESSMENT
[FreeTextEntry1] : #Acute ischemic MCA stroke s/p thrombectomy with improvement\par -f/u with neurology\par - Atorvastatin 80 mg PO QD \par - ASA 81mg QD \par - Follow up with Marianna Hudson\par \par #NSTEMI/CAD\par  3vCADs (mLAD 90%, pLCx 95%, mRCA 75%)  ( Trop T peaked 0.3). s/p Impella PCI to mLAD \par and pLCx 10/18. \par - ASA/Brilinta \par - statin\par \par # HFrEF \par EF 40% on 11/2022 echo\par - Entresto 24-26 po daily \par - Toprol 25mg po daily \par - plan for farxiga/aldactone, but pt does not want more meds at this time\par \par \par HTN/HLD- Bp controlled, on statin therapy\par \par #Nicotine dependence. \par 15-20 pack years \par - Encourage smoking cessation \par \par

## 2022-12-06 ENCOUNTER — APPOINTMENT (OUTPATIENT)
Dept: HEART AND VASCULAR | Facility: CLINIC | Age: 72
End: 2022-12-06

## 2022-12-11 ENCOUNTER — RX RENEWAL (OUTPATIENT)
Age: 72
End: 2022-12-11

## 2023-01-06 ENCOUNTER — NON-APPOINTMENT (OUTPATIENT)
Age: 73
End: 2023-01-06

## 2023-01-06 ENCOUNTER — APPOINTMENT (OUTPATIENT)
Dept: HEART AND VASCULAR | Facility: CLINIC | Age: 73
End: 2023-01-06
Payer: MEDICARE

## 2023-01-06 VITALS
SYSTOLIC BLOOD PRESSURE: 120 MMHG | WEIGHT: 155.6 LBS | HEIGHT: 70 IN | OXYGEN SATURATION: 98 % | BODY MASS INDEX: 22.28 KG/M2 | HEART RATE: 61 BPM | DIASTOLIC BLOOD PRESSURE: 78 MMHG | TEMPERATURE: 98.5 F

## 2023-01-06 PROCEDURE — 99214 OFFICE O/P EST MOD 30 MIN: CPT

## 2023-01-06 NOTE — ASSESSMENT
[FreeTextEntry1] : #Acute ischemic MCA stroke s/p thrombectomy with improvement\par -ASA/Brilinta\par -asymptomatic now\par -possible cardioembolic source, but never any LV thrombus\par \par #NSTEMI/CAD\par  3vCADs (mLAD 90%, pLCx 95%, mRCA 75%)  ( Trop T peaked 0.3). s/p Impella PCI to mLAD \par and pLCx 10/18. \par - ASA/Brilinta\par -statin, no chest pain\par \par # HFrEF \par EF 35% (10/10/2022). \par - Entresto 24-26 po daily \par - Toprol 25mg daily\par - Pt wants to reduce medications, he thinks his dizziness related to entresto. Will trial him off entresto to see if symptoms better.\par -check BNP and CMP to assess\par -appears euvolemic on exam

## 2023-01-06 NOTE — REASON FOR VISIT
[FreeTextEntry1] : 71 y/o M current smoker who presented with NSTEMI to [facility] and was found to have new systolic heart failure (LVEF ~35%) and 3V CAD. He was transferred to [facility] for CABG, but then had right sided weakness and found to have emolic MCA stroke s/p stent retrieval. He did well after the CVA and regained neurologic function. Due to recent stroke, CABG was cancelled and multi-vessel PCI performed (successful Impella assisted LCX and LAD PCI with excellent result). He was discharged on POD#2 on GDMT.Other notable hospital events were hypotension requiring brief midodrine (after CVA) and bradycardia (that resolved after midodrine d/c).\par Today presents for f/u. Reports worsening SOB, NYHA II\par ALso dizziness over past week\par \par Testing/Imaging reviewed:\par TTE 11/2022\par \par      IMPRESSION:\par       The left atrium is normal. Septal LVH The right atrium is normal. The RV size is normal. RV function is normal. The atrial septal is\par      normal. The aortic root is normal.\par      EF is 40% The inferior wall motion is Akinetic'. All other segments are normal.\par      The aortic valve trileaflets are mildly thickened and calcified with no evidence of stenosis.\par       There is mild mitral regurgitation\par       Tricuspid valve shows mild regurgitation. The calculated pulmonary artery systolic pressure is 21mmHg.\par       There is trace pulmonic regurgitation.\par \par EKG 10/28/22- sinus bradycardia, iRBBB, ST changes in anterolateral leads\par \par Cardiac cath 10/18/22:\par RHC - RA 1mmHg, RV 31/1mmHg, PA 24/5mmHg, PCWP 5mmHg\par CO 5.3L/min\par 3V CAD s/p successful Impella assisted PCI of mid LAD and pLCX\par \par \par

## 2023-01-06 NOTE — REVIEW OF SYSTEMS
[Dyspnea on exertion] : dyspnea during exertion [Chest Discomfort] : no chest discomfort [Lower Ext Edema] : no extremity edema [Joint Pain] : no joint pain [Dizziness] : dizziness [Numbness (Hypoesthesia)] : no numbness [Convulsions] : no convulsions [Negative] : Heme/Lymph

## 2023-01-09 LAB
ALBUMIN SERPL ELPH-MCNC: 4.5 G/DL
ALP BLD-CCNC: 104 U/L
ALT SERPL-CCNC: 41 U/L
ANION GAP SERPL CALC-SCNC: 11 MMOL/L
AST SERPL-CCNC: 23 U/L
BASOPHILS # BLD AUTO: 0.06 K/UL
BASOPHILS NFR BLD AUTO: 0.8 %
BILIRUB SERPL-MCNC: 1.7 MG/DL
BUN SERPL-MCNC: 13 MG/DL
CALCIUM SERPL-MCNC: 10.1 MG/DL
CHLORIDE SERPL-SCNC: 103 MMOL/L
CO2 SERPL-SCNC: 25 MMOL/L
CREAT SERPL-MCNC: 1.2 MG/DL
EGFR: 65 ML/MIN/1.73M2
EOSINOPHIL # BLD AUTO: 0.38 K/UL
EOSINOPHIL NFR BLD AUTO: 5 %
GLUCOSE SERPL-MCNC: 100 MG/DL
HCT VFR BLD CALC: 48.5 %
HGB BLD-MCNC: 16.2 G/DL
IMM GRANULOCYTES NFR BLD AUTO: 0.1 %
LYMPHOCYTES # BLD AUTO: 2.45 K/UL
LYMPHOCYTES NFR BLD AUTO: 32 %
MAN DIFF?: NORMAL
MCHC RBC-ENTMCNC: 31.5 PG
MCHC RBC-ENTMCNC: 33.4 GM/DL
MCV RBC AUTO: 94.2 FL
MONOCYTES # BLD AUTO: 0.68 K/UL
MONOCYTES NFR BLD AUTO: 8.9 %
NEUTROPHILS # BLD AUTO: 4.07 K/UL
NEUTROPHILS NFR BLD AUTO: 53.2 %
NT-PROBNP SERPL-MCNC: 114 PG/ML
PLATELET # BLD AUTO: 243 K/UL
POTASSIUM SERPL-SCNC: 5.1 MMOL/L
PROT SERPL-MCNC: 7.7 G/DL
RBC # BLD: 5.15 M/UL
RBC # FLD: 13.6 %
SODIUM SERPL-SCNC: 140 MMOL/L
WBC # FLD AUTO: 7.65 K/UL

## 2023-01-31 ENCOUNTER — TRANSCRIPTION ENCOUNTER (OUTPATIENT)
Age: 73
End: 2023-01-31

## 2023-02-09 ENCOUNTER — RX RENEWAL (OUTPATIENT)
Age: 73
End: 2023-02-09

## 2023-05-21 RX ORDER — ATORVASTATIN CALCIUM 80 MG/1
80 TABLET, FILM COATED ORAL
Qty: 90 | Refills: 3 | Status: ACTIVE | COMMUNITY
Start: 2022-11-14 | End: 1900-01-01

## 2023-05-30 ENCOUNTER — NON-APPOINTMENT (OUTPATIENT)
Age: 73
End: 2023-05-30

## 2023-05-30 ENCOUNTER — APPOINTMENT (OUTPATIENT)
Dept: THORACIC SURGERY | Facility: CLINIC | Age: 73
End: 2023-05-30

## 2023-06-30 NOTE — STROKE CODE NOTE - NIH STROKE SCALE: 5B. MOTOR ARM, RIGHT, QM
Prescription has been electronically faxed to your pharmacy.    Filled per medication protocol.   (4) No movement

## 2023-08-11 NOTE — H&P ADULT - HISTORY OF PRESENT ILLNESS
Patient's BEE report reviewed and deemed appropriate.  Patient counseled on use of controlled substances.  
Rx Refill Note  Requested Prescriptions     Pending Prescriptions Disp Refills    Concerta 36 MG CR tablet 30 tablet 0     Sig: Take 1 tablet by mouth Daily      Last office visit with prescribing clinician: 2/23/2023   Last telemedicine visit with prescribing clinician: Visit date not found   Next office visit with prescribing clinician: 8/21/2023                         Would you like a call back once the refill request has been completed: [] Yes [] No    If the office needs to give you a call back, can they leave a voicemail: [] Yes [] No    Eb Ahumada MA  08/11/23, 10:32 EDT  
Patient is a 72-year-old male nicotine dependence who has not followed with physicians regularly presented to the emergency department with complaint of substernal chest pain radiating to bilateral  arms. Patient reports he had initial episode on 10/3 upon awakening. It lasted approximately 15 minutes patient took aspirin at home and resolved. Patient had another episode of chest pain   radiating to bilateral arms this morning lasting approximately 15 minutes again took a dose of aspirin. Reoccurred yesterday afternoon and presented to the emergency department for further   evaluation. In the ER patient was found to have inverted T waves in the lateral leads and markedly elevated troponin. Patient was chest pain-free upon arrival in the ER. Emergency department spoke   with interventional cardiology who recommend loading the patient with Plavix starting on heparin drip for planned cardiac catheterization. Patient denies any associated neck or jaw pain   with his chest pain symptoms. Patient denies any cardiac evaluation in the past. He denies any family history of heart disease. Cardiac cath showed (90% mLAD, 95% pLCX, 75% mRCA).  Denies GI bleeding, stroke, melena, varicosities.    
57

## 2023-08-22 ENCOUNTER — APPOINTMENT (OUTPATIENT)
Dept: CARDIOLOGY | Facility: CLINIC | Age: 73
End: 2023-08-22
Payer: MEDICARE

## 2023-08-22 ENCOUNTER — NON-APPOINTMENT (OUTPATIENT)
Age: 73
End: 2023-08-22

## 2023-08-22 VITALS — DIASTOLIC BLOOD PRESSURE: 70 MMHG | SYSTOLIC BLOOD PRESSURE: 100 MMHG

## 2023-08-22 VITALS
DIASTOLIC BLOOD PRESSURE: 60 MMHG | HEART RATE: 65 BPM | HEIGHT: 70 IN | SYSTOLIC BLOOD PRESSURE: 80 MMHG | WEIGHT: 161 LBS | BODY MASS INDEX: 23.05 KG/M2 | OXYGEN SATURATION: 96 %

## 2023-08-22 PROCEDURE — 99215 OFFICE O/P EST HI 40 MIN: CPT

## 2023-08-22 PROCEDURE — 93000 ELECTROCARDIOGRAM COMPLETE: CPT

## 2023-08-22 RX ORDER — PREDNISONE 20 MG/1
20 TABLET ORAL
Qty: 18 | Refills: 0 | Status: DISCONTINUED | COMMUNITY
Start: 2022-09-19 | End: 2023-08-22

## 2023-08-22 RX ORDER — TICAGRELOR 90 MG/1
90 TABLET ORAL TWICE DAILY
Qty: 180 | Refills: 3 | Status: DISCONTINUED | COMMUNITY
Start: 2022-11-14 | End: 2023-08-22

## 2023-08-23 RX ORDER — METOPROLOL SUCCINATE 25 MG/1
25 TABLET, EXTENDED RELEASE ORAL
Qty: 45 | Refills: 0 | Status: DISCONTINUED | COMMUNITY
Start: 2022-10-19 | End: 2023-08-23

## 2023-09-27 ENCOUNTER — APPOINTMENT (OUTPATIENT)
Dept: PULMONOLOGY | Facility: CLINIC | Age: 73
End: 2023-09-27
Payer: MEDICARE

## 2023-09-27 VITALS
HEART RATE: 70 BPM | HEIGHT: 70 IN | SYSTOLIC BLOOD PRESSURE: 102 MMHG | OXYGEN SATURATION: 98 % | BODY MASS INDEX: 22.9 KG/M2 | WEIGHT: 160 LBS | DIASTOLIC BLOOD PRESSURE: 70 MMHG

## 2023-09-27 DIAGNOSIS — R91.1 SOLITARY PULMONARY NODULE: ICD-10-CM

## 2023-09-27 DIAGNOSIS — R06.09 OTHER FORMS OF DYSPNEA: ICD-10-CM

## 2023-09-27 DIAGNOSIS — Z87.891 PERSONAL HISTORY OF NICOTINE DEPENDENCE: ICD-10-CM

## 2023-09-27 PROCEDURE — 99214 OFFICE O/P EST MOD 30 MIN: CPT

## 2023-10-08 ENCOUNTER — RESULT REVIEW (OUTPATIENT)
Age: 73
End: 2023-10-08

## 2023-12-12 ENCOUNTER — NON-APPOINTMENT (OUTPATIENT)
Age: 73
End: 2023-12-12

## 2023-12-12 ENCOUNTER — APPOINTMENT (OUTPATIENT)
Dept: CARDIOLOGY | Facility: CLINIC | Age: 73
End: 2023-12-12
Payer: MEDICARE

## 2023-12-12 VITALS
OXYGEN SATURATION: 99 % | SYSTOLIC BLOOD PRESSURE: 82 MMHG | DIASTOLIC BLOOD PRESSURE: 55 MMHG | HEART RATE: 49 BPM | WEIGHT: 165 LBS | BODY MASS INDEX: 23.62 KG/M2 | HEIGHT: 70 IN

## 2023-12-12 VITALS — SYSTOLIC BLOOD PRESSURE: 110 MMHG | DIASTOLIC BLOOD PRESSURE: 60 MMHG

## 2023-12-12 DIAGNOSIS — I25.10 ATHEROSCLEROTIC HEART DISEASE OF NATIVE CORONARY ARTERY W/OUT ANGINA PECTORIS: ICD-10-CM

## 2023-12-12 DIAGNOSIS — I50.22 CHRONIC SYSTOLIC (CONGESTIVE) HEART FAILURE: ICD-10-CM

## 2023-12-12 DIAGNOSIS — Z86.73 PERSONAL HISTORY OF TRANSIENT ISCHEMIC ATTACK (TIA), AND CEREBRAL INFARCTION W/OUT RESIDUAL DEFICITS: ICD-10-CM

## 2023-12-12 PROCEDURE — 93000 ELECTROCARDIOGRAM COMPLETE: CPT

## 2023-12-12 PROCEDURE — 99214 OFFICE O/P EST MOD 30 MIN: CPT

## 2023-12-12 RX ORDER — TICAGRELOR 90 MG/1
90 TABLET ORAL TWICE DAILY
Qty: 180 | Refills: 3 | Status: DISCONTINUED | COMMUNITY
Start: 2022-10-29 | End: 2023-12-12

## 2024-01-25 ENCOUNTER — APPOINTMENT (OUTPATIENT)
Dept: CARDIOLOGY | Facility: CLINIC | Age: 74
End: 2024-01-25
Payer: MEDICARE

## 2024-01-25 PROCEDURE — 93306 TTE W/DOPPLER COMPLETE: CPT

## 2024-01-25 PROCEDURE — 93880 EXTRACRANIAL BILAT STUDY: CPT

## 2024-04-06 NOTE — PHYSICAL THERAPY INITIAL EVALUATION ADULT - ADDITIONAL COMMENTS
No
Pt lives with son in a PH, few steps enter. 1 flight inside the house. Prior to admission, pt ambulated independently without AD. Independent with ADLs.

## 2024-06-09 ENCOUNTER — RESULT REVIEW (OUTPATIENT)
Age: 74
End: 2024-06-09

## 2024-07-08 ENCOUNTER — NON-APPOINTMENT (OUTPATIENT)
Age: 74
End: 2024-07-08

## 2024-07-09 ENCOUNTER — APPOINTMENT (OUTPATIENT)
Dept: CARDIOLOGY | Facility: CLINIC | Age: 74
End: 2024-07-09
Payer: MEDICARE

## 2024-07-09 ENCOUNTER — NON-APPOINTMENT (OUTPATIENT)
Age: 74
End: 2024-07-09

## 2024-07-09 VITALS
HEIGHT: 70 IN | WEIGHT: 169 LBS | DIASTOLIC BLOOD PRESSURE: 70 MMHG | HEART RATE: 49 BPM | OXYGEN SATURATION: 97 % | BODY MASS INDEX: 24.2 KG/M2 | SYSTOLIC BLOOD PRESSURE: 118 MMHG

## 2024-07-09 DIAGNOSIS — R07.89 OTHER CHEST PAIN: ICD-10-CM

## 2024-07-09 DIAGNOSIS — I50.22 CHRONIC SYSTOLIC (CONGESTIVE) HEART FAILURE: ICD-10-CM

## 2024-07-09 DIAGNOSIS — I25.10 ATHEROSCLEROTIC HEART DISEASE OF NATIVE CORONARY ARTERY W/OUT ANGINA PECTORIS: ICD-10-CM

## 2024-07-09 PROCEDURE — G2211 COMPLEX E/M VISIT ADD ON: CPT

## 2024-07-09 PROCEDURE — 99215 OFFICE O/P EST HI 40 MIN: CPT

## 2024-07-09 PROCEDURE — 93000 ELECTROCARDIOGRAM COMPLETE: CPT

## 2024-07-10 LAB
ANION GAP SERPL CALC-SCNC: 15 MMOL/L
BUN SERPL-MCNC: 15 MG/DL
CALCIUM SERPL-MCNC: 9.4 MG/DL
CHLORIDE SERPL-SCNC: 103 MMOL/L
CO2 SERPL-SCNC: 20 MMOL/L
CREAT SERPL-MCNC: 1.19 MG/DL
EGFR: 64 ML/MIN/1.73M2
POTASSIUM SERPL-SCNC: 5.2 MMOL/L
SODIUM SERPL-SCNC: 139 MMOL/L

## 2024-08-05 ENCOUNTER — APPOINTMENT (OUTPATIENT)
Dept: CARDIOLOGY | Facility: CLINIC | Age: 74
End: 2024-08-05

## 2024-08-05 PROCEDURE — 36415 COLL VENOUS BLD VENIPUNCTURE: CPT

## 2024-08-12 ENCOUNTER — APPOINTMENT (OUTPATIENT)
Dept: CARDIOLOGY | Facility: CLINIC | Age: 74
End: 2024-08-12
Payer: MEDICARE

## 2024-08-12 DIAGNOSIS — E78.5 HYPERLIPIDEMIA, UNSPECIFIED: ICD-10-CM

## 2024-08-12 PROCEDURE — 96372 THER/PROPH/DIAG INJ SC/IM: CPT

## 2024-08-12 NOTE — REASON FOR VISIT
[FreeTextEntry1] : Patient presented to the office as a walk in for Repatha administration/demo.   He brought his Repatha 140mg/ml pen injector from home.

## 2024-08-12 NOTE — HISTORY OF PRESENT ILLNESS
[FreeTextEntry1] : 74 year old male with history of NSTEMI, 3v CAD, LV systolic heart failure LVEF 35%. Transferred to [facility] for CABG but then had right sided weakness and embolic MCA stroke treated with thrombectomy. He did well after the CVA and regained neurologic function. Due to recent stroke, CABG was cancelled and multivessel PCI was performed (successful Impella-assisted LCx and LAD PCI 10/18/22).

## 2024-08-29 ENCOUNTER — APPOINTMENT (OUTPATIENT)
Dept: CARDIOLOGY | Facility: CLINIC | Age: 74
End: 2024-08-29
Payer: MEDICARE

## 2024-08-29 ENCOUNTER — LABORATORY RESULT (OUTPATIENT)
Age: 74
End: 2024-08-29

## 2024-08-29 PROCEDURE — 36415 COLL VENOUS BLD VENIPUNCTURE: CPT

## 2024-08-30 LAB
ALBUMIN SERPL ELPH-MCNC: 4.4 G/DL
ALP BLD-CCNC: 79 U/L
ALT SERPL-CCNC: 17 U/L
AST SERPL-CCNC: 18 U/L
BILIRUB DIRECT SERPL-MCNC: 0.3 MG/DL
BILIRUB INDIRECT SERPL-MCNC: 1.2 MG/DL
BILIRUB SERPL-MCNC: 1.4 MG/DL
CHOLEST SERPL-MCNC: 145 MG/DL
HDLC SERPL-MCNC: 48 MG/DL
LDLC SERPL CALC-MCNC: 69 MG/DL
NONHDLC SERPL-MCNC: 97 MG/DL
PROT SERPL-MCNC: 7.6 G/DL
TRIGL SERPL-MCNC: 168 MG/DL

## 2024-09-10 ENCOUNTER — APPOINTMENT (OUTPATIENT)
Dept: CARDIOLOGY | Facility: CLINIC | Age: 74
End: 2024-09-10
Payer: MEDICARE

## 2024-09-10 PROCEDURE — ZZZZZ: CPT | Mod: NC

## 2024-09-10 PROCEDURE — 93351 STRESS TTE COMPLETE: CPT

## 2025-03-31 ENCOUNTER — NON-APPOINTMENT (OUTPATIENT)
Age: 75
End: 2025-03-31

## 2025-04-08 ENCOUNTER — APPOINTMENT (OUTPATIENT)
Dept: CARDIOLOGY | Facility: CLINIC | Age: 75
End: 2025-04-08
Payer: MEDICARE

## 2025-04-08 ENCOUNTER — NON-APPOINTMENT (OUTPATIENT)
Age: 75
End: 2025-04-08

## 2025-04-08 VITALS
DIASTOLIC BLOOD PRESSURE: 74 MMHG | OXYGEN SATURATION: 98 % | HEIGHT: 70 IN | WEIGHT: 170 LBS | HEART RATE: 75 BPM | SYSTOLIC BLOOD PRESSURE: 124 MMHG | BODY MASS INDEX: 24.34 KG/M2

## 2025-04-08 DIAGNOSIS — E78.5 HYPERLIPIDEMIA, UNSPECIFIED: ICD-10-CM

## 2025-04-08 DIAGNOSIS — I25.10 ATHEROSCLEROTIC HEART DISEASE OF NATIVE CORONARY ARTERY W/OUT ANGINA PECTORIS: ICD-10-CM

## 2025-04-08 PROCEDURE — 93000 ELECTROCARDIOGRAM COMPLETE: CPT

## 2025-04-08 PROCEDURE — 99215 OFFICE O/P EST HI 40 MIN: CPT

## 2025-05-20 ENCOUNTER — APPOINTMENT (OUTPATIENT)
Dept: GASTROENTEROLOGY | Facility: CLINIC | Age: 75
End: 2025-05-20
Payer: MEDICARE

## 2025-05-20 VITALS
WEIGHT: 171 LBS | SYSTOLIC BLOOD PRESSURE: 130 MMHG | HEIGHT: 70 IN | BODY MASS INDEX: 24.48 KG/M2 | DIASTOLIC BLOOD PRESSURE: 84 MMHG | HEART RATE: 54 BPM | OXYGEN SATURATION: 99 %

## 2025-05-20 DIAGNOSIS — R19.4 CHANGE IN BOWEL HABIT: ICD-10-CM

## 2025-05-20 DIAGNOSIS — K21.9 GASTRO-ESOPHAGEAL REFLUX DISEASE W/OUT ESOPHAGITIS: ICD-10-CM

## 2025-05-20 DIAGNOSIS — Z86.0101 PERSONAL HISTORY OF ADENOMATOUS AND SERRATED COLON POLYPS: ICD-10-CM

## 2025-05-20 PROCEDURE — 99204 OFFICE O/P NEW MOD 45 MIN: CPT

## 2025-05-20 RX ORDER — FAMOTIDINE 40 MG/1
40 TABLET, FILM COATED ORAL
Qty: 60 | Refills: 3 | Status: ACTIVE | COMMUNITY
Start: 2025-05-20 | End: 1900-01-01

## 2025-06-03 ENCOUNTER — APPOINTMENT (OUTPATIENT)
Dept: UROLOGY | Facility: CLINIC | Age: 75
End: 2025-06-03

## 2025-06-17 ENCOUNTER — APPOINTMENT (OUTPATIENT)
Dept: UROLOGY | Facility: CLINIC | Age: 75
End: 2025-06-17
Payer: MEDICARE

## 2025-06-17 VITALS — BODY MASS INDEX: 23.94 KG/M2 | WEIGHT: 171 LBS | HEIGHT: 71 IN

## 2025-06-17 PROBLEM — R31.29 OTHER MICROSCOPIC HEMATURIA: Status: ACTIVE | Noted: 2025-06-17

## 2025-06-17 PROBLEM — Z12.5 PROSTATE CANCER SCREENING: Status: ACTIVE | Noted: 2025-06-17

## 2025-06-17 PROCEDURE — 51798 US URINE CAPACITY MEASURE: CPT

## 2025-06-17 PROCEDURE — 99204 OFFICE O/P NEW MOD 45 MIN: CPT

## 2025-06-20 LAB — URINE CYTOLOGY: NORMAL

## 2025-06-27 NOTE — PATIENT PROFILE ADULT - FUNCTIONAL ASSESSMENT - BASIC MOBILITY 5.
Refill Routing Note   Medication(s) are not appropriate for processing by [facility] for the following reason(s):        Allergy or intolerance    [facility] action(s):  Defer        Medication Therapy Plan: Allergy/Contraindication: Levothyroxine; FLOS    Pharmacist review requested: Yes     Appointments  past 12m or future 3m with PCP    Date Provider   Last Visit   7/3/2024 Corey He MD   Next Visit   Visit date not found Corey He MD   ED visits in past 90 days: 0        Note composed:1:09 PM 06/27/2025           4 = No assist / stand by assistance

## 2025-08-01 ENCOUNTER — APPOINTMENT (OUTPATIENT)
Dept: UROLOGY | Facility: CLINIC | Age: 75
End: 2025-08-01

## 2025-08-19 ENCOUNTER — RX RENEWAL (OUTPATIENT)
Age: 75
End: 2025-08-19